# Patient Record
Sex: FEMALE | Race: WHITE | NOT HISPANIC OR LATINO | Employment: OTHER | URBAN - METROPOLITAN AREA
[De-identification: names, ages, dates, MRNs, and addresses within clinical notes are randomized per-mention and may not be internally consistent; named-entity substitution may affect disease eponyms.]

---

## 2017-03-07 ENCOUNTER — OFFICE VISIT (OUTPATIENT)
Dept: INTERNAL MEDICINE | Facility: CLINIC | Age: 67
End: 2017-03-07
Payer: MEDICARE

## 2017-03-07 VITALS
HEIGHT: 72 IN | BODY MASS INDEX: 38.47 KG/M2 | DIASTOLIC BLOOD PRESSURE: 78 MMHG | WEIGHT: 284 LBS | HEART RATE: 67 BPM | SYSTOLIC BLOOD PRESSURE: 118 MMHG

## 2017-03-07 DIAGNOSIS — Z12.11 SCREENING FOR COLON CANCER: ICD-10-CM

## 2017-03-07 DIAGNOSIS — M19.90 OSTEOARTHRITIS, UNSPECIFIED OSTEOARTHRITIS TYPE, UNSPECIFIED SITE: ICD-10-CM

## 2017-03-07 DIAGNOSIS — Z79.1 ENCOUNTER FOR LONG-TERM (CURRENT) USE OF NSAIDS: ICD-10-CM

## 2017-03-07 DIAGNOSIS — M19.90 INFLAMMATORY ARTHRITIS: ICD-10-CM

## 2017-03-07 DIAGNOSIS — L92.1 NECROBIOSIS LIPOIDICA: ICD-10-CM

## 2017-03-07 DIAGNOSIS — I10 ESSENTIAL HYPERTENSION: ICD-10-CM

## 2017-03-07 DIAGNOSIS — Z12.31 ENCOUNTER FOR SCREENING MAMMOGRAM FOR MALIGNANT NEOPLASM OF BREAST: ICD-10-CM

## 2017-03-07 DIAGNOSIS — M13.0 POLYARTHRITIS: Primary | ICD-10-CM

## 2017-03-07 DIAGNOSIS — Z12.39 ENCOUNTER FOR SCREENING FOR MALIGNANT NEOPLASM OF BREAST: ICD-10-CM

## 2017-03-07 DIAGNOSIS — Z11.59 ENCOUNTER FOR HEPATITIS C SCREENING TEST FOR LOW RISK PATIENT: ICD-10-CM

## 2017-03-07 PROCEDURE — 99204 OFFICE O/P NEW MOD 45 MIN: CPT | Mod: S$GLB,,, | Performed by: INTERNAL MEDICINE

## 2017-03-07 PROCEDURE — 99999 PR PBB SHADOW E&M-EST. PATIENT-LVL III: CPT | Mod: PBBFAC,,, | Performed by: INTERNAL MEDICINE

## 2017-03-07 PROCEDURE — 99499 UNLISTED E&M SERVICE: CPT | Mod: S$GLB,,, | Performed by: INTERNAL MEDICINE

## 2017-03-07 PROCEDURE — 3074F SYST BP LT 130 MM HG: CPT | Mod: S$GLB,,, | Performed by: INTERNAL MEDICINE

## 2017-03-07 PROCEDURE — 3078F DIAST BP <80 MM HG: CPT | Mod: S$GLB,,, | Performed by: INTERNAL MEDICINE

## 2017-03-07 PROCEDURE — 1160F RVW MEDS BY RX/DR IN RCRD: CPT | Mod: S$GLB,,, | Performed by: INTERNAL MEDICINE

## 2017-03-07 RX ORDER — LISINOPRIL 20 MG/1
20 TABLET ORAL DAILY
Qty: 90 TABLET | Refills: 11 | Status: SHIPPED | OUTPATIENT
Start: 2017-03-07 | End: 2017-03-28 | Stop reason: SDUPTHER

## 2017-03-07 RX ORDER — FUROSEMIDE 20 MG/1
20 TABLET ORAL DAILY
Qty: 90 TABLET | Refills: 11 | Status: SHIPPED | OUTPATIENT
Start: 2017-03-07 | End: 2017-03-28 | Stop reason: SDUPTHER

## 2017-03-07 RX ORDER — LISINOPRIL 20 MG/1
20 TABLET ORAL DAILY
COMMUNITY
End: 2017-03-07 | Stop reason: SDUPTHER

## 2017-03-07 RX ORDER — FUROSEMIDE 20 MG/1
20 TABLET ORAL 2 TIMES DAILY
COMMUNITY
End: 2017-03-07 | Stop reason: SDUPTHER

## 2017-03-07 RX ORDER — AMLODIPINE BESYLATE 5 MG/1
5 TABLET ORAL DAILY
Qty: 90 TABLET | Refills: 11 | Status: SHIPPED | OUTPATIENT
Start: 2017-03-07 | End: 2017-03-28 | Stop reason: SDUPTHER

## 2017-03-07 RX ORDER — MELOXICAM 7.5 MG/1
7.5 TABLET ORAL DAILY
Qty: 90 TABLET | Refills: 1 | Status: SHIPPED | OUTPATIENT
Start: 2017-03-07 | End: 2017-03-28 | Stop reason: SDUPTHER

## 2017-03-07 NOTE — MR AVS SNAPSHOT
[facility]  [address]  [facility] [address]  Phone: [phone]  Fax: [phone]                  Ana Patel   3/7/2017 9:20 AM   Office Visit    Description:  Female : 1950   Provider:  Gordo Naik MD   Department:  [facility] - Internal Medicine           Reason for Visit     Establish Care           Diagnoses this Visit        Comments    Osteoarthritis, unspecified osteoarthritis type, unspecified site    -  Primary     Essential hypertension         Necrobiosis lipoidica         Polyarthritis         Encounter for hepatitis C screening test for low risk patient         Encounter for screening for malignant neoplasm of breast         Screening for colon cancer         Encounter for screening mammogram for malignant neoplasm of breast         Inflammatory arthritis         Encounter for long-term (current) use of NSAIDs                To Do List           Goals (5 Years of Data)     Quit smoking / using tobacco       Follow-Up and Disposition     Return in about 6 months (around 2017).       These Medications        Disp Refills Start End    amlodipine (NORVASC) 5 MG tablet 90 tablet 11 3/7/2017     Take 1 tablet (5 mg total) by mouth once daily. - Oral    Pharmacy: [facility] [facility] [address] Ph #: [phone]       furosemide (LASIX) 20 MG tablet 90 tablet 11 3/7/2017     Take 1 tablet (20 mg total) by mouth once daily. - Oral    Ph[address]acy: [facility]  [place] [address] Ph #: [phone]       lisinopril (PRINIVIL,ZESTRIL) 20 MG tablet 90 tablet 11 3/7/2017     Take 1 tablet (20 mg total) by mouth once daily. - Oral    Pharmacy: [facility] [facility] [place] [address] Ph #: [phone]       meloxicam (MOBIC) 7.5 MG tablet 90 tablet 1 3/7/2017     Take 1 tablet (7.5 mg total) by mouth once daily. - Oral    Pharmacy: [facility] [address] [place] - [address] [place] [address] #: [phone]         [facility]     [facility] - 24/7 Assistance  Registered nurses in the [facility] provide clinical advisement, health education, appointment booking, and other advisory services.  Call for this free service at [phone].             Medications           Message regarding Medications     Verify the changes and/or additions to your medication regime listed below are the same as discussed with your clinician today.  If any of these changes or additions are incorrect, please notify your healthcare provider.        START taking these NEW medications        Refills    furosemide (LASIX) 20 MG tablet 11    Sig: Take 1 tablet (20 mg total) by mouth once daily.    Class: Normal    Route: Oral    lisinopril (PRINIVIL,ZESTRIL) 20 MG tablet 11    Sig: Take 1 tablet (20 mg total) by mouth once daily.    Class: Normal    Route: Oral      CHANGE how you are taking these medications     Start Taking Instead of    amlodipine (NORVASC) 5 MG tablet amlodipine (NORVASC) 5 MG tablet    Dosage:  Take 1 tablet (5 mg total) by mouth once daily. Dosage:  Take 5 mg by mouth once daily.    Reason for Change:  Reorder     meloxicam (MOBIC) 7.5 MG tablet meloxicam (MOBIC) 7.5 MG tablet    Dosage:  Take 1 tablet (7.5 mg total) by mouth once daily. Dosage:  Take 7.5 mg by mouth once daily.    Reason for Change:  Reorder       STOP taking these medications     methylPREDNISolone (MEDROL DOSEPACK) 4 mg tablet use as directed    lisinopril-hydrochlorothiazide (PRINZIDE,ZESTORETIC) 20-25 mg Tab Take 1 tablet by mouth once daily.    predniSONE (DELTASONE) 20 MG tablet Take one daily    gentamicin (GARAMYCIN) 0.1 % cream Apply topically 3 (three) times daily.    fexofenadine (ALLEGRA) 180 MG tablet Take 180 mg by mouth once daily.    doxycycline (VIBRAMYCIN) 100 MG Cap Take 1 capsule (100 mg total) by mouth every 12 (twelve)  hours.    diphenhydrAMINE (SOMINEX) 25 mg tablet Take 50 mg by mouth nightly as needed.    betamethasone dipropionate (DIPROLENE) 0.05 % cream Apply topically 2 (two) times daily.           Verify that the below list of medications is an accurate representation of the medications you are currently taking.  If none reported, the list may be blank. If incorrect, please contact your healthcare provider. Carry this list with you in case of emergency.           Current Medications     amlodipine (NORVASC) 5 MG tablet Take 1 tablet (5 mg total) by mouth once daily.    furosemide (LASIX) 20 MG tablet Take 1 tablet (20 mg total) by mouth once daily.    lisinopril (PRINIVIL,ZESTRIL) 20 MG tablet Take 1 tablet (20 mg total) by mouth once daily.    meloxicam (MOBIC) 7.5 MG tablet Take 1 tablet (7.5 mg total) by mouth once daily.           Clinical Reference Information           Your Vitals Were     BP Pulse Height Weight BMI    118/78 67 6' (1.829 m) 128.8 kg (284 lb) 38.52 kg/m2      Blood Pressure          Most Recent Value    BP  118/78      Allergies as of 3/7/2017     Sulfa (Sulfonamide Antibiotics)    Ciprofloxacin    Pcn [Penicillins]      Immunizations Administered on Date of Encounter - 3/7/2017     None      Orders Placed During Today's Visit      Normal Orders This Visit    Ambulatory Referral to Rheumatology     Future Labs/Procedures Expected by Expires    C-reactive protein  3/7/2017 5/6/2018    CBC auto differential  3/7/2017 6/5/2017    Comprehensive metabolic panel  3/7/2017 6/5/2017    Cyclic citrul peptide antibody, IgG  3/7/2017 5/6/2018    Hepatitis C antibody  3/7/2017 5/6/2018    Mammo Digital Screening Bilat with CAD  3/7/2017 (Approximate) 3/7/2018    Rheumatoid factor  3/7/2017 6/5/2017    Sedimentation rate, manual  3/7/2017 6/5/2017    TSH  3/7/2017 5/6/2018    Lipid panel  9/3/2017 (Approximate) 11/2/2017      Smoking Cessation     If you would like to quit smoking:   You may be eligible for free  services if you are a [place] resident and started smoking cigarettes before September 1, 1988.  Call the Smoking Cessation Trust (SCT) toll free at (035) 963-6243 or (521) 967-6435.   Call 1-800-QUIT-NOW if you do not meet the above criteria.            Language Assistance Services     ATTENTION: Language assistance services are available, free of charge. Please call [phone].      ATENCIÓN: Si habla español, tiene a lance disposición servicios gratuitos de asistencia lingüística. Llame al [phone].     CHÚ Ý: N?u b?n nói Ti?ng Vi?t, có các d?ch v? h? tr? ngôn ng? mi?n phí dành cho b?n. G?i s? [phone].         [facility] complies with applicable Federal civil rights laws and does not discriminate on the basis of race, color, national origin, age, disability, or sex.

## 2017-03-07 NOTE — PROGRESS NOTES
Subjective:       Patient ID: Ana Patel is a 67 y.o. female.    Chief Complaint: Establish Care    HPI Comments: Here to est care.    Chronic arthirtis pains, mobic 7.5mg losing efficacy. occ aleve as well.  She claims hx of arthritis at 5yrs, may have had rheumatic fever.  Stiffness first in am, 1-2 hours. Multiple jts are involved, especially hands, feet.    Review of Systems   Constitutional: Negative for activity change, appetite change and unexpected weight change.   Eyes: Negative for visual disturbance.   Respiratory: Negative for cough, chest tightness and shortness of breath.    Cardiovascular: Negative for chest pain.   Gastrointestinal: Negative for abdominal distention and abdominal pain.   Genitourinary: Negative for difficulty urinating and urgency.        No breast lumps/masses/pain/nipple d/c in either breast     Musculoskeletal: Positive for arthralgias and joint swelling.   Skin: Negative for rash.   Neurological: Negative for tremors, syncope and weakness.       Objective:      Physical Exam   Constitutional: She is oriented to person, place, and time. She appears well-developed and well-nourished. No distress.   HENT:   Head: Normocephalic and atraumatic.   Eyes: Pupils are equal, round, and reactive to light. No scleral icterus.   Neck: Normal range of motion. No thyromegaly present.   Cardiovascular: Normal rate, regular rhythm and normal heart sounds.  Exam reveals no gallop and no friction rub.    No murmur heard.  Pulmonary/Chest: Effort normal and breath sounds normal. No respiratory distress. She has no wheezes. She has no rales.   Abdominal: Soft. Bowel sounds are normal. She exhibits no distension and no mass. There is no tenderness. There is no rebound and no guarding.   Musculoskeletal: Normal range of motion. She exhibits edema.   Flat feet bilat with lateral deviation of the toes.    No nail pitting.    Mild leg swelling bilat   Lymphadenopathy:     She has no cervical  adenopathy.   Neurological: She is alert and oriented to person, place, and time.   Skin: She is not diaphoretic.   Psychiatric: She has a normal mood and affect. Her speech is normal and behavior is normal. Cognition and memory are normal.       Assessment:       1. Osteoarthritis, unspecified osteoarthritis type, unspecified site    2. Essential hypertension    3. Necrobiosis lipoidica    4. Polyarthritis    5. Encounter for hepatitis C screening test for low risk patient    6. Encounter for screening for malignant neoplasm of breast    7. Screening for colon cancer    8. Encounter for screening mammogram for malignant neoplasm of breast    9. Inflammatory arthritis    10. Encounter for long-term (current) use of NSAIDs        Plan:       Ana was seen today for establish care.    Diagnoses and all orders for this visit:    Osteoarthritis, unspecified osteoarthritis type, unspecified site  -     meloxicam (MOBIC) 7.5 MG tablet; Take 1 tablet (7.5 mg total) by mouth once daily.  -     CBC auto differential; Future  -     TSH; Future    Essential hypertension  -     amlodipine (NORVASC) 5 MG tablet; Take 1 tablet (5 mg total) by mouth once daily.  -     furosemide (LASIX) 20 MG tablet; Take 1 tablet (20 mg total) by mouth once daily.  -     lisinopril (PRINIVIL,ZESTRIL) 20 MG tablet; Take 1 tablet (20 mg total) by mouth once daily.  -     CBC auto differential; Future  -     Lipid panel; Future  -     TSH; Future  controlled    Necrobiosis lipoidica  -     CBC auto differential; Future  -     TSH; Future    Polyarthritis  -     meloxicam (MOBIC) 7.5 MG tablet; Take 1 tablet (7.5 mg total) by mouth once daily.  -     CBC auto differential; Future  -     Comprehensive metabolic panel; Future  -     TSH; Future  -     Rheumatoid factor; Future  -     Cyclic citrul peptide antibody, IgG; Future  -     Sedimentation rate, manual; Future  -     C-reactive protein; Future  -     Ambulatory Referral to Rheumatology  She  stated she has a hx of psoriasis, not active now though. jtys pains could be PsArthritis.    Encounter for hepatitis C screening test for low risk patient  -     Hepatitis C antibody; Future  -     TSH; Future    Encounter for screening for malignant neoplasm of breast  -     TSH; Future  -     Cancel: Mammo Digital Screening Bilat with CAD; Future    Screening for colon cancer  -     TSH; Future    Encounter for screening mammogram for malignant neoplasm of breast  -     TSH; Future  -     Cancel: Mammo Digital Screening Bilat with CAD; Future    Inflammatory arthritis  -     CBC auto differential; Future  -     TSH; Future    Encounter for long-term (current) use of NSAIDs  -     CBC auto differential; Future  -     TSH; Future      [address]65: Smoking and tobacco cessation counseling visit; intensive, greater than 10 minutes  Not ready to quit    Health Maintenance       Date Due Completion Date    TETANUS VACCINE 1/31/1970 ---    DEXA SCAN 1/31/1992 ---    Colonoscopy 1/31/2002 ---    Pneumococcal (65+) (1 of 2 - PCV13) 1/31/2017 ---    Mammogram 3/8/2019 3/8/2017    Lipid Panel 3/8/2022 3/8/2017      Declines colo  Declines vaccines    Return in about 6 months (around 9/7/2017).

## 2017-03-08 ENCOUNTER — HOSPITAL ENCOUNTER (OUTPATIENT)
Dept: RADIOLOGY | Facility: HOSPITAL | Age: 67
Discharge: HOME | End: 2017-03-08
Attending: INTERNAL MEDICINE
Payer: MEDICARE

## 2017-03-08 DIAGNOSIS — Z12.39 ENCOUNTER FOR SCREENING FOR MALIGNANT NEOPLASM OF BREAST: ICD-10-CM

## 2017-03-08 DIAGNOSIS — Z12.31 ENCOUNTER FOR SCREENING MAMMOGRAM FOR MALIGNANT NEOPLASM OF BREAST: ICD-10-CM

## 2017-03-08 PROCEDURE — 77063 BREAST TOMOSYNTHESIS BI: CPT | Mod: 26,,, | Performed by: RADIOLOGY

## 2017-03-08 PROCEDURE — 77067 SCR MAMMO BI INCL CAD: CPT | Mod: 26,,, | Performed by: RADIOLOGY

## 2017-03-08 PROCEDURE — 77067 SCR MAMMO BI INCL CAD: CPT | Mod: TC

## 2017-03-13 NOTE — PROGRESS NOTES
Patient, Ana Patel (MRN #[id number]), presented with a recorded BMI of 38.52 kg/m^2 and a documented comorbidity(s):  - Hypertension  to which the severe obesity is a contributing factor. This is consistent with the definition of severe obesity (BMI 35.0-35.9) with comorbidity (ICD-10 E66.01, Z68.35). The patient's severe obesity was monitored, evaluated, addressed and/or treated. This addendum to the medical record is made on 03/13/2017.

## 2017-03-28 DIAGNOSIS — I10 ESSENTIAL HYPERTENSION: ICD-10-CM

## 2017-03-28 DIAGNOSIS — M13.0 POLYARTHRITIS: ICD-10-CM

## 2017-03-28 DIAGNOSIS — M19.90 OSTEOARTHRITIS, UNSPECIFIED OSTEOARTHRITIS TYPE, UNSPECIFIED SITE: ICD-10-CM

## 2017-03-28 NOTE — TELEPHONE ENCOUNTER
----- Message from Anabella Carmichael sent at 3/28/2017 12:42 PM CDT -----  Contact: /[place]/[phone]  Pt's  said that he is calling in regards to [employer] needs the doctor to send pt's rx's and needs the dosage and quantity of pt's rx's for meloxicam (MOBIC) 7.5 MG tablet, lisinopril (PRINIVIL,ZESTRIL) 20 MG tablet, furosemide (LASIX) 20 MG tablet and amlodipine (NORVASC) 5 MG tablet pt is asking for a 90 day supply of each medication she is new to [employer] [phone] pt's  would like a call when the rx's have been sent. Please call and advise          Thank you

## 2017-03-29 RX ORDER — LISINOPRIL 20 MG/1
20 TABLET ORAL DAILY
Qty: 90 TABLET | Refills: 11 | Status: SHIPPED | OUTPATIENT
Start: 2017-03-29 | End: 2018-04-25 | Stop reason: SDUPTHER

## 2017-03-29 RX ORDER — FUROSEMIDE 20 MG/1
20 TABLET ORAL DAILY
Qty: 90 TABLET | Refills: 11 | Status: SHIPPED | OUTPATIENT
Start: 2017-03-29 | End: 2018-04-25 | Stop reason: SDUPTHER

## 2017-03-29 RX ORDER — MELOXICAM 7.5 MG/1
7.5 TABLET ORAL DAILY
Qty: 90 TABLET | Refills: 1 | Status: SHIPPED | OUTPATIENT
Start: 2017-03-29 | End: 2017-09-20 | Stop reason: SDUPTHER

## 2017-03-29 RX ORDER — AMLODIPINE BESYLATE 5 MG/1
5 TABLET ORAL DAILY
Qty: 90 TABLET | Refills: 11 | Status: SHIPPED | OUTPATIENT
Start: 2017-03-29 | End: 2018-04-25 | Stop reason: SDUPTHER

## 2017-08-24 ENCOUNTER — OFFICE VISIT (OUTPATIENT)
Dept: INTERNAL MEDICINE | Facility: CLINIC | Age: 67
End: 2017-08-24
Payer: MEDICARE

## 2017-08-24 VITALS
DIASTOLIC BLOOD PRESSURE: 72 MMHG | BODY MASS INDEX: 39.68 KG/M2 | HEART RATE: 68 BPM | SYSTOLIC BLOOD PRESSURE: 118 MMHG | WEIGHT: 293 LBS | HEIGHT: 72 IN

## 2017-08-24 DIAGNOSIS — M13.0 POLYARTHRITIS: ICD-10-CM

## 2017-08-24 DIAGNOSIS — L92.1 NECROBIOSIS LIPOIDICA: ICD-10-CM

## 2017-08-24 DIAGNOSIS — Z00.00 ENCOUNTER FOR PREVENTIVE HEALTH EXAMINATION: Primary | ICD-10-CM

## 2017-08-24 DIAGNOSIS — Z23 IMMUNIZATION DUE: ICD-10-CM

## 2017-08-24 DIAGNOSIS — Z13.5 GLAUCOMA SCREENING: ICD-10-CM

## 2017-08-24 DIAGNOSIS — F17.200 TOBACCO DEPENDENCE: ICD-10-CM

## 2017-08-24 DIAGNOSIS — E66.01 MORBID OBESITY DUE TO EXCESS CALORIES: ICD-10-CM

## 2017-08-24 DIAGNOSIS — I10 ESSENTIAL HYPERTENSION: ICD-10-CM

## 2017-08-24 PROCEDURE — 99499 UNLISTED E&M SERVICE: CPT | Mod: S$GLB,,, | Performed by: NURSE PRACTITIONER

## 2017-08-24 PROCEDURE — G0439 PPPS, SUBSEQ VISIT: HCPCS | Mod: S$GLB,,, | Performed by: NURSE PRACTITIONER

## 2017-08-24 PROCEDURE — G0009 ADMIN PNEUMOCOCCAL VACCINE: HCPCS | Mod: S$GLB,,, | Performed by: NURSE PRACTITIONER

## 2017-08-24 PROCEDURE — 90670 PCV13 VACCINE IM: CPT | Mod: S$GLB,,, | Performed by: NURSE PRACTITIONER

## 2017-08-24 PROCEDURE — 99999 PR PBB SHADOW E&M-EST. PATIENT-LVL V: CPT | Mod: PBBFAC,,, | Performed by: NURSE PRACTITIONER

## 2017-08-24 RX ORDER — MINERAL OIL
180 ENEMA (ML) RECTAL DAILY PRN
COMMUNITY
End: 2022-06-01 | Stop reason: SDUPTHER

## 2017-08-24 NOTE — PROGRESS NOTES
Ana Patel presented for a  Medicare AWV and comprehensive Health Risk Assessment today. The following components were reviewed and updated:    · Medical history  · Family History  · Social history  · Allergies and Current Medications  · Health Risk Assessment  · Health Maintenance  · Care Team     ** See Completed Assessments for Annual Wellness Visit within the encounter summary.**       The following assessments were completed:  · Living Situation  · CAGE  · Depression Screening  · Timed Get Up and Go  · Whisper Test  · Cognitive Function Screening  ·   ·   ·   · Nutrition Screening  · ADL Screening  · PAQ Screening    Vitals:    08/24/17 0855   BP: 118/72   BP Location: Left arm   Patient Position: Sitting   BP Method: Large (Manual)   Pulse: 68   Weight: 134.3 kg (296 lb 1.2 oz)   Height: 6' (1.829 m)     Body mass index is 40.16 kg/m².  Physical Exam   Constitutional: She is oriented to person, place, and time.   Obese   HENT:   Head: Normocephalic.   Cardiovascular: Normal rate and regular rhythm.    Pulmonary/Chest: Effort normal and breath sounds normal.   Abdominal: Soft. Bowel sounds are normal.   Obese   Musculoskeletal: Normal range of motion. She exhibits no edema.   Neurological: She is alert and oriented to person, place, and time.   Skin: Skin is warm and dry.   Darkened areas anterior lower legs   Psychiatric: She has a normal mood and affect.   Nursing note and vitals reviewed.        Diagnoses and health risks identified today and associated recommendations/orders:    1. Encounter for preventive health examination  Here for Health Risk Assessment. Follow up in one year.  Declined DEXA and Colon Cancer Screening - wants to discuss with PCP at next appointment.  Declined TDAP    2. Immunization due  Pneumococcal polysaccharide administeded    3. Glaucoma screening  - Ambulatory Referral to Optometry    4. Tobacco dependence  Chronic, continues to smoke 1/2 PPD. Counseled to stop smoking.  -  Ambulatory referral to Smoking Cessation Program    5. Essential hypertension  Chronic, stable on current medications. Followed by PCP    6. Polyarthritis  Chronic, reports chronic pain. Followed by PCP.    7. Morbid obesity due to excess calories  Chronic, weight up 12 pounds since 3/2017. Discussed options including Health , Medical Fitness, Medical Bariatrics. Interested in Medical Bariatrics, but wished to discuss with PCP. PCP notified. Reinforced importance of diet/exercise. Followed by PCP.    8. Necrobiosis lipoidica  Chronic, stable. Skin intact. Followed by PCP.      Provided Ana with a 5-10 year written screening schedule and personal prevention plan. Recommendations were developed using the USPSTF age appropriate recommendations. Education, counseling, and referrals were provided as needed. After Visit Summary printed and given to patient which includes a list of additional screenings\tests needed.    Return in 5 weeks (on 9/28/2017).with PCP    Caridad Diaz NP

## 2017-08-24 NOTE — Clinical Note
Here for HRA. Her weight is up 12 pounds since 3/2017. Discussed options for weight loss. She is interest in Medical Bariatrics ( was with her and he is interested also). She would like to discuss this with you at her appointment on 9/28. She also declined DEXA and Colon Cancer screening (Fit) and wants to discuss with you. Thanks

## 2017-08-24 NOTE — PATIENT INSTRUCTIONS
Counseling and Referral of Other Preventative  (Italic type indicates deductible and co-insurance are waived)    Patient Name: Ana Patel  Today's Date: 8/24/2017      SERVICE LIMITATIONS RECOMMENDATION    Vaccines    · Pneumococcal (once after 65)    · Influenza (annually)    · Hepatitis B (if medium/high risk)    · Prevnar 13      Hepatitis B medium/high risk factors:       - End-stage renal disease       - Hemophiliacs who received Factor VII or         IX concentrates       - Clients of institutions for the mentally             retarded       - Persons who live in the same house as          a HepB carrier       - Homosexual men       - Illicit injectable drug abusers     Pneumococcal: Due after 8/24/2107     Influenza: Due fall 2017     Hepatitis B: N/A     Prevnar 13: Done, no repeat necessary    Mammogram (biennial age 50-74)  Annually (age 40 or over)  Done this year, repeat every year    Pap (up to age 70 and after 70 if unknown history or abnormal study last 10 years)    Recommended to patient, declined     The USPSTF recommends against screening for cervical cancer in women older than age 65 years who have had adequate prior screening and are not otherwise at high risk for cervical cancer.      Colorectal cancer screening (to age 75)    · Fecal occult blood test (annual)  · Flexible sigmoidoscopy (5y)  · Screening colonoscopy (10y)  · Barium enema   Recommended to patient, declined    Diabetes self-management training (no USPSTF recommendations)  Requires referral by treating physician for patient with diabetes or renal disease. 10 hours of initial DSMT sessions of no less than 30 minutes each in a continuous 12-month period. 2 hours of follow-up DSMT in subsequent years.  N/A    Bone mass measurements (age 65 & older, biennial)  Requires diagnosis related to osteoporosis or estrogen deficiency. Biennial benefit unless patient has history of long-term glucocorticoid  Recommended to patient, declined     Glaucoma screening (no USPSTF recommendation)  Diabetes mellitus, family history   , age 50 or over    American, age 65 or over  Scheduled, see appointments    Medical nutrition therapy for diabetes or renal disease (no recommended schedule)  Requires referral by treating physician for patient with diabetes or renal disease or kidney transplant within the past 3 years.  Can be provided in same year as diabetes self-management training (DSMT), and CMS recommends medical nutrition therapy take place after DSMT. Up to 3 hours for initial year and 2 hours in subsequent years.  N/A    Cardiovascular screening blood tests (every 5 years)  · Fasting lipid panel  Order as a panel if possible  Done this year, repeat every year    Diabetes screening tests (at least every 3 years, Medicare covers annually or at 6-month intervals for prediabetic patients)  · Fasting blood sugar (FBS) or glucose tolerance test (GTT)  Patient must be diagnosed with one of the following:       - Hypertension       - Dyslipidemia       - Obesity (BMI 30kg/m2)       - Previous elevated impaired FBS or GTT       ... or any two of the following:       - Overweight (BMI 25 but <30)       - Family history of diabetes       - Age 65 or older       - History of gestational diabetes or birth of baby weighing more than 9 pounds  Done this year, repeat every year    HIV screening (annually for increased risk patients)  · HIV-1 and HIV-2 by EIA, or JESE, rapid antibody test or oral mucosa transudate  Patients must be at increased risk for HIV infection per USPSTF guidelines or pregnant. Tests covered annually for patient at increased risk or as requested by the patient. Pregnant patients may receive up to 3 tests during pregnancy.  Risks discussed, screening is not recommended    Smoking cessation counseling (up to 8 sessions per year)  Patients must be asymptomatic of tobacco-related conditions to receive as a preventative  service.  Referred to Tobacco Cessation Program.    Subsequent annual wellness visit  At least 12 months since last AWV  Return in one year     The following information is provided to all patients.  This information is to help you find resources for any of the problems found today that may be affecting your health:                Living healthy guide: www.[facility].[place].[facility]      Understanding Diabetes: www.diabetes.org      Eating healthy: www.cdc.gov/healthyweight      CDC home safety checklist: www.cdc.gov/steadi/patient.html      Agency on Aging: www.goea.[place].[facility]      Alcoholics anonymous (AA): www.aa.org      Physical Activity: www.armand.nih.gov/ed5kdgh      Tobacco use: www.quitwithusla.org

## 2017-09-20 DIAGNOSIS — M13.0 POLYARTHRITIS: ICD-10-CM

## 2017-09-20 DIAGNOSIS — M19.90 OSTEOARTHRITIS, UNSPECIFIED OSTEOARTHRITIS TYPE, UNSPECIFIED SITE: ICD-10-CM

## 2017-09-22 RX ORDER — MELOXICAM 7.5 MG/1
TABLET ORAL
Qty: 90 TABLET | Refills: 1 | Status: SHIPPED | OUTPATIENT
Start: 2017-09-22 | End: 2018-02-09 | Stop reason: SDUPTHER

## 2017-09-28 ENCOUNTER — OFFICE VISIT (OUTPATIENT)
Dept: INTERNAL MEDICINE | Facility: CLINIC | Age: 67
End: 2017-09-28
Payer: MEDICARE

## 2017-09-28 ENCOUNTER — INITIAL CONSULT (OUTPATIENT)
Dept: OPTOMETRY | Facility: CLINIC | Age: 67
End: 2017-09-28
Payer: MEDICARE

## 2017-09-28 ENCOUNTER — DOCUMENTATION ONLY (OUTPATIENT)
Dept: INTERNAL MEDICINE | Facility: CLINIC | Age: 67
End: 2017-09-28

## 2017-09-28 VITALS
SYSTOLIC BLOOD PRESSURE: 118 MMHG | HEART RATE: 63 BPM | HEIGHT: 72 IN | WEIGHT: 293 LBS | OXYGEN SATURATION: 95 % | DIASTOLIC BLOOD PRESSURE: 70 MMHG | BODY MASS INDEX: 39.68 KG/M2 | TEMPERATURE: 99 F

## 2017-09-28 DIAGNOSIS — E66.01 MORBID OBESITY DUE TO EXCESS CALORIES: ICD-10-CM

## 2017-09-28 DIAGNOSIS — Z78.0 MENOPAUSE: ICD-10-CM

## 2017-09-28 DIAGNOSIS — H25.13 NUCLEAR SCLEROTIC CATARACT OF BOTH EYES: Primary | ICD-10-CM

## 2017-09-28 DIAGNOSIS — M13.0 POLYARTHRITIS: Primary | ICD-10-CM

## 2017-09-28 DIAGNOSIS — H52.4 HYPEROPIA WITH PRESBYOPIA OF BOTH EYES: ICD-10-CM

## 2017-09-28 DIAGNOSIS — Z12.11 COLON CANCER SCREENING: ICD-10-CM

## 2017-09-28 DIAGNOSIS — F17.200 SMOKER: ICD-10-CM

## 2017-09-28 DIAGNOSIS — I10 ESSENTIAL HYPERTENSION: ICD-10-CM

## 2017-09-28 DIAGNOSIS — D31.32 CHOROIDAL NEVUS, LEFT EYE: ICD-10-CM

## 2017-09-28 DIAGNOSIS — M79.661 PAIN OF RIGHT LOWER LEG: ICD-10-CM

## 2017-09-28 DIAGNOSIS — H52.03 HYPEROPIA WITH PRESBYOPIA OF BOTH EYES: ICD-10-CM

## 2017-09-28 PROCEDURE — 92004 COMPRE OPH EXAM NEW PT 1/>: CPT | Mod: S$GLB,,, | Performed by: OPTOMETRIST

## 2017-09-28 PROCEDURE — 99499 UNLISTED E&M SERVICE: CPT | Mod: S$GLB,,, | Performed by: INTERNAL MEDICINE

## 2017-09-28 PROCEDURE — 92250 FUNDUS PHOTOGRAPHY W/I&R: CPT | Mod: S$GLB,,, | Performed by: OPTOMETRIST

## 2017-09-28 PROCEDURE — 99214 OFFICE O/P EST MOD 30 MIN: CPT | Mod: S$GLB,,, | Performed by: INTERNAL MEDICINE

## 2017-09-28 PROCEDURE — 99999 PR PBB SHADOW E&M-EST. PATIENT-LVL V: CPT | Mod: PBBFAC,,, | Performed by: INTERNAL MEDICINE

## 2017-09-28 PROCEDURE — 99999 PR PBB SHADOW E&M-EST. PATIENT-LVL II: CPT | Mod: PBBFAC,,, | Performed by: OPTOMETRIST

## 2017-09-28 NOTE — MEDICAL/APP STUDENT
Subjective:       Patient ID: Ana Patel is a 67 y.o. female.  PMH: Psoriasis, hyperlipidemia, HTN, OA    Chief Complaint: Follow-up    HPI   Mrs. Patel presents for follow 6 month routine follow up.  Her OA has been stable since the last time she was in clinic.  She has been taking Meloxicam 7.5mg daily for pain which she says has helped and added on the occasional naproxen as needed for pain control.  Generally she is stiff in the morning and gets better throughout the day.  The pain is located in her ankles, knees, hips and occasionally her shoulders. She is interested in Weight loss help and wants to go to see Dr. Nunes for advice about this. She does not exercise at home and has to walk with a cane for stability.      She saw optometry this morning and they said that she had bilateral cataracts but with no impairment of her vision at this time.  Plans to follow up in one year with them.    Concerned about how up to date she is with vaccines.  Only vaccine she needs currently is a TdAP.     Wants to quit smoking and has cut back to half a pack a day currently.  Interested in smoking cessation classes.    Past Medical History:   Diagnosis Date    Eczema     Hyperlipidemia     Hypertension     Necrobiosis lipoidica     Obesity     Osteoarthritis     Varicose vein of leg      Past Surgical History:   Procedure Laterality Date    TONSILLECTOMY, ADENOIDECTOMY       Family History   Problem Relation Age of Onset    Adopted: Yes    No Known Problems Daughter     No Known Problems Son        Review of Systems   Constitutional: Positive for activity change. Negative for appetite change, chills, diaphoresis, fatigue, fever and unexpected weight change.   HENT: Negative for congestion and dental problem.    Eyes: Negative for discharge and itching.   Respiratory: Negative for apnea, chest tightness and shortness of breath.    Cardiovascular: Negative for chest pain and leg swelling.    Gastrointestinal: Negative for abdominal distention and abdominal pain.   Endocrine: Negative for cold intolerance and heat intolerance.   Genitourinary: Negative for difficulty urinating, dyspareunia and dysuria.   Musculoskeletal: Positive for arthralgias and gait problem. Negative for back pain.   Skin: Positive for wound. Negative for color change and pallor.   Allergic/Immunologic: Negative for environmental allergies.   Neurological: Negative for dizziness and facial asymmetry.   Hematological: Negative for adenopathy. Does not bruise/bleed easily.   Psychiatric/Behavioral: Negative for agitation and behavioral problems.       Objective:       Vitals:    09/28/17 1043   BP: 118/70   Pulse: 63   Temp: 98.7 °F (37.1 °C)     Physical Exam    Gen: Obese woman who needs to walk with a cane. Looks stated age.  HEENT: Pupils dilated at 5mm; No lymphadenopathy  CVs: pulses 2+; Normal S1 and S2 with no murmurs rubs or gallops  Resp: Lungs clear with normal breath sounds  Abdo: Soft non tender non distended with bowel sounds present.  Extremities: visible wound on bilateral shins.  MSK: Crepitus in R shoulder with Normal ROM; Crepitus in bilateral knees with normal ROM.  Gait problem that requires a cane.    Assessment:     Mrs. Patel is a 66yo F here for routine followup who is stable with her OA since the last time she was seen by us.    1. Smoker    2. Pain of right lower leg    3. Essential hypertension    4. Polyarthritis    5. Morbid obesity due to excess calories    6. Colon cancer screening    7. Menopause        Plan:   Arthritis/OA: Stable since last time she was seen with maloxicam being given.  Plan for X-rays today. Encouraged to exercise at [facility] for WL and mobility.   encoureing her.  Scheduled a follow up with a rheumatologist for her psoriasis and arthritis.    Smoking cessation: plans to quit but on her own accord.  Plans to attend [facility] smoking cessation classes.       Colonoscopy: Does not want but will do a FOBT.    DEXA scan: Needs new scan.    Flu vaccine: done at outside facility this month.    RTC: 6 months for follow up.

## 2017-09-28 NOTE — LETTER
September 28, 2017      Caridad Diaz, NP  [address]  [facility] [address]           [facility]  [address]  [facility] [address]  Phone: [phone]          Patient: Ana Patel   MR Number: [id number]   YOB: 1950   Date of Visit: 9/28/2017       Dear Caridad Diaz:    Thank you for referring Ana Patel to me for evaluation. Attached you will find relevant portions of my assessment and plan of care.    If you have questions, please do not hesitate to call me. I look forward to following Ana Patel along with you.    Sincerely,    Tasia Martinez, OD    Enclosure  CC:  No Recipients    If you would like to receive this communication electronically, please contact [email or web address] or [phone] to request more information on [facility] Link access.    For providers and/or their staff who would like to refer a patient to [facility], please contact us through our one-stop-shop provider referral line, [facility], at [phone].    If you feel you have received this communication in error or would no longer like to receive these types of communications, please e-mail [email or web address]

## 2017-09-28 NOTE — PROGRESS NOTES
Subjective:       Patient ID: Ana Patel is a 67 y.o. female.    Chief Complaint: Follow-up    mobic helps her arthritis pain, still occ aleve.  Pains in knees, hips, shoulders.    Int in seeing wt loss doctor.    Taking pressure meds.    Last BPPV attack a few mos ago.    Still smoking, now 1/2 ppd.      Review of Systems   Constitutional: Negative for activity change, appetite change and unexpected weight change.   Eyes: Negative for visual disturbance.   Respiratory: Negative for cough, chest tightness and shortness of breath.    Cardiovascular: Negative for chest pain.   Gastrointestinal: Negative for abdominal distention and abdominal pain.   Genitourinary: Negative for difficulty urinating and urgency.        No breast lumps/masses/pain/nipple d/c in either breast     Musculoskeletal: Positive for arthralgias and joint swelling.        Endorses morning stiffness   Skin: Positive for rash (psoriasis).   Neurological: Negative for tremors, syncope and weakness.       Objective:      Physical Exam   Constitutional: She is oriented to person, place, and time. She appears well-developed and well-nourished. No distress.   HENT:   Head: Normocephalic and atraumatic.   Eyes: Pupils are equal, round, and reactive to light. No scleral icterus.   Neck: Normal range of motion. No thyromegaly present.   Cardiovascular: Normal rate, regular rhythm and normal heart sounds.  Exam reveals no gallop and no friction rub.    No murmur heard.  Pulmonary/Chest: Effort normal and breath sounds normal. No respiratory distress. She has no wheezes. She has no rales.   Abdominal: Soft. Bowel sounds are normal. She exhibits no distension and no mass. There is no tenderness. There is no rebound and no guarding.   Musculoskeletal: Normal range of motion. She exhibits edema.   Flat feet bilat with lateral deviation of the toes.    No nail pitting.    Mild leg swelling bilat   Lymphadenopathy:     She has no cervical adenopathy.    Neurological: She is alert and oriented to person, place, and time.   Skin: She is not diaphoretic.   Plaques on posterior elbows, scattered other plaques   Psychiatric: She has a normal mood and affect. Her speech is normal and behavior is normal. Cognition and memory are normal.       Assessment:       1. Smoker    2. Pain of right lower leg    3. Essential hypertension    4. Polyarthritis    5. Morbid obesity due to excess calories    6. Colon cancer screening    7. Menopause        Plan:       Ana was seen today for follow-up.    Diagnoses and all orders for this visit:    Smoker  -     Ambulatory referral to Smoking Cessation Program    Pain of right lower leg    Essential hypertension  At goal    Polyarthritis  -     Ambulatory Referral to Rheumatology  -     X-ray Arthritis Survey; Future  Maybe psoriatic arthritis.    Morbid obesity due to excess calories  -     Ambulatory Referral to Medical Fitness ([employer]ro[employer])  -     [facility] ASSIGN QUESTIONNAIRE SERIES ([facility])  -     [facility] Patient Entered [facility] ([facility])  -     Ambulatory Referral to Bariatric Medicine    Colon cancer screening  -     Fecal Immunochemical Test (iFOBT); Future  Declines colo    Menopause  -     DXA Bone Density Spine And Hip; Future        Health Maintenance       Date Due Completion Date    DEXA SCAN 01/31/1992 ---    Colonoscopy 01/31/2002 ---    Pneumococcal (65+) (2 of 2 - PPSV23) 08/24/2018 8/24/2017    Mammogram 03/09/2019 3/9/2017    Lipid Panel 03/08/2022 3/8/2017    TETANUS VACCINE 08/24/2027 8/24/2017 (Declined)    Override on 8/24/2017: Declined          Return in about 6 months (around 3/28/2018).

## 2017-09-28 NOTE — PROGRESS NOTES
HPI     Ms. Ana Patel was referred by Caridad Diaz N.P. for glaucoma   screening     Patient states she is here today for a routine eye exam. She reports clear   distance vision with +1.50 OTC readers, and clear near vision with +2.75   OTC readers. Patient feels that because of her vertigo she will be unable   to adjust to wearing a bifocal lens. She declines refraction today.    (-)drops  (-)flashes  (-)floaters  (-)diplopia    Diabetic no  No results found for: HGBA1C    OCULAR HISTORY  Last Eye Exam 20 years ago   (-)eye surgery   (-)diagnosed or treated for any eye conditions or diseases none     FAMILY HISTORY  Glaucoma: unknown, pt adopted       Last edited by Tasia Martinez, OD on 9/28/2017  9:50 AM. (History)            Assessment /Plan     For exam results, see Encounter Report.    Nuclear sclerotic cataract of both eyes   Not visually significant OU. Monitor.      Choroidal nevus, left eye   Appears benign. Patient educated on findings and importance of yearly monitoring.  -     Color Fundus Photography - OU - Both Eyes    Hyperopia with presbyopia of both eyes   Continue OTC readers at distance and near.       RTC 1 year

## 2017-10-03 ENCOUNTER — HOSPITAL ENCOUNTER (OUTPATIENT)
Dept: RADIOLOGY | Facility: HOSPITAL | Age: 67
Discharge: HOME | End: 2017-10-03
Attending: INTERNAL MEDICINE
Payer: MEDICARE

## 2017-10-03 DIAGNOSIS — M13.0 POLYARTHRITIS: ICD-10-CM

## 2017-10-03 PROCEDURE — 77077 JOINT SURVEY SINGLE VIEW: CPT | Mod: TC

## 2017-10-03 PROCEDURE — 77077 JOINT SURVEY SINGLE VIEW: CPT | Mod: 26,,, | Performed by: RADIOLOGY

## 2017-10-09 ENCOUNTER — APPOINTMENT (OUTPATIENT)
Dept: RADIOLOGY | Facility: CLINIC | Age: 67
End: 2017-10-09
Attending: INTERNAL MEDICINE
Payer: MEDICARE

## 2017-10-09 DIAGNOSIS — Z78.0 MENOPAUSE: ICD-10-CM

## 2017-10-09 PROCEDURE — 77080 DXA BONE DENSITY AXIAL: CPT | Mod: TC

## 2017-10-09 PROCEDURE — 77080 DXA BONE DENSITY AXIAL: CPT | Mod: 26,,, | Performed by: INTERNAL MEDICINE

## 2017-11-28 ENCOUNTER — PATIENT OUTREACH (OUTPATIENT)
Dept: ADMINISTRATIVE | Facility: HOSPITAL | Age: 67
End: 2017-11-28

## 2017-11-28 NOTE — PROGRESS NOTES
Contacted pt about Fit Kit she received from PCP in September. She says she misplaced the kit but recently found it. She says she will collect sample and mail it in this week.

## 2018-02-09 DIAGNOSIS — M13.0 POLYARTHRITIS: ICD-10-CM

## 2018-02-09 DIAGNOSIS — M19.90 OSTEOARTHRITIS, UNSPECIFIED OSTEOARTHRITIS TYPE, UNSPECIFIED SITE: ICD-10-CM

## 2018-02-12 RX ORDER — MELOXICAM 7.5 MG/1
TABLET ORAL
Qty: 90 TABLET | Refills: 1 | Status: SHIPPED | OUTPATIENT
Start: 2018-02-12 | End: 2018-05-18 | Stop reason: SDUPTHER

## 2018-04-25 ENCOUNTER — PES CALL (OUTPATIENT)
Dept: ADMINISTRATIVE | Facility: CLINIC | Age: 68
End: 2018-04-25

## 2018-04-25 DIAGNOSIS — I10 ESSENTIAL HYPERTENSION: ICD-10-CM

## 2018-04-26 RX ORDER — LISINOPRIL 20 MG/1
TABLET ORAL
Qty: 90 TABLET | Refills: 11 | Status: SHIPPED | OUTPATIENT
Start: 2018-04-26 | End: 2019-05-28 | Stop reason: SDUPTHER

## 2018-04-26 RX ORDER — AMLODIPINE BESYLATE 5 MG/1
TABLET ORAL
Qty: 90 TABLET | Refills: 11 | Status: SHIPPED | OUTPATIENT
Start: 2018-04-26 | End: 2019-07-29 | Stop reason: SDUPTHER

## 2018-04-26 RX ORDER — FUROSEMIDE 20 MG/1
TABLET ORAL
Qty: 90 TABLET | Refills: 11 | Status: SHIPPED | OUTPATIENT
Start: 2018-04-26 | End: 2019-07-29 | Stop reason: SDUPTHER

## 2018-05-18 ENCOUNTER — TELEPHONE (OUTPATIENT)
Dept: INTERNAL MEDICINE | Facility: CLINIC | Age: 68
End: 2018-05-18

## 2018-05-18 DIAGNOSIS — M13.0 POLYARTHRITIS: ICD-10-CM

## 2018-05-18 DIAGNOSIS — M19.90 OSTEOARTHRITIS, UNSPECIFIED OSTEOARTHRITIS TYPE, UNSPECIFIED SITE: ICD-10-CM

## 2018-05-18 RX ORDER — MELOXICAM 7.5 MG/1
7.5 TABLET ORAL DAILY
Qty: 90 TABLET | Refills: 0 | Status: SHIPPED | OUTPATIENT
Start: 2018-05-18 | End: 2018-12-03 | Stop reason: SDUPTHER

## 2018-05-25 ENCOUNTER — OFFICE VISIT (OUTPATIENT)
Dept: INTERNAL MEDICINE | Facility: CLINIC | Age: 68
End: 2018-05-25
Payer: MEDICARE

## 2018-05-25 ENCOUNTER — LAB VISIT (OUTPATIENT)
Dept: LAB | Facility: HOSPITAL | Age: 68
End: 2018-05-25
Attending: INTERNAL MEDICINE
Payer: MEDICARE

## 2018-05-25 VITALS
BODY MASS INDEX: 39.68 KG/M2 | SYSTOLIC BLOOD PRESSURE: 102 MMHG | WEIGHT: 293 LBS | HEIGHT: 72 IN | DIASTOLIC BLOOD PRESSURE: 60 MMHG | HEART RATE: 64 BPM | OXYGEN SATURATION: 94 %

## 2018-05-25 DIAGNOSIS — L92.1 NECROBIOSIS LIPOIDICA: ICD-10-CM

## 2018-05-25 DIAGNOSIS — E66.01 MORBID OBESITY: ICD-10-CM

## 2018-05-25 DIAGNOSIS — M13.0 POLYARTHRITIS: ICD-10-CM

## 2018-05-25 DIAGNOSIS — E78.5 HYPERLIPIDEMIA, UNSPECIFIED HYPERLIPIDEMIA TYPE: ICD-10-CM

## 2018-05-25 DIAGNOSIS — H00.015 HORDEOLUM EXTERNUM OF LEFT LOWER EYELID: Primary | ICD-10-CM

## 2018-05-25 DIAGNOSIS — F17.200 SMOKER: ICD-10-CM

## 2018-05-25 DIAGNOSIS — I10 ESSENTIAL HYPERTENSION: ICD-10-CM

## 2018-05-25 LAB
ALBUMIN SERPL BCP-MCNC: 4 G/DL
ALP SERPL-CCNC: 77 U/L
ALT SERPL W/O P-5'-P-CCNC: 16 U/L
ANION GAP SERPL CALC-SCNC: 7 MMOL/L
AST SERPL-CCNC: 14 U/L
BASOPHILS # BLD AUTO: 0.04 K/UL
BASOPHILS NFR BLD: 0.4 %
BILIRUB SERPL-MCNC: 0.4 MG/DL
BUN SERPL-MCNC: 14 MG/DL
CALCIUM SERPL-MCNC: 9.6 MG/DL
CHLORIDE SERPL-SCNC: 105 MMOL/L
CHOLEST SERPL-MCNC: 197 MG/DL
CHOLEST/HDLC SERPL: 5.2 {RATIO}
CO2 SERPL-SCNC: 29 MMOL/L
CREAT SERPL-MCNC: 0.8 MG/DL
DIFFERENTIAL METHOD: NORMAL
EOSINOPHIL # BLD AUTO: 0.3 K/UL
EOSINOPHIL NFR BLD: 2.5 %
ERYTHROCYTE [DISTWIDTH] IN BLOOD BY AUTOMATED COUNT: 14.3 %
EST. GFR  (AFRICAN AMERICAN): >60 ML/MIN/1.73 M^2
EST. GFR  (NON AFRICAN AMERICAN): >60 ML/MIN/1.73 M^2
GLUCOSE SERPL-MCNC: 90 MG/DL
HCT VFR BLD AUTO: 42.6 %
HDLC SERPL-MCNC: 38 MG/DL
HDLC SERPL: 19.3 %
HGB BLD-MCNC: 14.1 G/DL
LDLC SERPL CALC-MCNC: 114.8 MG/DL
LYMPHOCYTES # BLD AUTO: 3.2 K/UL
LYMPHOCYTES NFR BLD: 29.4 %
MCH RBC QN AUTO: 30.9 PG
MCHC RBC AUTO-ENTMCNC: 33.1 G/DL
MCV RBC AUTO: 93 FL
MONOCYTES # BLD AUTO: 0.9 K/UL
MONOCYTES NFR BLD: 8.1 %
NEUTROPHILS # BLD AUTO: 6.5 K/UL
NEUTROPHILS NFR BLD: 59.3 %
NONHDLC SERPL-MCNC: 159 MG/DL
PLATELET # BLD AUTO: 252 K/UL
PMV BLD AUTO: 12.2 FL
POTASSIUM SERPL-SCNC: 4.6 MMOL/L
PROT SERPL-MCNC: 7.8 G/DL
RBC # BLD AUTO: 4.57 M/UL
SODIUM SERPL-SCNC: 141 MMOL/L
TRIGL SERPL-MCNC: 221 MG/DL
TSH SERPL DL<=0.005 MIU/L-ACNC: 1.44 UIU/ML
WBC # BLD AUTO: 10.93 K/UL

## 2018-05-25 PROCEDURE — 36415 COLL VENOUS BLD VENIPUNCTURE: CPT

## 2018-05-25 PROCEDURE — 99499 UNLISTED E&M SERVICE: CPT | Mod: HCNC,S$GLB,, | Performed by: INTERNAL MEDICINE

## 2018-05-25 PROCEDURE — 3074F SYST BP LT 130 MM HG: CPT | Mod: CPTII,S$GLB,, | Performed by: INTERNAL MEDICINE

## 2018-05-25 PROCEDURE — 85025 COMPLETE CBC W/AUTO DIFF WBC: CPT

## 2018-05-25 PROCEDURE — 80061 LIPID PANEL: CPT

## 2018-05-25 PROCEDURE — 3078F DIAST BP <80 MM HG: CPT | Mod: CPTII,S$GLB,, | Performed by: INTERNAL MEDICINE

## 2018-05-25 PROCEDURE — 80053 COMPREHEN METABOLIC PANEL: CPT

## 2018-05-25 PROCEDURE — 99214 OFFICE O/P EST MOD 30 MIN: CPT | Mod: S$GLB,,, | Performed by: INTERNAL MEDICINE

## 2018-05-25 PROCEDURE — 99999 PR PBB SHADOW E&M-EST. PATIENT-LVL III: CPT | Mod: PBBFAC,,, | Performed by: INTERNAL MEDICINE

## 2018-05-25 PROCEDURE — 84443 ASSAY THYROID STIM HORMONE: CPT

## 2018-05-25 RX ORDER — PRAVASTATIN SODIUM 20 MG/1
20 TABLET ORAL DAILY
Qty: 90 TABLET | Refills: 3 | Status: SHIPPED | OUTPATIENT
Start: 2018-05-25 | End: 2018-08-06

## 2018-05-25 RX ORDER — ERYTHROMYCIN 5 MG/G
OINTMENT OPHTHALMIC EVERY 6 HOURS
Qty: 3.5 G | Refills: 1 | Status: SHIPPED | OUTPATIENT
Start: 2018-05-25 | End: 2023-11-03

## 2018-05-25 NOTE — PROGRESS NOTES
Subjective:       Patient ID: Ana Patel is a 68 y.o. female.    Chief Complaint: Follow-up (has a stye in left eye)    Lower left eyelid bump.    jts have been stable, sometimes in shoulders, knees. Mos since last aleve use, occ tylenol arthritis. Still am stiffness, 30-60 minutes. Uses mobic low dose daily with good effect.  No specifically swollen jts.          Review of Systems   Constitutional: Negative for activity change, appetite change and unexpected weight change.   Eyes: Negative for visual disturbance.        Swelling lower L lid w/o any pus   Respiratory: Negative for cough, chest tightness and shortness of breath.    Cardiovascular: Negative for chest pain.   Gastrointestinal: Negative for abdominal distention and abdominal pain.   Genitourinary: Negative for difficulty urinating and urgency.        No breast lumps/masses/pain/nipple d/c in either breast     Musculoskeletal: Positive for arthralgias and joint swelling.        Endorses morning stiffness   Skin: Positive for rash (necrobiosis bilat legs and eczema).   Neurological: Negative for tremors, syncope and weakness.       Objective:      Physical Exam   Constitutional: She is oriented to person, place, and time. She appears well-developed and well-nourished. No distress.   HENT:   Head: Normocephalic and atraumatic.   Eyes: Pupils are equal, round, and reactive to light. No scleral icterus.   L lower eye lide with stye, no surrounding redness or warmth, not actually ttp.  No pus seen   Neck: Normal range of motion. No thyromegaly present.   Cardiovascular: Normal rate, regular rhythm and normal heart sounds.  Exam reveals no gallop and no friction rub.    No murmur heard.  Pulmonary/Chest: Effort normal and breath sounds normal. No respiratory distress. She has no wheezes. She has no rales.   Abdominal: Soft. Bowel sounds are normal. She exhibits no distension and no mass. There is no tenderness. There is no rebound and no guarding.    Musculoskeletal: Normal range of motion. She exhibits edema.   Flat feet bilat with lateral deviation of the toes.    No nail pitting.    Mild leg swelling bilat    Knee crepitus bilat w/preserved rom    Neg FABERE testing bilat   Lymphadenopathy:     She has no cervical adenopathy.   Neurological: She is alert and oriented to person, place, and time.   Skin: She is not diaphoretic.   No obvious psoriatic plaques seen today   Psychiatric: She has a normal mood and affect. Her speech is normal and behavior is normal. Cognition and memory are normal.       Assessment:       1. Hordeolum externum of left lower eyelid    2. Necrobiosis lipoidica    3. Essential hypertension    4. Polyarthritis    5. Morbid obesity    6. Hyperlipidemia, unspecified hyperlipidemia type    7. Smoker        Plan:       Ana was seen today for follow-up.    Diagnoses and all orders for this visit:    Hordeolum externum of left lower eyelid  -     erythromycin (ROMYCIN) ophthalmic ointment; Place into the left eye every 6 (six) hours.  [id number] warm compresses, if not btr then start eye gtts.  Explained that if not better in 3-4 days, pt should rtc/call PCP        Necrobiosis lipoidica  No flare    Essential hypertension  -     CBC auto differential; Future  -     Comprehensive metabolic panel; Future  -     Lipid panel; Future  controlled    Polyarthritis  -     TSH; Future  She declines rheum referral. She does not have clear psoriasis and imaging leaned toward OA. Continue mobic and tylenol prn    Morbid obesity  Has been stable, needs to eat less.    Hyperlipidemia, unspecified hyperlipidemia type  -     pravastatin (PRAVACHOL) 20 MG tablet; Take 1 tablet (20 mg total) by mouth once daily.  -     TSH; Future  The 10-year ASCVD risk score ([place] FANNY Jr., et al., 2013) is: 10.6%    Values used to calculate the score:      Age: 68 years      Sex: Female      Is Non- : No      Diabetic: No      Tobacco smoker: Yes       Systolic Blood Pressure: 102 mmHg      Is BP treated: Yes      HDL Cholesterol: 42 mg/dL      Total Cholesterol: 208 mg/dL      Smoker  -     Ambulatory referral to Smoking Cessation Program  Lengthy discussion re importance of cessation      Health Maintenance       Date Due Completion Date    Colonoscopy 01/31/2002 ---    Influenza Vaccine 08/01/2018 9/22/2017 (Done)    Override on 9/22/2017: Done    Override on 3/7/2017: Done    Pneumococcal (65+) (2 of 2 - PPSV23) 08/24/2018 8/24/2017    Mammogram 03/09/2019 3/9/2017    DEXA SCAN 10/09/2020 10/9/2017    Lipid Panel 03/08/2022 3/8/2017    TETANUS VACCINE 08/24/2027 8/24/2017 (Declined)    Override on 8/24/2017: Declined      Dropped off stool test today    Follow-up in about 6 months (around 11/25/2018).

## 2018-05-28 ENCOUNTER — LAB VISIT (OUTPATIENT)
Dept: LAB | Facility: HOSPITAL | Age: 68
End: 2018-05-28
Attending: INTERNAL MEDICINE
Payer: MEDICARE

## 2018-05-28 DIAGNOSIS — Z12.11 COLON CANCER SCREENING: ICD-10-CM

## 2018-05-28 LAB — HEMOCCULT STL QL IA: NEGATIVE

## 2018-05-28 PROCEDURE — 82274 ASSAY TEST FOR BLOOD FECAL: CPT

## 2018-08-06 ENCOUNTER — PATIENT MESSAGE (OUTPATIENT)
Dept: INTERNAL MEDICINE | Facility: CLINIC | Age: 68
End: 2018-08-06

## 2018-08-06 ENCOUNTER — TELEPHONE (OUTPATIENT)
Dept: INTERNAL MEDICINE | Facility: CLINIC | Age: 68
End: 2018-08-06

## 2018-12-03 ENCOUNTER — OFFICE VISIT (OUTPATIENT)
Dept: INTERNAL MEDICINE | Facility: CLINIC | Age: 68
End: 2018-12-03
Payer: MEDICARE

## 2018-12-03 VITALS
SYSTOLIC BLOOD PRESSURE: 122 MMHG | WEIGHT: 293 LBS | HEIGHT: 72 IN | DIASTOLIC BLOOD PRESSURE: 60 MMHG | HEART RATE: 68 BPM | OXYGEN SATURATION: 95 % | BODY MASS INDEX: 39.68 KG/M2

## 2018-12-03 DIAGNOSIS — M13.0 POLYARTHRITIS: ICD-10-CM

## 2018-12-03 DIAGNOSIS — M19.90 OSTEOARTHRITIS, UNSPECIFIED OSTEOARTHRITIS TYPE, UNSPECIFIED SITE: ICD-10-CM

## 2018-12-03 DIAGNOSIS — Z01.00 EYE EXAM, ROUTINE: Primary | ICD-10-CM

## 2018-12-03 PROCEDURE — 99999 PR PBB SHADOW E&M-EST. PATIENT-LVL IV: CPT | Mod: PBBFAC,HCNC,, | Performed by: INTERNAL MEDICINE

## 2018-12-03 PROCEDURE — 1101F PT FALLS ASSESS-DOCD LE1/YR: CPT | Mod: CPTII,HCNC,S$GLB, | Performed by: INTERNAL MEDICINE

## 2018-12-03 PROCEDURE — 3074F SYST BP LT 130 MM HG: CPT | Mod: CPTII,HCNC,S$GLB, | Performed by: INTERNAL MEDICINE

## 2018-12-03 PROCEDURE — 99214 OFFICE O/P EST MOD 30 MIN: CPT | Mod: HCNC,S$GLB,, | Performed by: INTERNAL MEDICINE

## 2018-12-03 PROCEDURE — 3078F DIAST BP <80 MM HG: CPT | Mod: CPTII,HCNC,S$GLB, | Performed by: INTERNAL MEDICINE

## 2018-12-03 RX ORDER — GLUCOSAM/CHONDRO/HERB 149/HYAL 750-100 MG
1 TABLET ORAL 2 TIMES DAILY
Status: ON HOLD
Start: 2018-12-03 | End: 2022-03-08

## 2018-12-03 RX ORDER — MELOXICAM 7.5 MG/1
7.5 TABLET ORAL DAILY
Qty: 90 TABLET | Refills: 1 | Status: SHIPPED | OUTPATIENT
Start: 2018-12-03 | End: 2019-04-22 | Stop reason: SDUPTHER

## 2018-12-03 NOTE — PROGRESS NOTES
Subjective:       Patient ID: Ana Patel is a 68 y.o. female.    Chief Complaint: Follow-up (6 month, may need refills)    Patient is here for followup for chronic conditions.    Continuous jeanette horses on statin, prefers to try omega 3 instead of other statin.    OA pains are stable. Cane helps when she is out.      Review of Systems   Constitutional: Negative for activity change, appetite change and unexpected weight change.   Eyes: Negative for visual disturbance.        Swelling lower L lid w/o any pus   Respiratory: Negative for cough, chest tightness and shortness of breath.    Cardiovascular: Negative for chest pain.   Gastrointestinal: Negative for abdominal distention and abdominal pain.   Genitourinary: Negative for difficulty urinating and urgency.        No breast lumps/masses/pain/nipple d/c in either breast     Musculoskeletal: Positive for arthralgias. Negative for joint swelling.   Skin: Positive for rash (necrobiosis bilat legs and eczema).   Neurological: Negative for tremors, syncope and weakness.       Objective:      Physical Exam   Constitutional: She is oriented to person, place, and time. She appears well-developed and well-nourished. No distress.   HENT:   Head: Normocephalic and atraumatic.   Eyes: EOM are normal. Pupils are equal, round, and reactive to light. No scleral icterus.   Neck: Normal range of motion. No thyromegaly present.   Cardiovascular: Normal rate, regular rhythm and normal heart sounds. Exam reveals no gallop and no friction rub.   No murmur heard.  Pulmonary/Chest: Effort normal and breath sounds normal. No respiratory distress. She has no wheezes. She has no rales.   Abdominal: Soft. Bowel sounds are normal. She exhibits no distension and no mass. There is no tenderness. There is no rebound and no guarding.   Musculoskeletal: Normal range of motion. She exhibits no edema.   Lymphadenopathy:     She has no cervical adenopathy.   Neurological: She is alert and  oriented to person, place, and time.   Skin: She is not diaphoretic.   No obvious psoriatic plaques seen today, necrobiosis on anterior shins bilat   Psychiatric: She has a normal mood and affect. Her speech is normal and behavior is normal. Cognition and memory are normal.       Assessment:       1. Eye exam, routine    2. Osteoarthritis, unspecified osteoarthritis type, unspecified site    3. Polyarthritis        Plan:       Ana was seen today for follow-up.    Diagnoses and all orders for this visit:    Eye exam, routine  -     Ambulatory Referral to Optometry    Osteoarthritis, unspecified osteoarthritis type, unspecified site  -     meloxicam (MOBIC) 7.5 MG tablet; Take 1 tablet (7.5 mg total) by mouth once daily.  Polyarthritis  -     meloxicam (MOBIC) 7.5 MG tablet; Take 1 tablet (7.5 mg total) by mouth once daily.  helps    Other orders  -     omega 3-dha-epa-fish oil (FISH OIL) 1,000 mg (120 mg-180 mg) Cap; Take 1 capsule by mouth 2 (two) times daily.  She prefers no statin    Patient Instructions   Smoking cessation trust -- [phone], https://www.smokingcessationtrust.org/      05263: Smoking and tobacco cessation counseling visit; intensive, greater than 10 minutes  Not ready to set QD      Health Maintenance       Date Due Completion Date    Influenza Vaccine 08/01/2018 9/22/2017 (Done)    Override on 9/22/2017: Done    Override on 3/7/2017: Done    Mammogram 03/09/2019 3/9/2017    Fecal Occult Blood Test (FOBT)/FitKit 05/28/2019 5/28/2018    Pneumococcal Vaccine (65+ Low/Medium Risk) (2 of 2 - PPSV23) 08/21/2020 8/24/2017    DEXA SCAN 10/09/2020 10/9/2017    Lipid Panel 05/25/2023 5/25/2018    TETANUS VACCINE 08/24/2027 8/24/2017 (Declined)    Override on 8/24/2017: Declined          Follow-up in about 6 months (around 6/3/2019).    Future Appointments   Date Time Provider Department Center   1/14/2019 11:00 AM [facility]

## 2018-12-04 ENCOUNTER — PATIENT MESSAGE (OUTPATIENT)
Dept: INTERNAL MEDICINE | Facility: CLINIC | Age: 68
End: 2018-12-04

## 2019-03-04 ENCOUNTER — OFFICE VISIT (OUTPATIENT)
Dept: INTERNAL MEDICINE | Facility: CLINIC | Age: 69
End: 2019-03-04
Payer: MEDICARE

## 2019-03-04 VITALS
OXYGEN SATURATION: 95 % | HEART RATE: 70 BPM | SYSTOLIC BLOOD PRESSURE: 126 MMHG | WEIGHT: 287.94 LBS | HEIGHT: 72 IN | DIASTOLIC BLOOD PRESSURE: 68 MMHG | BODY MASS INDEX: 39 KG/M2

## 2019-03-04 DIAGNOSIS — F43.20 ADJUSTMENT DISORDER, UNSPECIFIED TYPE: Primary | ICD-10-CM

## 2019-03-04 PROCEDURE — 99214 OFFICE O/P EST MOD 30 MIN: CPT | Mod: HCNC,S$GLB,, | Performed by: INTERNAL MEDICINE

## 2019-03-04 PROCEDURE — 1101F PT FALLS ASSESS-DOCD LE1/YR: CPT | Mod: HCNC,CPTII,S$GLB, | Performed by: INTERNAL MEDICINE

## 2019-03-04 PROCEDURE — 3078F PR MOST RECENT DIASTOLIC BLOOD PRESSURE < 80 MM HG: ICD-10-PCS | Mod: HCNC,CPTII,S$GLB, | Performed by: INTERNAL MEDICINE

## 2019-03-04 PROCEDURE — 99214 PR OFFICE/OUTPT VISIT, EST, LEVL IV, 30-39 MIN: ICD-10-PCS | Mod: HCNC,S$GLB,, | Performed by: INTERNAL MEDICINE

## 2019-03-04 PROCEDURE — 3078F DIAST BP <80 MM HG: CPT | Mod: HCNC,CPTII,S$GLB, | Performed by: INTERNAL MEDICINE

## 2019-03-04 PROCEDURE — 3074F SYST BP LT 130 MM HG: CPT | Mod: HCNC,CPTII,S$GLB, | Performed by: INTERNAL MEDICINE

## 2019-03-04 PROCEDURE — 99999 PR PBB SHADOW E&M-EST. PATIENT-LVL III: ICD-10-PCS | Mod: PBBFAC,HCNC,, | Performed by: INTERNAL MEDICINE

## 2019-03-04 PROCEDURE — 3074F PR MOST RECENT SYSTOLIC BLOOD PRESSURE < 130 MM HG: ICD-10-PCS | Mod: HCNC,CPTII,S$GLB, | Performed by: INTERNAL MEDICINE

## 2019-03-04 PROCEDURE — 99999 PR PBB SHADOW E&M-EST. PATIENT-LVL III: CPT | Mod: PBBFAC,HCNC,, | Performed by: INTERNAL MEDICINE

## 2019-03-04 PROCEDURE — 1101F PR PT FALLS ASSESS DOC 0-1 FALLS W/OUT INJ PAST YR: ICD-10-PCS | Mod: HCNC,CPTII,S$GLB, | Performed by: INTERNAL MEDICINE

## 2019-03-04 RX ORDER — ACETAMINOPHEN 500 MG/1
CAPSULE, LIQUID FILLED ORAL
COMMUNITY
Start: 2019-02-06

## 2019-03-04 NOTE — PROGRESS NOTES
"Subjective:       Patient ID: Ana Patel is a 69 y.o. female.    Chief Complaint: Insomnia (hard time falling asleep )    Patient is here for followup for chronic conditions.    Poor sleep and increased anxiety since she found out that her  has been shopping excessively with a shopping "addiction". He has been treated inpt for this issue and is now back at home. Also increased stress from their daughter, who is in drug recovery, who they just found out is 7 mos pregnant.    She has had 1-2 other adjustment disorders around family stress.    She has not been drinking increased alcohol and no drug use hx.      Review of Systems   Psychiatric/Behavioral: Positive for dysphoric mood and sleep disturbance. Negative for agitation, confusion, decreased concentration, self-injury and suicidal ideas. The patient is nervous/anxious.        Objective:      Physical Exam   Constitutional: She appears well-developed and well-nourished. No distress.   Skin: She is not diaphoretic.   Psychiatric: She has a normal mood and affect. Her behavior is normal. Judgment and thought content normal.       Assessment:       No diagnosis found.    Plan:       Here for adjustment disorder with sleep difficulty and anxiety.  She would like to take tylenol PM, which has helped recently. I explained that it is OK to use. I offered Trazodone if she needs an rx for sleep. She has had previous bad reaction to ambine.  Patient Instructions   Let me know if you would like to take celexa/citalopram depression  she prefers not to start an ssri at this time.    Future Appointments   Date Time Provider Department Center   6/10/2019 11:20 AM Gordo Naik MD [facility] [address] [facility]     Over 25 minutes spent with patient and majority in counseling and patient education.        "

## 2019-03-08 ENCOUNTER — PES CALL (OUTPATIENT)
Dept: ADMINISTRATIVE | Facility: CLINIC | Age: 69
End: 2019-03-08

## 2019-03-13 ENCOUNTER — PES CALL (OUTPATIENT)
Dept: ADMINISTRATIVE | Facility: CLINIC | Age: 69
End: 2019-03-13

## 2019-04-05 DIAGNOSIS — Z12.39 BREAST CANCER SCREENING: ICD-10-CM

## 2019-04-22 DIAGNOSIS — M19.90 OSTEOARTHRITIS, UNSPECIFIED OSTEOARTHRITIS TYPE, UNSPECIFIED SITE: ICD-10-CM

## 2019-04-22 DIAGNOSIS — M13.0 POLYARTHRITIS: ICD-10-CM

## 2019-04-22 RX ORDER — MELOXICAM 7.5 MG/1
TABLET ORAL
Qty: 90 TABLET | Refills: 1 | Status: SHIPPED | OUTPATIENT
Start: 2019-04-22 | End: 2019-09-10 | Stop reason: SDUPTHER

## 2019-05-28 DIAGNOSIS — I10 ESSENTIAL HYPERTENSION: ICD-10-CM

## 2019-05-29 RX ORDER — LISINOPRIL 20 MG/1
TABLET ORAL
Qty: 90 TABLET | Refills: 11 | Status: SHIPPED | OUTPATIENT
Start: 2019-05-29 | End: 2020-06-26

## 2019-06-18 ENCOUNTER — PES CALL (OUTPATIENT)
Dept: ADMINISTRATIVE | Facility: CLINIC | Age: 69
End: 2019-06-18

## 2019-07-16 ENCOUNTER — TELEPHONE (OUTPATIENT)
Dept: INTERNAL MEDICINE | Facility: CLINIC | Age: 69
End: 2019-07-16

## 2019-07-29 ENCOUNTER — LAB VISIT (OUTPATIENT)
Dept: LAB | Facility: HOSPITAL | Age: 69
End: 2019-07-29
Attending: INTERNAL MEDICINE
Payer: MEDICARE

## 2019-07-29 ENCOUNTER — OFFICE VISIT (OUTPATIENT)
Dept: INTERNAL MEDICINE | Facility: CLINIC | Age: 69
End: 2019-07-29
Payer: MEDICARE

## 2019-07-29 VITALS
OXYGEN SATURATION: 95 % | SYSTOLIC BLOOD PRESSURE: 128 MMHG | DIASTOLIC BLOOD PRESSURE: 68 MMHG | HEIGHT: 72 IN | BODY MASS INDEX: 39.39 KG/M2 | WEIGHT: 290.81 LBS | HEART RATE: 61 BPM

## 2019-07-29 DIAGNOSIS — I10 ESSENTIAL HYPERTENSION: ICD-10-CM

## 2019-07-29 DIAGNOSIS — F17.200 SMOKER: ICD-10-CM

## 2019-07-29 DIAGNOSIS — Z12.11 COLON CANCER SCREENING: ICD-10-CM

## 2019-07-29 DIAGNOSIS — E66.01 MORBID OBESITY: ICD-10-CM

## 2019-07-29 DIAGNOSIS — F43.20 ADJUSTMENT DISORDER, UNSPECIFIED TYPE: Primary | ICD-10-CM

## 2019-07-29 DIAGNOSIS — E78.5 HYPERLIPIDEMIA, UNSPECIFIED HYPERLIPIDEMIA TYPE: ICD-10-CM

## 2019-07-29 LAB
ALBUMIN SERPL BCP-MCNC: 4.1 G/DL (ref 3.5–5.2)
ALP SERPL-CCNC: 77 U/L (ref 55–135)
ALT SERPL W/O P-5'-P-CCNC: 13 U/L (ref 10–44)
ANION GAP SERPL CALC-SCNC: 13 MMOL/L (ref 8–16)
AST SERPL-CCNC: 13 U/L (ref 10–40)
BASOPHILS # BLD AUTO: 0.06 K/UL (ref 0–0.2)
BASOPHILS NFR BLD: 0.6 % (ref 0–1.9)
BILIRUB SERPL-MCNC: 0.3 MG/DL (ref 0.1–1)
BUN SERPL-MCNC: 13 MG/DL (ref 8–23)
CALCIUM SERPL-MCNC: 9.5 MG/DL (ref 8.7–10.5)
CHLORIDE SERPL-SCNC: 103 MMOL/L (ref 95–110)
CHOLEST SERPL-MCNC: 209 MG/DL (ref 120–199)
CHOLEST/HDLC SERPL: 5.2 {RATIO} (ref 2–5)
CO2 SERPL-SCNC: 25 MMOL/L (ref 23–29)
CREAT SERPL-MCNC: 0.8 MG/DL (ref 0.5–1.4)
DIFFERENTIAL METHOD: NORMAL
EOSINOPHIL # BLD AUTO: 0.3 K/UL (ref 0–0.5)
EOSINOPHIL NFR BLD: 3.1 % (ref 0–8)
ERYTHROCYTE [DISTWIDTH] IN BLOOD BY AUTOMATED COUNT: 14.5 % (ref 11.5–14.5)
EST. GFR  (AFRICAN AMERICAN): >60 ML/MIN/1.73 M^2
EST. GFR  (NON AFRICAN AMERICAN): >60 ML/MIN/1.73 M^2
GLUCOSE SERPL-MCNC: 80 MG/DL (ref 70–110)
HCT VFR BLD AUTO: 42.5 % (ref 37–48.5)
HDLC SERPL-MCNC: 40 MG/DL (ref 40–75)
HDLC SERPL: 19.1 % (ref 20–50)
HGB BLD-MCNC: 13.7 G/DL (ref 12–16)
LDLC SERPL CALC-MCNC: 113.8 MG/DL (ref 63–159)
LYMPHOCYTES # BLD AUTO: 3 K/UL (ref 1–4.8)
LYMPHOCYTES NFR BLD: 29.6 % (ref 18–48)
MCH RBC QN AUTO: 31 PG (ref 27–31)
MCHC RBC AUTO-ENTMCNC: 32.2 G/DL (ref 32–36)
MCV RBC AUTO: 96 FL (ref 82–98)
MONOCYTES # BLD AUTO: 0.8 K/UL (ref 0.3–1)
MONOCYTES NFR BLD: 7.4 % (ref 4–15)
NEUTROPHILS # BLD AUTO: 6 K/UL (ref 1.8–7.7)
NEUTROPHILS NFR BLD: 59.3 % (ref 38–73)
NONHDLC SERPL-MCNC: 169 MG/DL
PLATELET # BLD AUTO: 209 K/UL (ref 150–350)
PMV BLD AUTO: 12.2 FL (ref 9.2–12.9)
POTASSIUM SERPL-SCNC: 4.1 MMOL/L (ref 3.5–5.1)
PROT SERPL-MCNC: 7.9 G/DL (ref 6–8.4)
RBC # BLD AUTO: 4.42 M/UL (ref 4–5.4)
SODIUM SERPL-SCNC: 141 MMOL/L (ref 136–145)
TRIGL SERPL-MCNC: 276 MG/DL (ref 30–150)
WBC # BLD AUTO: 10.15 K/UL (ref 3.9–12.7)

## 2019-07-29 PROCEDURE — 1101F PT FALLS ASSESS-DOCD LE1/YR: CPT | Mod: HCNC,CPTII,S$GLB, | Performed by: INTERNAL MEDICINE

## 2019-07-29 PROCEDURE — 99999 PR PBB SHADOW E&M-EST. PATIENT-LVL III: ICD-10-PCS | Mod: PBBFAC,HCNC,, | Performed by: INTERNAL MEDICINE

## 2019-07-29 PROCEDURE — 3078F DIAST BP <80 MM HG: CPT | Mod: HCNC,CPTII,S$GLB, | Performed by: INTERNAL MEDICINE

## 2019-07-29 PROCEDURE — 80053 COMPREHEN METABOLIC PANEL: CPT | Mod: HCNC

## 2019-07-29 PROCEDURE — 3074F SYST BP LT 130 MM HG: CPT | Mod: HCNC,CPTII,S$GLB, | Performed by: INTERNAL MEDICINE

## 2019-07-29 PROCEDURE — 99214 OFFICE O/P EST MOD 30 MIN: CPT | Mod: HCNC,S$GLB,, | Performed by: INTERNAL MEDICINE

## 2019-07-29 PROCEDURE — 99214 PR OFFICE/OUTPT VISIT, EST, LEVL IV, 30-39 MIN: ICD-10-PCS | Mod: HCNC,S$GLB,, | Performed by: INTERNAL MEDICINE

## 2019-07-29 PROCEDURE — 36415 COLL VENOUS BLD VENIPUNCTURE: CPT | Mod: HCNC

## 2019-07-29 PROCEDURE — 80061 LIPID PANEL: CPT | Mod: HCNC

## 2019-07-29 PROCEDURE — 85025 COMPLETE CBC W/AUTO DIFF WBC: CPT | Mod: HCNC

## 2019-07-29 PROCEDURE — 3078F PR MOST RECENT DIASTOLIC BLOOD PRESSURE < 80 MM HG: ICD-10-PCS | Mod: HCNC,CPTII,S$GLB, | Performed by: INTERNAL MEDICINE

## 2019-07-29 PROCEDURE — 1101F PR PT FALLS ASSESS DOC 0-1 FALLS W/OUT INJ PAST YR: ICD-10-PCS | Mod: HCNC,CPTII,S$GLB, | Performed by: INTERNAL MEDICINE

## 2019-07-29 PROCEDURE — 3074F PR MOST RECENT SYSTOLIC BLOOD PRESSURE < 130 MM HG: ICD-10-PCS | Mod: HCNC,CPTII,S$GLB, | Performed by: INTERNAL MEDICINE

## 2019-07-29 PROCEDURE — 99999 PR PBB SHADOW E&M-EST. PATIENT-LVL III: CPT | Mod: PBBFAC,HCNC,, | Performed by: INTERNAL MEDICINE

## 2019-07-29 RX ORDER — FUROSEMIDE 20 MG/1
TABLET ORAL
Qty: 90 TABLET | Refills: 11 | Status: SHIPPED | OUTPATIENT
Start: 2019-07-29 | End: 2020-07-14 | Stop reason: SDUPTHER

## 2019-07-29 RX ORDER — AMLODIPINE BESYLATE 5 MG/1
TABLET ORAL
Qty: 90 TABLET | Refills: 11 | Status: SHIPPED | OUTPATIENT
Start: 2019-07-29 | End: 2020-07-14 | Stop reason: SDUPTHER

## 2019-07-29 NOTE — PROGRESS NOTES
Subjective:       Patient ID: Ana Patel is a 69 y.o. female.    Chief Complaint: Follow-up (3 month )    Patient is here for followup for chronic conditions.    mood has been btr and less anxious.    Chronic knee pains, abt the same, mobic helps.    Review of Systems   Constitutional: Negative for activity change, appetite change and unexpected weight change.   Eyes: Negative for visual disturbance.   Respiratory: Negative for cough, chest tightness and shortness of breath.    Cardiovascular: Negative for chest pain.   Gastrointestinal: Negative for abdominal distention and abdominal pain.   Genitourinary: Negative for difficulty urinating and urgency.   Musculoskeletal: Positive for arthralgias. Negative for joint swelling.   Skin: Positive for rash (necrobiosis bilat legs and eczema).   Neurological: Negative for tremors, syncope and weakness.       Objective:      Physical Exam   Constitutional: She is oriented to person, place, and time. She appears well-developed and well-nourished. No distress.   HENT:   Head: Normocephalic and atraumatic.   Eyes: Pupils are equal, round, and reactive to light. EOM are normal. No scleral icterus.   Neck: Normal range of motion. No thyromegaly present.   Cardiovascular: Normal rate, regular rhythm and normal heart sounds. Exam reveals no gallop and no friction rub.   No murmur heard.  Pulmonary/Chest: Effort normal and breath sounds normal. No respiratory distress. She has no wheezes. She has no rales.   Abdominal: Soft. Bowel sounds are normal. She exhibits no distension and no mass. There is no tenderness. There is no rebound and no guarding.   Musculoskeletal: Normal range of motion. She exhibits no edema.   Valgus LLE leg.  Knee crepitus bilat.   Lymphadenopathy:     She has no cervical adenopathy.   Neurological: She is alert and oriented to person, place, and time.   Skin: She is not diaphoretic.   No obvious psoriatic plaques seen today, necrobiosis on anterior  madeleine medina   Psychiatric: She has a normal mood and affect. Her speech is normal and behavior is normal. Cognition and memory are normal.       Assessment:       1. Adjustment disorder, unspecified type    2. Essential hypertension    3. Smoker    4. Colon cancer screening    5. Hyperlipidemia, unspecified hyperlipidemia type    6. Morbid obesity        Plan:       Ana was seen today for follow-up.    Diagnoses and all orders for this visit:    Adjustment disorder, unspecified type  Doing btr no need ssri    Essential hypertension  -     amLODIPine (NORVASC) 5 MG tablet; TAKE 1 TABLET ONE TIME DAILY  -     furosemide (LASIX) 20 MG tablet; TAKE 1 TABLET ONE TIME DAILY  -     CBC auto differential; Future  -     Comprehensive metabolic panel; Future  -     Lipid panel; Future  At goal    Smoker  [id number]: Smoking and tobacco cessation counseling visit; intensive, greater than 10 minutes  Not ready to quit    Colon cancer screening  -     Fecal Immunochemical Test (iFOBT); Future  Prefers to colo    Hyperlipidemia, unspecified hyperlipidemia type  Declines statin    Obesity -- discussed, next time offer bariatric med    Health Maintenance       Date Due Completion Date    Shingles Vaccine (2 of 3) 10/16/2015 8/21/2015    Mammogram 03/09/2019 3/9/2017    Fecal Occult Blood Test (FOBT)/FitKit 05/28/2019 5/28/2018    Influenza Vaccine 08/01/2019 10/15/2018    Override on 9/22/2017: Done    Override on 3/7/2017: Done    Pneumococcal Vaccine (65+ Low/Medium Risk) (2 of 2 - PPSV23) 08/21/2020 8/24/2017    DEXA SCAN 10/09/2020 10/9/2017    Lipid Panel 07/29/2024 7/29/2019    TETANUS VACCINE 08/24/2027 8/24/2017 (Declined)    Override on 8/24/2017: Declined      Had zostavax  rtc for mammo    Follow up in about 6 months (around 1/29/2020).    Future Appointments   Date Time Provider Department Center   8/6/2019  9:45 AM [id number]

## 2019-09-03 ENCOUNTER — HOSPITAL ENCOUNTER (OUTPATIENT)
Dept: RADIOLOGY | Facility: HOSPITAL | Age: 69
Discharge: HOME | End: 2019-09-03
Attending: INTERNAL MEDICINE
Payer: MEDICARE

## 2019-09-03 DIAGNOSIS — Z12.39 BREAST CANCER SCREENING: ICD-10-CM

## 2019-09-03 PROCEDURE — 77063 MAMMO DIGITAL SCREENING BILAT WITH TOMOSYNTHESIS_CAD: ICD-10-PCS | Mod: 26,HCNC,, | Performed by: RADIOLOGY

## 2019-09-03 PROCEDURE — 77063 BREAST TOMOSYNTHESIS BI: CPT | Mod: 26,HCNC,, | Performed by: RADIOLOGY

## 2019-09-03 PROCEDURE — 77067 MAMMO DIGITAL SCREENING BILAT WITH TOMOSYNTHESIS_CAD: ICD-10-PCS | Mod: 26,HCNC,, | Performed by: RADIOLOGY

## 2019-09-03 PROCEDURE — 77067 SCR MAMMO BI INCL CAD: CPT | Mod: TC,HCNC

## 2019-09-03 PROCEDURE — 77067 SCR MAMMO BI INCL CAD: CPT | Mod: 26,HCNC,, | Performed by: RADIOLOGY

## 2019-09-10 DIAGNOSIS — M13.0 POLYARTHRITIS: ICD-10-CM

## 2019-09-10 DIAGNOSIS — M19.90 OSTEOARTHRITIS, UNSPECIFIED OSTEOARTHRITIS TYPE, UNSPECIFIED SITE: ICD-10-CM

## 2019-09-10 RX ORDER — MELOXICAM 7.5 MG/1
TABLET ORAL
Qty: 90 TABLET | Refills: 1 | Status: SHIPPED | OUTPATIENT
Start: 2019-09-10 | End: 2020-01-23 | Stop reason: SDUPTHER

## 2019-11-19 ENCOUNTER — TELEPHONE (OUTPATIENT)
Dept: INTERNAL MEDICINE | Facility: CLINIC | Age: 69
End: 2019-11-19

## 2019-11-19 NOTE — TELEPHONE ENCOUNTER
Hi, please call the patient -- the patient is due for the colon cancer stool test screening. Please ask if the patient still has the kit at home. If the kit is still at home please ask the patient to do the test again like last year and mail it in.  Let me know if patient has any questions and if the patient needs a new test kit.  Thank you, Gordo Naik

## 2020-01-23 ENCOUNTER — OFFICE VISIT (OUTPATIENT)
Dept: INTERNAL MEDICINE | Facility: CLINIC | Age: 70
End: 2020-01-23
Payer: MEDICARE

## 2020-01-23 VITALS
WEIGHT: 293 LBS | SYSTOLIC BLOOD PRESSURE: 138 MMHG | BODY MASS INDEX: 39.68 KG/M2 | DIASTOLIC BLOOD PRESSURE: 74 MMHG | OXYGEN SATURATION: 99 % | HEIGHT: 72 IN | HEART RATE: 72 BPM

## 2020-01-23 DIAGNOSIS — I10 ESSENTIAL HYPERTENSION: ICD-10-CM

## 2020-01-23 DIAGNOSIS — E78.5 HYPERLIPIDEMIA, UNSPECIFIED HYPERLIPIDEMIA TYPE: ICD-10-CM

## 2020-01-23 DIAGNOSIS — M19.90 OSTEOARTHRITIS, UNSPECIFIED OSTEOARTHRITIS TYPE, UNSPECIFIED SITE: ICD-10-CM

## 2020-01-23 DIAGNOSIS — F17.200 SMOKER: ICD-10-CM

## 2020-01-23 DIAGNOSIS — Z12.11 COLON CANCER SCREENING: Primary | ICD-10-CM

## 2020-01-23 DIAGNOSIS — M13.0 POLYARTHRITIS: ICD-10-CM

## 2020-01-23 DIAGNOSIS — E66.01 MORBID OBESITY: ICD-10-CM

## 2020-01-23 PROCEDURE — 1126F AMNT PAIN NOTED NONE PRSNT: CPT | Mod: HCNC,S$GLB,, | Performed by: INTERNAL MEDICINE

## 2020-01-23 PROCEDURE — 99214 PR OFFICE/OUTPT VISIT, EST, LEVL IV, 30-39 MIN: ICD-10-PCS | Mod: HCNC,S$GLB,, | Performed by: INTERNAL MEDICINE

## 2020-01-23 PROCEDURE — 99499 RISK ADDL DX/OHS AUDIT: ICD-10-PCS | Mod: S$GLB,,, | Performed by: INTERNAL MEDICINE

## 2020-01-23 PROCEDURE — 99999 PR PBB SHADOW E&M-EST. PATIENT-LVL III: CPT | Mod: PBBFAC,HCNC,, | Performed by: INTERNAL MEDICINE

## 2020-01-23 PROCEDURE — 1101F PT FALLS ASSESS-DOCD LE1/YR: CPT | Mod: HCNC,CPTII,S$GLB, | Performed by: INTERNAL MEDICINE

## 2020-01-23 PROCEDURE — 1159F MED LIST DOCD IN RCRD: CPT | Mod: HCNC,S$GLB,, | Performed by: INTERNAL MEDICINE

## 2020-01-23 PROCEDURE — 3075F PR MOST RECENT SYSTOLIC BLOOD PRESS GE 130-139MM HG: ICD-10-PCS | Mod: HCNC,CPTII,S$GLB, | Performed by: INTERNAL MEDICINE

## 2020-01-23 PROCEDURE — 99999 PR PBB SHADOW E&M-EST. PATIENT-LVL III: ICD-10-PCS | Mod: PBBFAC,HCNC,, | Performed by: INTERNAL MEDICINE

## 2020-01-23 PROCEDURE — 1126F PR PAIN SEVERITY QUANTIFIED, NO PAIN PRESENT: ICD-10-PCS | Mod: HCNC,S$GLB,, | Performed by: INTERNAL MEDICINE

## 2020-01-23 PROCEDURE — 3078F DIAST BP <80 MM HG: CPT | Mod: HCNC,CPTII,S$GLB, | Performed by: INTERNAL MEDICINE

## 2020-01-23 PROCEDURE — 1159F PR MEDICATION LIST DOCUMENTED IN MEDICAL RECORD: ICD-10-PCS | Mod: HCNC,S$GLB,, | Performed by: INTERNAL MEDICINE

## 2020-01-23 PROCEDURE — 3078F PR MOST RECENT DIASTOLIC BLOOD PRESSURE < 80 MM HG: ICD-10-PCS | Mod: HCNC,CPTII,S$GLB, | Performed by: INTERNAL MEDICINE

## 2020-01-23 PROCEDURE — 99499 UNLISTED E&M SERVICE: CPT | Mod: S$GLB,,, | Performed by: INTERNAL MEDICINE

## 2020-01-23 PROCEDURE — 3075F SYST BP GE 130 - 139MM HG: CPT | Mod: HCNC,CPTII,S$GLB, | Performed by: INTERNAL MEDICINE

## 2020-01-23 PROCEDURE — 99214 OFFICE O/P EST MOD 30 MIN: CPT | Mod: HCNC,S$GLB,, | Performed by: INTERNAL MEDICINE

## 2020-01-23 PROCEDURE — 1101F PR PT FALLS ASSESS DOC 0-1 FALLS W/OUT INJ PAST YR: ICD-10-PCS | Mod: HCNC,CPTII,S$GLB, | Performed by: INTERNAL MEDICINE

## 2020-01-23 RX ORDER — MELOXICAM 7.5 MG/1
7.5 TABLET ORAL DAILY
Qty: 90 TABLET | Refills: 1 | Status: SHIPPED | OUTPATIENT
Start: 2020-01-23 | End: 2020-05-28

## 2020-01-23 RX ORDER — ROSUVASTATIN CALCIUM 5 MG/1
5 TABLET, COATED ORAL DAILY
Qty: 90 TABLET | Refills: 11 | Status: SHIPPED | OUTPATIENT
Start: 2020-01-23 | End: 2021-03-18

## 2020-01-23 NOTE — PROGRESS NOTES
Subjective:       Patient ID: Ana Patel is a 69 y.o. female.    Chief Complaint: Annual Exam    Patient is here for followup for chronic conditions.    She needs another FIT test.    Interested in lipid med. Prava did cause cramps, which she did not recall. Fam has not done well with Lipitor.  The 10-year ASCVD risk score (Jose M ESCOBEDO Jr., et al., 2013) is: 20.2%    Values used to calculate the score:      Age: 69 years      Sex: Female      Is Non- : No      Diabetic: No      Tobacco smoker: Yes      Systolic Blood Pressure: 138 mmHg      Is BP treated: Yes      HDL Cholesterol: 40 mg/dL      Total Cholesterol: 209 mg/dL        Review of Systems   Constitutional: Negative for activity change, appetite change and unexpected weight change.   Eyes: Negative for visual disturbance.   Respiratory: Negative for cough, chest tightness and shortness of breath.    Cardiovascular: Negative for chest pain.   Gastrointestinal: Negative for abdominal distention and abdominal pain.   Genitourinary: Negative for difficulty urinating and urgency.   Musculoskeletal: Positive for arthralgias. Negative for joint swelling.   Skin: Positive for rash (necrobiosis bilat legs and eczema).   Neurological: Negative for tremors, syncope and weakness.       Objective:      Physical Exam   Constitutional: She is oriented to person, place, and time. She appears well-developed and well-nourished. No distress.   Diff rising to exam table 2/2 wt and jts   HENT:   Head: Normocephalic and atraumatic.   Eyes: Pupils are equal, round, and reactive to light. EOM are normal. No scleral icterus.   Neck: Normal range of motion. No thyromegaly present.   Cardiovascular: Normal rate, regular rhythm and normal heart sounds. Exam reveals no gallop and no friction rub.   No murmur heard.  Pulmonary/Chest: Effort normal and breath sounds normal. No respiratory distress. She has no wheezes. She has no rales.   Abdominal: Soft. Bowel  sounds are normal. She exhibits no distension and no mass. There is no tenderness. There is no rebound and no guarding.   Musculoskeletal: Normal range of motion. She exhibits no edema.   Valgus LLE leg.  Knee crepitus bilat.   Lymphadenopathy:     She has no cervical adenopathy.   Neurological: She is alert and oriented to person, place, and time.   Skin: She is not diaphoretic.   Psychiatric: She has a normal mood and affect. Her speech is normal and behavior is normal. Cognition and memory are normal.       Assessment:       1. Colon cancer screening    2. Morbid obesity    3. Osteoarthritis, unspecified osteoarthritis type, unspecified site    4. Polyarthritis    5. Smoker    6. Essential hypertension    7. Hyperlipidemia, unspecified hyperlipidemia type        Plan:       Ana was seen today for annual exam.    Diagnoses and all orders for this visit:    Colon cancer screening  -     Fecal Immunochemical Test (iFOBT); Future    Morbid obesity  Discussed, she will try to work more on diet    Osteoarthritis, unspecified osteoarthritis type, unspecified site  -     meloxicam (MOBIC) 7.5 MG tablet; Take 1 tablet (7.5 mg total) by mouth once daily.  Polyarthritis  -     meloxicam (MOBIC) 7.5 MG tablet; Take 1 tablet (7.5 mg total) by mouth once daily.  She will watch for stomach irritation  Currently benefits>risks    Smoker  Not ready for cessation program    Essential hypertension  Controlled    Hyperlipidemia, unspecified hyperlipidemia type  -     rosuvastatin (CRESTOR) 5 MG tablet; Take 1 tablet (5 mg total) by mouth once daily.  She will monitor for muscle cramps    Health Maintenance       Date Due Completion Date    Shingles Vaccine (2 of 3) 10/16/2015 8/21/2015    Fecal Occult Blood Test (FOBT)/FitKit 05/28/2019 5/28/2018    Influenza Vaccine (1) 09/01/2019 10/15/2018    Pneumococcal Vaccine (65+ Low/Medium Risk) (2 of 2 - PPSV23) 08/21/2020 8/24/2017    DEXA SCAN 10/09/2020 10/9/2017    Mammogram  09/03/2021 9/3/2019    Lipid Panel 07/29/2024 7/29/2019    TETANUS VACCINE 08/24/2027 8/24/2017 (Declined)    Override on 8/24/2017: Declined          Follow up in about 6 months (around 7/23/2020).    Future Appointments   Date Time Provider Department Center   7/14/2020  9:20 AM Gordo Naik MD [facility]

## 2020-05-28 DIAGNOSIS — M19.90 OSTEOARTHRITIS, UNSPECIFIED OSTEOARTHRITIS TYPE, UNSPECIFIED SITE: ICD-10-CM

## 2020-05-28 DIAGNOSIS — M13.0 POLYARTHRITIS: ICD-10-CM

## 2020-05-28 RX ORDER — MELOXICAM 7.5 MG/1
TABLET ORAL
Qty: 90 TABLET | Refills: 1 | Status: SHIPPED | OUTPATIENT
Start: 2020-05-28 | End: 2020-11-13

## 2020-06-30 ENCOUNTER — PATIENT OUTREACH (OUTPATIENT)
Dept: ADMINISTRATIVE | Facility: HOSPITAL | Age: 70
End: 2020-06-30

## 2020-06-30 NOTE — PROGRESS NOTES
Health Maintenance Due   Topic Date Due    Shingles Vaccine (2 of 3) 10/16/2015    Colorectal Cancer Screening  05/28/2019     Portal message has been sent to patient regarding overdue colonoscopy/FITKIT.  Chart review completed.

## 2020-07-07 ENCOUNTER — PES CALL (OUTPATIENT)
Dept: ADMINISTRATIVE | Facility: CLINIC | Age: 70
End: 2020-07-07

## 2020-07-14 ENCOUNTER — OFFICE VISIT (OUTPATIENT)
Dept: INTERNAL MEDICINE | Facility: CLINIC | Age: 70
End: 2020-07-14
Payer: MEDICARE

## 2020-07-14 ENCOUNTER — LAB VISIT (OUTPATIENT)
Dept: LAB | Facility: HOSPITAL | Age: 70
End: 2020-07-14
Attending: INTERNAL MEDICINE
Payer: MEDICARE

## 2020-07-14 VITALS
DIASTOLIC BLOOD PRESSURE: 82 MMHG | WEIGHT: 293 LBS | HEART RATE: 76 BPM | SYSTOLIC BLOOD PRESSURE: 134 MMHG | HEIGHT: 72 IN | BODY MASS INDEX: 39.68 KG/M2 | OXYGEN SATURATION: 97 %

## 2020-07-14 DIAGNOSIS — E78.5 HYPERLIPIDEMIA, UNSPECIFIED HYPERLIPIDEMIA TYPE: ICD-10-CM

## 2020-07-14 DIAGNOSIS — R73.09 ELEVATED GLUCOSE: ICD-10-CM

## 2020-07-14 DIAGNOSIS — I10 ESSENTIAL HYPERTENSION: ICD-10-CM

## 2020-07-14 DIAGNOSIS — Z12.11 COLON CANCER SCREENING: Primary | ICD-10-CM

## 2020-07-14 DIAGNOSIS — E66.01 MORBID OBESITY: ICD-10-CM

## 2020-07-14 DIAGNOSIS — M19.90 OSTEOARTHRITIS, UNSPECIFIED OSTEOARTHRITIS TYPE, UNSPECIFIED SITE: ICD-10-CM

## 2020-07-14 LAB
ALBUMIN SERPL BCP-MCNC: 3.9 G/DL (ref 3.5–5.2)
ALP SERPL-CCNC: 63 U/L (ref 55–135)
ALT SERPL W/O P-5'-P-CCNC: 14 U/L (ref 10–44)
ANION GAP SERPL CALC-SCNC: 9 MMOL/L (ref 8–16)
AST SERPL-CCNC: 14 U/L (ref 10–40)
BASOPHILS # BLD AUTO: 0.08 K/UL (ref 0–0.2)
BASOPHILS NFR BLD: 0.9 % (ref 0–1.9)
BILIRUB SERPL-MCNC: 0.3 MG/DL (ref 0.1–1)
BUN SERPL-MCNC: 16 MG/DL (ref 8–23)
CALCIUM SERPL-MCNC: 9.5 MG/DL (ref 8.7–10.5)
CHLORIDE SERPL-SCNC: 107 MMOL/L (ref 95–110)
CHOLEST SERPL-MCNC: 133 MG/DL (ref 120–199)
CHOLEST/HDLC SERPL: 3.2 {RATIO} (ref 2–5)
CO2 SERPL-SCNC: 27 MMOL/L (ref 23–29)
CREAT SERPL-MCNC: 0.9 MG/DL (ref 0.5–1.4)
DIFFERENTIAL METHOD: ABNORMAL
EOSINOPHIL # BLD AUTO: 0.2 K/UL (ref 0–0.5)
EOSINOPHIL NFR BLD: 2 % (ref 0–8)
ERYTHROCYTE [DISTWIDTH] IN BLOOD BY AUTOMATED COUNT: 14.6 % (ref 11.5–14.5)
EST. GFR  (AFRICAN AMERICAN): >60 ML/MIN/1.73 M^2
EST. GFR  (NON AFRICAN AMERICAN): >60 ML/MIN/1.73 M^2
ESTIMATED AVG GLUCOSE: 108 MG/DL (ref 68–131)
GLUCOSE SERPL-MCNC: 92 MG/DL (ref 70–110)
HBA1C MFR BLD HPLC: 5.4 % (ref 4–5.6)
HCT VFR BLD AUTO: 42.5 % (ref 37–48.5)
HDLC SERPL-MCNC: 41 MG/DL (ref 40–75)
HDLC SERPL: 30.8 % (ref 20–50)
HGB BLD-MCNC: 13.5 G/DL (ref 12–16)
IMM GRANULOCYTES # BLD AUTO: 0.07 K/UL (ref 0–0.04)
IMM GRANULOCYTES NFR BLD AUTO: 0.8 % (ref 0–0.5)
LDLC SERPL CALC-MCNC: 56.4 MG/DL (ref 63–159)
LYMPHOCYTES # BLD AUTO: 2.4 K/UL (ref 1–4.8)
LYMPHOCYTES NFR BLD: 26.9 % (ref 18–48)
MCH RBC QN AUTO: 31.3 PG (ref 27–31)
MCHC RBC AUTO-ENTMCNC: 31.8 G/DL (ref 32–36)
MCV RBC AUTO: 98 FL (ref 82–98)
MONOCYTES # BLD AUTO: 0.7 K/UL (ref 0.3–1)
MONOCYTES NFR BLD: 8 % (ref 4–15)
NEUTROPHILS # BLD AUTO: 5.5 K/UL (ref 1.8–7.7)
NEUTROPHILS NFR BLD: 61.4 % (ref 38–73)
NONHDLC SERPL-MCNC: 92 MG/DL
NRBC BLD-RTO: 0 /100 WBC
PLATELET # BLD AUTO: 177 K/UL (ref 150–350)
PMV BLD AUTO: 12.9 FL (ref 9.2–12.9)
POTASSIUM SERPL-SCNC: 4.6 MMOL/L (ref 3.5–5.1)
PROT SERPL-MCNC: 7.7 G/DL (ref 6–8.4)
RBC # BLD AUTO: 4.32 M/UL (ref 4–5.4)
SODIUM SERPL-SCNC: 143 MMOL/L (ref 136–145)
TRIGL SERPL-MCNC: 178 MG/DL (ref 30–150)
WBC # BLD AUTO: 8.91 K/UL (ref 3.9–12.7)

## 2020-07-14 PROCEDURE — 3079F DIAST BP 80-89 MM HG: CPT | Mod: HCNC,CPTII,S$GLB, | Performed by: INTERNAL MEDICINE

## 2020-07-14 PROCEDURE — 80053 COMPREHEN METABOLIC PANEL: CPT | Mod: HCNC

## 2020-07-14 PROCEDURE — 3008F PR BODY MASS INDEX (BMI) DOCUMENTED: ICD-10-PCS | Mod: HCNC,CPTII,S$GLB, | Performed by: INTERNAL MEDICINE

## 2020-07-14 PROCEDURE — 1101F PT FALLS ASSESS-DOCD LE1/YR: CPT | Mod: HCNC,CPTII,S$GLB, | Performed by: INTERNAL MEDICINE

## 2020-07-14 PROCEDURE — 85025 COMPLETE CBC W/AUTO DIFF WBC: CPT | Mod: HCNC

## 2020-07-14 PROCEDURE — 99999 PR PBB SHADOW E&M-EST. PATIENT-LVL IV: CPT | Mod: PBBFAC,HCNC,, | Performed by: INTERNAL MEDICINE

## 2020-07-14 PROCEDURE — 1101F PR PT FALLS ASSESS DOC 0-1 FALLS W/OUT INJ PAST YR: ICD-10-PCS | Mod: HCNC,CPTII,S$GLB, | Performed by: INTERNAL MEDICINE

## 2020-07-14 PROCEDURE — 99499 UNLISTED E&M SERVICE: CPT | Mod: HCNC,S$GLB,, | Performed by: INTERNAL MEDICINE

## 2020-07-14 PROCEDURE — 3079F PR MOST RECENT DIASTOLIC BLOOD PRESSURE 80-89 MM HG: ICD-10-PCS | Mod: HCNC,CPTII,S$GLB, | Performed by: INTERNAL MEDICINE

## 2020-07-14 PROCEDURE — 3075F PR MOST RECENT SYSTOLIC BLOOD PRESS GE 130-139MM HG: ICD-10-PCS | Mod: HCNC,CPTII,S$GLB, | Performed by: INTERNAL MEDICINE

## 2020-07-14 PROCEDURE — 1126F AMNT PAIN NOTED NONE PRSNT: CPT | Mod: HCNC,S$GLB,, | Performed by: INTERNAL MEDICINE

## 2020-07-14 PROCEDURE — 99999 PR PBB SHADOW E&M-EST. PATIENT-LVL IV: ICD-10-PCS | Mod: PBBFAC,HCNC,, | Performed by: INTERNAL MEDICINE

## 2020-07-14 PROCEDURE — 1126F PR PAIN SEVERITY QUANTIFIED, NO PAIN PRESENT: ICD-10-PCS | Mod: HCNC,S$GLB,, | Performed by: INTERNAL MEDICINE

## 2020-07-14 PROCEDURE — 3008F BODY MASS INDEX DOCD: CPT | Mod: HCNC,CPTII,S$GLB, | Performed by: INTERNAL MEDICINE

## 2020-07-14 PROCEDURE — 3075F SYST BP GE 130 - 139MM HG: CPT | Mod: HCNC,CPTII,S$GLB, | Performed by: INTERNAL MEDICINE

## 2020-07-14 PROCEDURE — 36415 COLL VENOUS BLD VENIPUNCTURE: CPT | Mod: HCNC

## 2020-07-14 PROCEDURE — 80061 LIPID PANEL: CPT | Mod: HCNC

## 2020-07-14 PROCEDURE — 99214 OFFICE O/P EST MOD 30 MIN: CPT | Mod: HCNC,S$GLB,, | Performed by: INTERNAL MEDICINE

## 2020-07-14 PROCEDURE — 99499 RISK ADDL DX/OHS AUDIT: ICD-10-PCS | Mod: HCNC,S$GLB,, | Performed by: INTERNAL MEDICINE

## 2020-07-14 PROCEDURE — 83036 HEMOGLOBIN GLYCOSYLATED A1C: CPT | Mod: HCNC

## 2020-07-14 PROCEDURE — 1159F MED LIST DOCD IN RCRD: CPT | Mod: HCNC,S$GLB,, | Performed by: INTERNAL MEDICINE

## 2020-07-14 PROCEDURE — 1159F PR MEDICATION LIST DOCUMENTED IN MEDICAL RECORD: ICD-10-PCS | Mod: HCNC,S$GLB,, | Performed by: INTERNAL MEDICINE

## 2020-07-14 PROCEDURE — 99214 PR OFFICE/OUTPT VISIT, EST, LEVL IV, 30-39 MIN: ICD-10-PCS | Mod: HCNC,S$GLB,, | Performed by: INTERNAL MEDICINE

## 2020-07-14 RX ORDER — AMLODIPINE BESYLATE 5 MG/1
TABLET ORAL
Qty: 90 TABLET | Refills: 11 | Status: SHIPPED | OUTPATIENT
Start: 2020-07-14 | End: 2021-03-27 | Stop reason: SDUPTHER

## 2020-07-14 RX ORDER — FUROSEMIDE 20 MG/1
TABLET ORAL
Qty: 90 TABLET | Refills: 11 | Status: SHIPPED | OUTPATIENT
Start: 2020-07-14 | End: 2021-03-27 | Stop reason: SDUPTHER

## 2020-07-14 NOTE — PROGRESS NOTES
Subjective:       Patient ID: Ana Patel is a 70 y.o. female.    Chief Complaint: Follow-up    Patient is here for followup for chronic conditions.    She would like to drink protein shakes.    No new other issues with her health.    Chronic knee and hip pains, especially L knee.    Review of Systems   Constitutional: Negative for activity change, appetite change and unexpected weight change.   Eyes: Negative for visual disturbance.   Respiratory: Negative for cough, chest tightness and shortness of breath.    Cardiovascular: Negative for chest pain.   Gastrointestinal: Negative for abdominal distention and abdominal pain.   Genitourinary: Negative for difficulty urinating and urgency.   Musculoskeletal: Positive for arthralgias. Negative for joint swelling.   Skin: Positive for rash (necrobiosis bilat legs and eczema).   Neurological: Negative for tremors, syncope and weakness.           Objective:      Physical Exam  Constitutional:       General: She is not in acute distress.     Appearance: She is well-developed. She is not diaphoretic.      Comments: Diff rising to exam table 2/2 wt and jts   HENT:      Head: Normocephalic and atraumatic.   Eyes:      General: No scleral icterus.     Pupils: Pupils are equal, round, and reactive to light.   Neck:      Musculoskeletal: Normal range of motion.      Thyroid: No thyromegaly.   Cardiovascular:      Rate and Rhythm: Normal rate and regular rhythm.      Heart sounds: Normal heart sounds. No murmur. No friction rub. No gallop.    Pulmonary:      Effort: Pulmonary effort is normal. No respiratory distress.      Breath sounds: Normal breath sounds. No wheezing or rales.   Abdominal:      General: Bowel sounds are normal. There is no distension.      Palpations: Abdomen is soft. There is no mass.      Tenderness: There is no abdominal tenderness. There is no guarding or rebound.   Musculoskeletal: Normal range of motion.      Comments: Valgus LLE leg.  Knee  crepitus bilat.   Lymphadenopathy:      Cervical: No cervical adenopathy.   Neurological:      Mental Status: She is alert and oriented to person, place, and time.   Psychiatric:         Speech: Speech normal.         Behavior: Behavior normal.         Assessment:       1. Colon cancer screening    2. Morbid obesity    3. Osteoarthritis, unspecified osteoarthritis type, unspecified site    4. Essential hypertension    5. Hyperlipidemia, unspecified hyperlipidemia type    6. Elevated glucose        Plan:       Ana was seen today for follow-up.    Diagnoses and all orders for this visit:    Colon cancer screening  -     Fecal Immunochemical Test (iFOBT); Future    Morbid obesity  She would like to see if protein shakes can help. She declines WLS ref    Osteoarthritis, unspecified osteoarthritis type, unspecified site  Has been tolerating lower dose mobic    Essential hypertension  -     amLODIPine (NORVASC) 5 MG tablet; TAKE 1 TABLET ONE TIME DAILY  -     furosemide (LASIX) 20 MG tablet; TAKE 1 TABLET ONE TIME DAILY  controlled    Hyperlipidemia, unspecified hyperlipidemia type  -     CBC auto differential; Future  -     Comprehensive metabolic panel; Future  -     Lipid Panel; Future  Has been tolerating statin    Elevated glucose  -     Hemoglobin A1C; Future    Still smoking not ready to quit    Health Maintenance       Date Due Completion Date    Shingles Vaccine (2 of 3) 10/16/2015 8/21/2015    Colorectal Cancer Screening 05/28/2019 5/28/2018    Influenza Vaccine (1) 09/01/2020 10/4/2019    Pneumococcal Vaccine (65+ Low/Medium Risk) (2 of 2 - PPSV23) 10/04/2020 10/4/2019    DEXA SCAN 10/09/2020 10/9/2017    Mammogram 09/03/2021 9/3/2019    Lipid Panel 07/29/2024 7/29/2019    TETANUS VACCINE 08/24/2027 8/24/2017 (Declined)    Override on 8/24/2017: Declined          Follow up in about 6 months (around 1/14/2021).    Future Appointments   Date Time Provider Department Center   7/14/2020 10:20 AM [facility]   7/23/2020 10:00 AM Caridad Diaz NP [facility]   1/12/2021  9:00 AM Gordo Naik MD [facility] IM [facility]

## 2020-09-17 ENCOUNTER — PATIENT MESSAGE (OUTPATIENT)
Dept: ADMINISTRATIVE | Facility: HOSPITAL | Age: 70
End: 2020-09-17

## 2020-09-29 ENCOUNTER — PATIENT MESSAGE (OUTPATIENT)
Dept: OTHER | Facility: OTHER | Age: 70
End: 2020-09-29

## 2020-10-05 ENCOUNTER — PATIENT MESSAGE (OUTPATIENT)
Dept: ADMINISTRATIVE | Facility: HOSPITAL | Age: 70
End: 2020-10-05

## 2020-11-12 DIAGNOSIS — M13.0 POLYARTHRITIS: ICD-10-CM

## 2020-11-12 DIAGNOSIS — M19.90 OSTEOARTHRITIS, UNSPECIFIED OSTEOARTHRITIS TYPE, UNSPECIFIED SITE: ICD-10-CM

## 2020-11-13 RX ORDER — MELOXICAM 7.5 MG/1
TABLET ORAL
Qty: 90 TABLET | Refills: 1 | Status: SHIPPED | OUTPATIENT
Start: 2020-11-13 | End: 2021-03-27 | Stop reason: SDUPTHER

## 2020-11-23 ENCOUNTER — PATIENT OUTREACH (OUTPATIENT)
Dept: ADMINISTRATIVE | Facility: OTHER | Age: 70
End: 2020-11-23

## 2020-11-23 NOTE — PROGRESS NOTES
LINKS immunization registry not responding  Health Maintenance updated  Chart reviewed for overdue Proactive [facility] Encounters (JOSS) health maintenance testing (CRS, Breast Ca, Diabetic Eye Exam)   Orders entered:N/A

## 2020-11-25 ENCOUNTER — OFFICE VISIT (OUTPATIENT)
Dept: PODIATRY | Facility: CLINIC | Age: 70
End: 2020-11-25
Payer: MEDICARE

## 2020-11-25 VITALS
SYSTOLIC BLOOD PRESSURE: 152 MMHG | HEIGHT: 72 IN | DIASTOLIC BLOOD PRESSURE: 75 MMHG | BODY MASS INDEX: 39.68 KG/M2 | WEIGHT: 293 LBS | HEART RATE: 68 BPM

## 2020-11-25 DIAGNOSIS — B35.1 ONYCHOMYCOSIS DUE TO DERMATOPHYTE: Primary | ICD-10-CM

## 2020-11-25 PROCEDURE — 99999 PR PBB SHADOW E&M-EST. PATIENT-LVL III: ICD-10-PCS | Mod: PBBFAC,HCNC,, | Performed by: PODIATRIST

## 2020-11-25 PROCEDURE — 3078F PR MOST RECENT DIASTOLIC BLOOD PRESSURE < 80 MM HG: ICD-10-PCS | Mod: HCNC,CPTII,S$GLB, | Performed by: PODIATRIST

## 2020-11-25 PROCEDURE — 1159F MED LIST DOCD IN RCRD: CPT | Mod: HCNC,S$GLB,, | Performed by: PODIATRIST

## 2020-11-25 PROCEDURE — 99203 OFFICE O/P NEW LOW 30 MIN: CPT | Mod: HCNC,S$GLB,, | Performed by: PODIATRIST

## 2020-11-25 PROCEDURE — 3077F SYST BP >= 140 MM HG: CPT | Mod: HCNC,CPTII,S$GLB, | Performed by: PODIATRIST

## 2020-11-25 PROCEDURE — 3077F PR MOST RECENT SYSTOLIC BLOOD PRESSURE >= 140 MM HG: ICD-10-PCS | Mod: HCNC,CPTII,S$GLB, | Performed by: PODIATRIST

## 2020-11-25 PROCEDURE — 3078F DIAST BP <80 MM HG: CPT | Mod: HCNC,CPTII,S$GLB, | Performed by: PODIATRIST

## 2020-11-25 PROCEDURE — 99203 PR OFFICE/OUTPT VISIT, NEW, LEVL III, 30-44 MIN: ICD-10-PCS | Mod: HCNC,S$GLB,, | Performed by: PODIATRIST

## 2020-11-25 PROCEDURE — 1126F AMNT PAIN NOTED NONE PRSNT: CPT | Mod: HCNC,S$GLB,, | Performed by: PODIATRIST

## 2020-11-25 PROCEDURE — 1126F PR PAIN SEVERITY QUANTIFIED, NO PAIN PRESENT: ICD-10-PCS | Mod: HCNC,S$GLB,, | Performed by: PODIATRIST

## 2020-11-25 PROCEDURE — 3008F BODY MASS INDEX DOCD: CPT | Mod: HCNC,CPTII,S$GLB, | Performed by: PODIATRIST

## 2020-11-25 PROCEDURE — 1159F PR MEDICATION LIST DOCUMENTED IN MEDICAL RECORD: ICD-10-PCS | Mod: HCNC,S$GLB,, | Performed by: PODIATRIST

## 2020-11-25 PROCEDURE — 3008F PR BODY MASS INDEX (BMI) DOCUMENTED: ICD-10-PCS | Mod: HCNC,CPTII,S$GLB, | Performed by: PODIATRIST

## 2020-11-25 PROCEDURE — 99999 PR PBB SHADOW E&M-EST. PATIENT-LVL III: CPT | Mod: PBBFAC,HCNC,, | Performed by: PODIATRIST

## 2020-12-11 ENCOUNTER — PATIENT MESSAGE (OUTPATIENT)
Dept: OTHER | Facility: OTHER | Age: 70
End: 2020-12-11

## 2020-12-29 ENCOUNTER — PATIENT MESSAGE (OUTPATIENT)
Dept: ADMINISTRATIVE | Facility: HOSPITAL | Age: 70
End: 2020-12-29

## 2020-12-29 ENCOUNTER — PATIENT OUTREACH (OUTPATIENT)
Dept: ADMINISTRATIVE | Facility: HOSPITAL | Age: 70
End: 2020-12-29

## 2020-12-29 DIAGNOSIS — Z13.820 ENCOUNTER FOR OSTEOPOROSIS SCREENING IN ASYMPTOMATIC POSTMENOPAUSAL PATIENT: Primary | ICD-10-CM

## 2020-12-29 DIAGNOSIS — Z78.0 ENCOUNTER FOR OSTEOPOROSIS SCREENING IN ASYMPTOMATIC POSTMENOPAUSAL PATIENT: Primary | ICD-10-CM

## 2020-12-29 NOTE — PROGRESS NOTES
Health Maintenance Due   Topic Date Due    Colorectal Cancer Screening  05/28/2019    DEXA SCAN  10/09/2020    Shingles Vaccine (3 of 3) 11/20/2020     Order has been placed for DEXA scan.  Portal message has been sent to patient regarding overdue health maintenance.  Chart review completed.

## 2021-02-01 ENCOUNTER — OFFICE VISIT (OUTPATIENT)
Dept: INTERNAL MEDICINE | Facility: CLINIC | Age: 71
End: 2021-02-01
Payer: MEDICARE

## 2021-02-01 VITALS
WEIGHT: 282.44 LBS | HEART RATE: 79 BPM | DIASTOLIC BLOOD PRESSURE: 70 MMHG | OXYGEN SATURATION: 97 % | BODY MASS INDEX: 38.25 KG/M2 | SYSTOLIC BLOOD PRESSURE: 135 MMHG | HEIGHT: 72 IN

## 2021-02-01 DIAGNOSIS — Z12.11 COLON CANCER SCREENING: Primary | ICD-10-CM

## 2021-02-01 DIAGNOSIS — E66.01 MORBID OBESITY: ICD-10-CM

## 2021-02-01 DIAGNOSIS — L30.9 ECZEMA, UNSPECIFIED TYPE: ICD-10-CM

## 2021-02-01 PROCEDURE — 3078F DIAST BP <80 MM HG: CPT | Mod: HCNC,CPTII,S$GLB, | Performed by: INTERNAL MEDICINE

## 2021-02-01 PROCEDURE — 1159F PR MEDICATION LIST DOCUMENTED IN MEDICAL RECORD: ICD-10-PCS | Mod: HCNC,S$GLB,, | Performed by: INTERNAL MEDICINE

## 2021-02-01 PROCEDURE — 3008F BODY MASS INDEX DOCD: CPT | Mod: HCNC,CPTII,S$GLB, | Performed by: INTERNAL MEDICINE

## 2021-02-01 PROCEDURE — 3075F SYST BP GE 130 - 139MM HG: CPT | Mod: HCNC,CPTII,S$GLB, | Performed by: INTERNAL MEDICINE

## 2021-02-01 PROCEDURE — 3288F PR FALLS RISK ASSESSMENT DOCUMENTED: ICD-10-PCS | Mod: HCNC,CPTII,S$GLB, | Performed by: INTERNAL MEDICINE

## 2021-02-01 PROCEDURE — 99214 OFFICE O/P EST MOD 30 MIN: CPT | Mod: HCNC,S$GLB,, | Performed by: INTERNAL MEDICINE

## 2021-02-01 PROCEDURE — 3075F PR MOST RECENT SYSTOLIC BLOOD PRESS GE 130-139MM HG: ICD-10-PCS | Mod: HCNC,CPTII,S$GLB, | Performed by: INTERNAL MEDICINE

## 2021-02-01 PROCEDURE — 1125F AMNT PAIN NOTED PAIN PRSNT: CPT | Mod: HCNC,S$GLB,, | Performed by: INTERNAL MEDICINE

## 2021-02-01 PROCEDURE — 1101F PT FALLS ASSESS-DOCD LE1/YR: CPT | Mod: HCNC,CPTII,S$GLB, | Performed by: INTERNAL MEDICINE

## 2021-02-01 PROCEDURE — 99999 PR PBB SHADOW E&M-EST. PATIENT-LVL IV: CPT | Mod: PBBFAC,HCNC,, | Performed by: INTERNAL MEDICINE

## 2021-02-01 PROCEDURE — 99999 PR PBB SHADOW E&M-EST. PATIENT-LVL IV: ICD-10-PCS | Mod: PBBFAC,HCNC,, | Performed by: INTERNAL MEDICINE

## 2021-02-01 PROCEDURE — 1101F PR PT FALLS ASSESS DOC 0-1 FALLS W/OUT INJ PAST YR: ICD-10-PCS | Mod: HCNC,CPTII,S$GLB, | Performed by: INTERNAL MEDICINE

## 2021-02-01 PROCEDURE — 3008F PR BODY MASS INDEX (BMI) DOCUMENTED: ICD-10-PCS | Mod: HCNC,CPTII,S$GLB, | Performed by: INTERNAL MEDICINE

## 2021-02-01 PROCEDURE — 1125F PR PAIN SEVERITY QUANTIFIED, PAIN PRESENT: ICD-10-PCS | Mod: HCNC,S$GLB,, | Performed by: INTERNAL MEDICINE

## 2021-02-01 PROCEDURE — 3288F FALL RISK ASSESSMENT DOCD: CPT | Mod: HCNC,CPTII,S$GLB, | Performed by: INTERNAL MEDICINE

## 2021-02-01 PROCEDURE — 99214 PR OFFICE/OUTPT VISIT, EST, LEVL IV, 30-39 MIN: ICD-10-PCS | Mod: HCNC,S$GLB,, | Performed by: INTERNAL MEDICINE

## 2021-02-01 PROCEDURE — 1159F MED LIST DOCD IN RCRD: CPT | Mod: HCNC,S$GLB,, | Performed by: INTERNAL MEDICINE

## 2021-02-01 PROCEDURE — 3078F PR MOST RECENT DIASTOLIC BLOOD PRESSURE < 80 MM HG: ICD-10-PCS | Mod: HCNC,CPTII,S$GLB, | Performed by: INTERNAL MEDICINE

## 2021-02-01 RX ORDER — TRIAMCINOLONE ACETONIDE 5 MG/G
CREAM TOPICAL 2 TIMES DAILY
Qty: 454 G | Refills: 0 | Status: SHIPPED | OUTPATIENT
Start: 2021-02-01 | End: 2023-11-22

## 2021-02-12 ENCOUNTER — PES CALL (OUTPATIENT)
Dept: ADMINISTRATIVE | Facility: CLINIC | Age: 71
End: 2021-02-12

## 2021-02-27 ENCOUNTER — IMMUNIZATION (OUTPATIENT)
Dept: PHARMACY | Facility: CLINIC | Age: 71
End: 2021-02-27
Payer: MEDICARE

## 2021-02-27 DIAGNOSIS — Z23 NEED FOR VACCINATION: Primary | ICD-10-CM

## 2021-03-04 ENCOUNTER — PATIENT OUTREACH (OUTPATIENT)
Dept: ADMINISTRATIVE | Facility: HOSPITAL | Age: 71
End: 2021-03-04

## 2021-03-04 ENCOUNTER — PATIENT MESSAGE (OUTPATIENT)
Dept: ADMINISTRATIVE | Facility: HOSPITAL | Age: 71
End: 2021-03-04

## 2021-03-04 DIAGNOSIS — Z12.31 ENCOUNTER FOR SCREENING MAMMOGRAM FOR BREAST CANCER: Primary | ICD-10-CM

## 2021-03-27 ENCOUNTER — IMMUNIZATION (OUTPATIENT)
Dept: PHARMACY | Facility: CLINIC | Age: 71
End: 2021-03-27
Payer: MEDICARE

## 2021-03-27 DIAGNOSIS — Z23 NEED FOR VACCINATION: Primary | ICD-10-CM

## 2021-04-05 ENCOUNTER — PATIENT MESSAGE (OUTPATIENT)
Dept: ADMINISTRATIVE | Facility: HOSPITAL | Age: 71
End: 2021-04-05

## 2021-04-12 ENCOUNTER — PES CALL (OUTPATIENT)
Dept: ADMINISTRATIVE | Facility: CLINIC | Age: 71
End: 2021-04-12

## 2021-05-10 ENCOUNTER — TELEPHONE (OUTPATIENT)
Dept: ADMINISTRATIVE | Facility: CLINIC | Age: 71
End: 2021-05-10

## 2021-08-09 ENCOUNTER — TELEPHONE (OUTPATIENT)
Dept: INTERNAL MEDICINE | Facility: CLINIC | Age: 71
End: 2021-08-09

## 2021-08-09 ENCOUNTER — LAB VISIT (OUTPATIENT)
Dept: INTERNAL MEDICINE | Facility: CLINIC | Age: 71
End: 2021-08-09
Payer: MEDICARE

## 2021-08-09 DIAGNOSIS — Z20.822 CLOSE EXPOSURE TO COVID-19 VIRUS: Primary | ICD-10-CM

## 2021-08-09 DIAGNOSIS — Z20.822 CLOSE EXPOSURE TO COVID-19 VIRUS: ICD-10-CM

## 2021-08-09 PROCEDURE — U0005 INFEC AGEN DETEC AMPLI PROBE: HCPCS | Performed by: INTERNAL MEDICINE

## 2021-08-09 PROCEDURE — U0003 INFECTIOUS AGENT DETECTION BY NUCLEIC ACID (DNA OR RNA); SEVERE ACUTE RESPIRATORY SYNDROME CORONAVIRUS 2 (SARS-COV-2) (CORONAVIRUS DISEASE [COVID-19]), AMPLIFIED PROBE TECHNIQUE, MAKING USE OF HIGH THROUGHPUT TECHNOLOGIES AS DESCRIBED BY CMS-2020-01-R: HCPCS | Performed by: INTERNAL MEDICINE

## 2021-08-10 LAB
SARS-COV-2 RNA RESP QL NAA+PROBE: NOT DETECTED
SARS-COV-2- CYCLE NUMBER: -1

## 2021-08-17 ENCOUNTER — PATIENT OUTREACH (OUTPATIENT)
Dept: ADMINISTRATIVE | Facility: HOSPITAL | Age: 71
End: 2021-08-17

## 2021-08-17 ENCOUNTER — PATIENT MESSAGE (OUTPATIENT)
Dept: ADMINISTRATIVE | Facility: HOSPITAL | Age: 71
End: 2021-08-17

## 2021-09-09 ENCOUNTER — TELEPHONE (OUTPATIENT)
Dept: INTERNAL MEDICINE | Facility: CLINIC | Age: 71
End: 2021-09-09

## 2021-09-17 ENCOUNTER — PES CALL (OUTPATIENT)
Dept: ADMINISTRATIVE | Facility: CLINIC | Age: 71
End: 2021-09-17

## 2021-10-04 ENCOUNTER — PATIENT MESSAGE (OUTPATIENT)
Dept: ADMINISTRATIVE | Facility: HOSPITAL | Age: 71
End: 2021-10-04

## 2021-11-10 ENCOUNTER — OFFICE VISIT (OUTPATIENT)
Dept: INTERNAL MEDICINE | Facility: CLINIC | Age: 71
End: 2021-11-10
Payer: MEDICARE

## 2021-11-10 ENCOUNTER — LAB VISIT (OUTPATIENT)
Dept: LAB | Facility: HOSPITAL | Age: 71
End: 2021-11-10
Attending: INTERNAL MEDICINE
Payer: MEDICARE

## 2021-11-10 VITALS
BODY MASS INDEX: 39.68 KG/M2 | SYSTOLIC BLOOD PRESSURE: 138 MMHG | OXYGEN SATURATION: 95 % | HEIGHT: 72 IN | DIASTOLIC BLOOD PRESSURE: 78 MMHG | HEART RATE: 80 BPM | WEIGHT: 293 LBS

## 2021-11-10 DIAGNOSIS — R73.09 ELEVATED GLUCOSE: ICD-10-CM

## 2021-11-10 DIAGNOSIS — E78.5 HYPERLIPIDEMIA, UNSPECIFIED HYPERLIPIDEMIA TYPE: ICD-10-CM

## 2021-11-10 DIAGNOSIS — I10 ESSENTIAL HYPERTENSION: Primary | ICD-10-CM

## 2021-11-10 DIAGNOSIS — M19.90 OSTEOARTHRITIS, UNSPECIFIED OSTEOARTHRITIS TYPE, UNSPECIFIED SITE: ICD-10-CM

## 2021-11-10 LAB
ALBUMIN SERPL BCP-MCNC: 3.6 G/DL (ref 3.5–5.2)
ALP SERPL-CCNC: 61 U/L (ref 55–135)
ALT SERPL W/O P-5'-P-CCNC: 17 U/L (ref 10–44)
ANION GAP SERPL CALC-SCNC: 11 MMOL/L (ref 8–16)
AST SERPL-CCNC: 19 U/L (ref 10–40)
BASOPHILS # BLD AUTO: 0.05 K/UL (ref 0–0.2)
BASOPHILS NFR BLD: 0.7 % (ref 0–1.9)
BILIRUB SERPL-MCNC: 0.4 MG/DL (ref 0.1–1)
BUN SERPL-MCNC: 14 MG/DL (ref 8–23)
CALCIUM SERPL-MCNC: 10.3 MG/DL (ref 8.7–10.5)
CHLORIDE SERPL-SCNC: 102 MMOL/L (ref 95–110)
CHOLEST SERPL-MCNC: 195 MG/DL (ref 120–199)
CHOLEST/HDLC SERPL: 5.3 {RATIO} (ref 2–5)
CO2 SERPL-SCNC: 27 MMOL/L (ref 23–29)
CREAT SERPL-MCNC: 0.9 MG/DL (ref 0.5–1.4)
DIFFERENTIAL METHOD: ABNORMAL
EOSINOPHIL # BLD AUTO: 0.1 K/UL (ref 0–0.5)
EOSINOPHIL NFR BLD: 1.2 % (ref 0–8)
ERYTHROCYTE [DISTWIDTH] IN BLOOD BY AUTOMATED COUNT: 15.5 % (ref 11.5–14.5)
EST. GFR  (AFRICAN AMERICAN): >60 ML/MIN/1.73 M^2
EST. GFR  (NON AFRICAN AMERICAN): >60 ML/MIN/1.73 M^2
ESTIMATED AVG GLUCOSE: 111 MG/DL (ref 68–131)
GLUCOSE SERPL-MCNC: 82 MG/DL (ref 70–110)
HBA1C MFR BLD: 5.5 % (ref 4–5.6)
HCT VFR BLD AUTO: 41.8 % (ref 37–48.5)
HDLC SERPL-MCNC: 37 MG/DL (ref 40–75)
HDLC SERPL: 19 % (ref 20–50)
HGB BLD-MCNC: 12.8 G/DL (ref 12–16)
IMM GRANULOCYTES # BLD AUTO: 0.07 K/UL (ref 0–0.04)
IMM GRANULOCYTES NFR BLD AUTO: 1 % (ref 0–0.5)
LDLC SERPL CALC-MCNC: 114.2 MG/DL (ref 63–159)
LYMPHOCYTES # BLD AUTO: 2.3 K/UL (ref 1–4.8)
LYMPHOCYTES NFR BLD: 31.1 % (ref 18–48)
MCH RBC QN AUTO: 31.8 PG (ref 27–31)
MCHC RBC AUTO-ENTMCNC: 30.6 G/DL (ref 32–36)
MCV RBC AUTO: 104 FL (ref 82–98)
MONOCYTES # BLD AUTO: 1.2 K/UL (ref 0.3–1)
MONOCYTES NFR BLD: 16 % (ref 4–15)
NEUTROPHILS # BLD AUTO: 3.7 K/UL (ref 1.8–7.7)
NEUTROPHILS NFR BLD: 50 % (ref 38–73)
NONHDLC SERPL-MCNC: 158 MG/DL
NRBC BLD-RTO: 0 /100 WBC
PLATELET # BLD AUTO: 155 K/UL (ref 150–450)
PMV BLD AUTO: 11.8 FL (ref 9.2–12.9)
POTASSIUM SERPL-SCNC: 4.8 MMOL/L (ref 3.5–5.1)
PROT SERPL-MCNC: 7.8 G/DL (ref 6–8.4)
RBC # BLD AUTO: 4.03 M/UL (ref 4–5.4)
SODIUM SERPL-SCNC: 140 MMOL/L (ref 136–145)
TRIGL SERPL-MCNC: 219 MG/DL (ref 30–150)
WBC # BLD AUTO: 7.31 K/UL (ref 3.9–12.7)

## 2021-11-10 PROCEDURE — 3288F FALL RISK ASSESSMENT DOCD: CPT | Mod: CPTII,S$GLB,, | Performed by: INTERNAL MEDICINE

## 2021-11-10 PROCEDURE — 1125F AMNT PAIN NOTED PAIN PRSNT: CPT | Mod: CPTII,S$GLB,, | Performed by: INTERNAL MEDICINE

## 2021-11-10 PROCEDURE — 99214 OFFICE O/P EST MOD 30 MIN: CPT | Mod: S$GLB,,, | Performed by: INTERNAL MEDICINE

## 2021-11-10 PROCEDURE — 80061 LIPID PANEL: CPT | Performed by: INTERNAL MEDICINE

## 2021-11-10 PROCEDURE — 36415 COLL VENOUS BLD VENIPUNCTURE: CPT | Performed by: INTERNAL MEDICINE

## 2021-11-10 PROCEDURE — 1160F RVW MEDS BY RX/DR IN RCRD: CPT | Mod: CPTII,S$GLB,, | Performed by: INTERNAL MEDICINE

## 2021-11-10 PROCEDURE — 3044F PR MOST RECENT HEMOGLOBIN A1C LEVEL <7.0%: ICD-10-PCS | Mod: CPTII,S$GLB,, | Performed by: INTERNAL MEDICINE

## 2021-11-10 PROCEDURE — 99214 PR OFFICE/OUTPT VISIT, EST, LEVL IV, 30-39 MIN: ICD-10-PCS | Mod: S$GLB,,, | Performed by: INTERNAL MEDICINE

## 2021-11-10 PROCEDURE — 4010F ACE/ARB THERAPY RXD/TAKEN: CPT | Mod: CPTII,S$GLB,, | Performed by: INTERNAL MEDICINE

## 2021-11-10 PROCEDURE — 3288F PR FALLS RISK ASSESSMENT DOCUMENTED: ICD-10-PCS | Mod: CPTII,S$GLB,, | Performed by: INTERNAL MEDICINE

## 2021-11-10 PROCEDURE — 80053 COMPREHEN METABOLIC PANEL: CPT | Performed by: INTERNAL MEDICINE

## 2021-11-10 PROCEDURE — 1101F PT FALLS ASSESS-DOCD LE1/YR: CPT | Mod: CPTII,S$GLB,, | Performed by: INTERNAL MEDICINE

## 2021-11-10 PROCEDURE — 4010F PR ACE/ARB THEARPY RXD/TAKEN: ICD-10-PCS | Mod: CPTII,S$GLB,, | Performed by: INTERNAL MEDICINE

## 2021-11-10 PROCEDURE — 3078F PR MOST RECENT DIASTOLIC BLOOD PRESSURE < 80 MM HG: ICD-10-PCS | Mod: CPTII,S$GLB,, | Performed by: INTERNAL MEDICINE

## 2021-11-10 PROCEDURE — 83036 HEMOGLOBIN GLYCOSYLATED A1C: CPT | Performed by: INTERNAL MEDICINE

## 2021-11-10 PROCEDURE — 3008F PR BODY MASS INDEX (BMI) DOCUMENTED: ICD-10-PCS | Mod: CPTII,S$GLB,, | Performed by: INTERNAL MEDICINE

## 2021-11-10 PROCEDURE — 3075F PR MOST RECENT SYSTOLIC BLOOD PRESS GE 130-139MM HG: ICD-10-PCS | Mod: CPTII,S$GLB,, | Performed by: INTERNAL MEDICINE

## 2021-11-10 PROCEDURE — 3044F HG A1C LEVEL LT 7.0%: CPT | Mod: CPTII,S$GLB,, | Performed by: INTERNAL MEDICINE

## 2021-11-10 PROCEDURE — 3078F DIAST BP <80 MM HG: CPT | Mod: CPTII,S$GLB,, | Performed by: INTERNAL MEDICINE

## 2021-11-10 PROCEDURE — 85025 COMPLETE CBC W/AUTO DIFF WBC: CPT | Performed by: INTERNAL MEDICINE

## 2021-11-10 PROCEDURE — 3075F SYST BP GE 130 - 139MM HG: CPT | Mod: CPTII,S$GLB,, | Performed by: INTERNAL MEDICINE

## 2021-11-10 PROCEDURE — 1160F PR REVIEW ALL MEDS BY PRESCRIBER/CLIN PHARMACIST DOCUMENTED: ICD-10-PCS | Mod: CPTII,S$GLB,, | Performed by: INTERNAL MEDICINE

## 2021-11-10 PROCEDURE — 1159F MED LIST DOCD IN RCRD: CPT | Mod: CPTII,S$GLB,, | Performed by: INTERNAL MEDICINE

## 2021-11-10 PROCEDURE — 99999 PR PBB SHADOW E&M-EST. PATIENT-LVL IV: CPT | Mod: PBBFAC,,, | Performed by: INTERNAL MEDICINE

## 2021-11-10 PROCEDURE — 1125F PR PAIN SEVERITY QUANTIFIED, PAIN PRESENT: ICD-10-PCS | Mod: CPTII,S$GLB,, | Performed by: INTERNAL MEDICINE

## 2021-11-10 PROCEDURE — 99999 PR PBB SHADOW E&M-EST. PATIENT-LVL IV: ICD-10-PCS | Mod: PBBFAC,,, | Performed by: INTERNAL MEDICINE

## 2021-11-10 PROCEDURE — 3008F BODY MASS INDEX DOCD: CPT | Mod: CPTII,S$GLB,, | Performed by: INTERNAL MEDICINE

## 2021-11-10 PROCEDURE — 1101F PR PT FALLS ASSESS DOC 0-1 FALLS W/OUT INJ PAST YR: ICD-10-PCS | Mod: CPTII,S$GLB,, | Performed by: INTERNAL MEDICINE

## 2021-11-10 PROCEDURE — 1159F PR MEDICATION LIST DOCUMENTED IN MEDICAL RECORD: ICD-10-PCS | Mod: CPTII,S$GLB,, | Performed by: INTERNAL MEDICINE

## 2021-11-10 RX ORDER — TRAMADOL HYDROCHLORIDE 50 MG/1
50 TABLET ORAL DAILY PRN
Qty: 30 TABLET | Refills: 3 | Status: SHIPPED | OUTPATIENT
Start: 2021-11-10 | End: 2021-12-08 | Stop reason: SDUPTHER

## 2021-11-10 RX ORDER — EZETIMIBE 10 MG/1
10 TABLET ORAL DAILY
Qty: 90 TABLET | Refills: 3 | Status: SHIPPED | OUTPATIENT
Start: 2021-11-10 | End: 2022-01-23 | Stop reason: SDUPTHER

## 2021-11-12 ENCOUNTER — PATIENT OUTREACH (OUTPATIENT)
Dept: ADMINISTRATIVE | Facility: HOSPITAL | Age: 71
End: 2021-11-12
Payer: MEDICARE

## 2021-11-15 ENCOUNTER — IMMUNIZATION (OUTPATIENT)
Dept: PHARMACY | Facility: CLINIC | Age: 71
End: 2021-11-15
Payer: MEDICARE

## 2021-11-15 DIAGNOSIS — Z23 NEED FOR VACCINATION: Primary | ICD-10-CM

## 2021-12-03 ENCOUNTER — PATIENT MESSAGE (OUTPATIENT)
Dept: INTERNAL MEDICINE | Facility: CLINIC | Age: 71
End: 2021-12-03
Payer: MEDICARE

## 2021-12-06 ENCOUNTER — PATIENT MESSAGE (OUTPATIENT)
Dept: INTERNAL MEDICINE | Facility: CLINIC | Age: 71
End: 2021-12-06
Payer: MEDICARE

## 2021-12-06 DIAGNOSIS — R21 RASH: Primary | ICD-10-CM

## 2021-12-06 DIAGNOSIS — M19.90 OSTEOARTHRITIS, UNSPECIFIED OSTEOARTHRITIS TYPE, UNSPECIFIED SITE: ICD-10-CM

## 2021-12-08 RX ORDER — TRAMADOL HYDROCHLORIDE 50 MG/1
50 TABLET ORAL EVERY 12 HOURS PRN
Qty: 40 TABLET | Refills: 3 | Status: SHIPPED | OUTPATIENT
Start: 2021-12-08 | End: 2021-12-23

## 2021-12-10 ENCOUNTER — OFFICE VISIT (OUTPATIENT)
Dept: DERMATOLOGY | Facility: CLINIC | Age: 71
End: 2021-12-10
Payer: MEDICARE

## 2021-12-10 DIAGNOSIS — R21 RASH: ICD-10-CM

## 2021-12-10 DIAGNOSIS — E11.620 NECROBIOSIS LIPOIDICA DIABETICORUM: Primary | ICD-10-CM

## 2021-12-10 DIAGNOSIS — I87.2 STASIS DERMATITIS OF BOTH LEGS: ICD-10-CM

## 2021-12-10 PROCEDURE — 4010F PR ACE/ARB THEARPY RXD/TAKEN: ICD-10-PCS | Mod: HCNC,CPTII,S$GLB, | Performed by: DERMATOLOGY

## 2021-12-10 PROCEDURE — 4010F ACE/ARB THERAPY RXD/TAKEN: CPT | Mod: HCNC,CPTII,S$GLB, | Performed by: DERMATOLOGY

## 2021-12-10 PROCEDURE — 99204 OFFICE O/P NEW MOD 45 MIN: CPT | Mod: HCNC,S$GLB,, | Performed by: DERMATOLOGY

## 2021-12-10 PROCEDURE — 99204 PR OFFICE/OUTPT VISIT, NEW, LEVL IV, 45-59 MIN: ICD-10-PCS | Mod: HCNC,S$GLB,, | Performed by: DERMATOLOGY

## 2021-12-10 PROCEDURE — 99999 PR PBB SHADOW E&M-EST. PATIENT-LVL II: CPT | Mod: PBBFAC,HCNC,, | Performed by: DERMATOLOGY

## 2021-12-10 PROCEDURE — 99999 PR PBB SHADOW E&M-EST. PATIENT-LVL II: ICD-10-PCS | Mod: PBBFAC,HCNC,, | Performed by: DERMATOLOGY

## 2021-12-10 RX ORDER — BETAMETHASONE DIPROPIONATE 0.5 MG/G
CREAM TOPICAL
Qty: 45 G | Refills: 1 | Status: SHIPPED | OUTPATIENT
Start: 2021-12-10 | End: 2023-11-03

## 2021-12-10 RX ORDER — BETAMETHASONE VALERATE 1.2 MG/G
OINTMENT TOPICAL 2 TIMES DAILY
Qty: 45 G | Refills: 2 | Status: CANCELLED | OUTPATIENT
Start: 2021-12-10

## 2021-12-12 ENCOUNTER — PATIENT MESSAGE (OUTPATIENT)
Dept: DERMATOLOGY | Facility: CLINIC | Age: 71
End: 2021-12-12
Payer: MEDICARE

## 2021-12-12 DIAGNOSIS — M79.3 LIPODERMATOSCLEROSIS OF BOTH LOWER EXTREMITIES: Primary | ICD-10-CM

## 2021-12-13 RX ORDER — PENTOXIFYLLINE 400 MG/1
TABLET, EXTENDED RELEASE ORAL
Qty: 90 TABLET | Refills: 2 | Status: SHIPPED | OUTPATIENT
Start: 2021-12-13 | End: 2023-11-03

## 2021-12-14 ENCOUNTER — PATIENT MESSAGE (OUTPATIENT)
Dept: DERMATOLOGY | Facility: CLINIC | Age: 71
End: 2021-12-14
Payer: MEDICARE

## 2021-12-18 ENCOUNTER — PATIENT MESSAGE (OUTPATIENT)
Dept: INTERNAL MEDICINE | Facility: CLINIC | Age: 71
End: 2021-12-18
Payer: MEDICARE

## 2021-12-18 ENCOUNTER — PATIENT MESSAGE (OUTPATIENT)
Dept: DERMATOLOGY | Facility: CLINIC | Age: 71
End: 2021-12-18
Payer: MEDICARE

## 2021-12-21 ENCOUNTER — PATIENT OUTREACH (OUTPATIENT)
Dept: ADMINISTRATIVE | Facility: OTHER | Age: 71
End: 2021-12-21
Payer: MEDICARE

## 2021-12-22 ENCOUNTER — HOSPITAL ENCOUNTER (EMERGENCY)
Facility: HOSPITAL | Age: 71
Discharge: HOME | End: 2021-12-22
Attending: EMERGENCY MEDICINE
Payer: MEDICARE

## 2021-12-22 ENCOUNTER — PATIENT MESSAGE (OUTPATIENT)
Dept: INTERNAL MEDICINE | Facility: CLINIC | Age: 71
End: 2021-12-22
Payer: MEDICARE

## 2021-12-22 ENCOUNTER — OFFICE VISIT (OUTPATIENT)
Dept: DERMATOLOGY | Facility: CLINIC | Age: 71
End: 2021-12-22
Payer: MEDICARE

## 2021-12-22 VITALS
SYSTOLIC BLOOD PRESSURE: 143 MMHG | DIASTOLIC BLOOD PRESSURE: 73 MMHG | RESPIRATION RATE: 18 BRPM | TEMPERATURE: 99 F | OXYGEN SATURATION: 100 % | HEIGHT: 72 IN | HEART RATE: 81 BPM | BODY MASS INDEX: 37.93 KG/M2 | WEIGHT: 280 LBS

## 2021-12-22 DIAGNOSIS — M79.3 LIPODERMATOSCLEROSIS OF BOTH LOWER EXTREMITIES: Primary | ICD-10-CM

## 2021-12-22 DIAGNOSIS — L98.491 SKIN ULCER, LIMITED TO BREAKDOWN OF SKIN: ICD-10-CM

## 2021-12-22 DIAGNOSIS — M79.89 LEG SWELLING: ICD-10-CM

## 2021-12-22 DIAGNOSIS — R52 PAIN: ICD-10-CM

## 2021-12-22 DIAGNOSIS — M79.604 RIGHT LEG PAIN: Primary | ICD-10-CM

## 2021-12-22 DIAGNOSIS — L92.1 NECROBIOSIS LIPOIDICA: Primary | ICD-10-CM

## 2021-12-22 DIAGNOSIS — E11.620 NLD (NECROBIOSIS LIPOIDICA DIABETICORUM): ICD-10-CM

## 2021-12-22 DIAGNOSIS — L97.911 SKIN ULCER OF RIGHT LOWER LEG, LIMITED TO BREAKDOWN OF SKIN: ICD-10-CM

## 2021-12-22 PROCEDURE — 99999 PR PBB SHADOW E&M-EST. PATIENT-LVL III: ICD-10-PCS | Mod: PBBFAC,HCNC,, | Performed by: DERMATOLOGY

## 2021-12-22 PROCEDURE — 99999 PR PBB SHADOW E&M-EST. PATIENT-LVL III: CPT | Mod: PBBFAC,HCNC,, | Performed by: DERMATOLOGY

## 2021-12-22 PROCEDURE — 1101F PT FALLS ASSESS-DOCD LE1/YR: CPT | Mod: HCNC,CPTII,S$GLB, | Performed by: DERMATOLOGY

## 2021-12-22 PROCEDURE — 25000003 PHARM REV CODE 250: Mod: HCNC | Performed by: EMERGENCY MEDICINE

## 2021-12-22 PROCEDURE — 99214 OFFICE O/P EST MOD 30 MIN: CPT | Mod: HCNC,S$GLB,, | Performed by: DERMATOLOGY

## 2021-12-22 PROCEDURE — 99499 UNLISTED E&M SERVICE: CPT | Mod: S$GLB,,, | Performed by: DERMATOLOGY

## 2021-12-22 PROCEDURE — 4010F PR ACE/ARB THEARPY RXD/TAKEN: ICD-10-PCS | Mod: HCNC,CPTII,S$GLB, | Performed by: DERMATOLOGY

## 2021-12-22 PROCEDURE — 3288F PR FALLS RISK ASSESSMENT DOCUMENTED: ICD-10-PCS | Mod: HCNC,CPTII,S$GLB, | Performed by: DERMATOLOGY

## 2021-12-22 PROCEDURE — 99284 PR EMERGENCY DEPT VISIT,LEVEL IV: ICD-10-PCS | Mod: HCNC,,, | Performed by: EMERGENCY MEDICINE

## 2021-12-22 PROCEDURE — 87070 CULTURE OTHR SPECIMN AEROBIC: CPT | Mod: HCNC | Performed by: DERMATOLOGY

## 2021-12-22 PROCEDURE — 1125F AMNT PAIN NOTED PAIN PRSNT: CPT | Mod: HCNC,CPTII,S$GLB, | Performed by: DERMATOLOGY

## 2021-12-22 PROCEDURE — 3044F HG A1C LEVEL LT 7.0%: CPT | Mod: HCNC,CPTII,S$GLB, | Performed by: DERMATOLOGY

## 2021-12-22 PROCEDURE — 99499 RISK ADDL DX/OHS AUDIT: ICD-10-PCS | Mod: S$GLB,,, | Performed by: DERMATOLOGY

## 2021-12-22 PROCEDURE — 1125F PR PAIN SEVERITY QUANTIFIED, PAIN PRESENT: ICD-10-PCS | Mod: HCNC,CPTII,S$GLB, | Performed by: DERMATOLOGY

## 2021-12-22 PROCEDURE — 1101F PR PT FALLS ASSESS DOC 0-1 FALLS W/OUT INJ PAST YR: ICD-10-PCS | Mod: HCNC,CPTII,S$GLB, | Performed by: DERMATOLOGY

## 2021-12-22 PROCEDURE — 99283 EMERGENCY DEPT VISIT LOW MDM: CPT | Mod: HCNC

## 2021-12-22 PROCEDURE — 3288F FALL RISK ASSESSMENT DOCD: CPT | Mod: HCNC,CPTII,S$GLB, | Performed by: DERMATOLOGY

## 2021-12-22 PROCEDURE — 99284 EMERGENCY DEPT VISIT MOD MDM: CPT | Mod: HCNC,,, | Performed by: EMERGENCY MEDICINE

## 2021-12-22 PROCEDURE — 99214 PR OFFICE/OUTPT VISIT, EST, LEVL IV, 30-39 MIN: ICD-10-PCS | Mod: HCNC,S$GLB,, | Performed by: DERMATOLOGY

## 2021-12-22 PROCEDURE — 87075 CULTR BACTERIA EXCEPT BLOOD: CPT | Mod: HCNC | Performed by: DERMATOLOGY

## 2021-12-22 PROCEDURE — 4010F ACE/ARB THERAPY RXD/TAKEN: CPT | Mod: HCNC,CPTII,S$GLB, | Performed by: DERMATOLOGY

## 2021-12-22 PROCEDURE — 3044F PR MOST RECENT HEMOGLOBIN A1C LEVEL <7.0%: ICD-10-PCS | Mod: HCNC,CPTII,S$GLB, | Performed by: DERMATOLOGY

## 2021-12-22 RX ORDER — COLLAGENASE SANTYL 250 [ARB'U]/G
OINTMENT TOPICAL
Qty: 50 G | Refills: 1 | Status: SHIPPED | OUTPATIENT
Start: 2021-12-22 | End: 2023-11-03

## 2021-12-22 RX ORDER — HYDROCODONE BITARTRATE AND ACETAMINOPHEN 5; 325 MG/1; MG/1
1 TABLET ORAL
Status: COMPLETED | OUTPATIENT
Start: 2021-12-22 | End: 2021-12-22

## 2021-12-22 RX ORDER — DOXYCYCLINE 100 MG/1
100 CAPSULE ORAL 2 TIMES DAILY
Qty: 30 CAPSULE | Refills: 3 | Status: ON HOLD | OUTPATIENT
Start: 2021-12-22 | End: 2022-03-08

## 2021-12-22 RX ORDER — HYDROCODONE BITARTRATE AND ACETAMINOPHEN 5; 325 MG/1; MG/1
1 TABLET ORAL EVERY 8 HOURS PRN
Qty: 9 TABLET | Refills: 0 | Status: SHIPPED | OUTPATIENT
Start: 2021-12-22 | End: 2021-12-23

## 2021-12-22 RX ORDER — TRIAMCINOLONE ACETONIDE 1 MG/G
OINTMENT TOPICAL 2 TIMES DAILY PRN
Qty: 454 G | Refills: 3 | Status: SHIPPED | OUTPATIENT
Start: 2021-12-22 | End: 2023-02-27 | Stop reason: SDUPTHER

## 2021-12-22 RX ADMIN — HYDROCODONE BITARTRATE AND ACETAMINOPHEN 1 TABLET: 5; 325 TABLET ORAL at 05:12

## 2021-12-22 NOTE — DISCHARGE INSTRUCTIONS
Please return here or go to the Emergency Department for any concerns or worsening of condition.  If you were prescribed antibiotics, please take them to completion.  If you were prescribed a narcotic medication, do not drive or operate heavy equipment or machinery while taking these medications.  Please follow up with your primary care doctor or specialist as needed.  If you  smoke, please stop smoking.    PLEASE KEEP YOUR APPOINTMENT FOR YOUR OUTPATIENT ULTRASOUND

## 2021-12-22 NOTE — ED NOTES
Two patient identifiers have been checked and are correct.      Pt sent to Ed from derm office to rule out clot to right leg. Pt reports pain to kourtney legs, right worse than left with swelling and ulcers to right leg. PT has hx eczema and necrobiosis lipoidica.     Appearance: Pt awake, alert & oriented to person, place & time. Pt in no acute distress at present time. Pt is clean and well groomed with clothes appropriately fastened.   Skin: Skin warm, dry & intact. Color consistent with ethnicity. Mucous membranes moist. No breakdown or brusing noted.   Musculoskeletal: Patient assisted from wheelchair to bed. States uses cane to ambulate.   Respiratory: Respirations spontaneous, even, and non-labored. Visible chest rise noted. Airway is open and patent. No accessory muscle use noted.   Neurologic: Sensation is intact. Speech is clear and appropriate. Eyes open spontaneously, behavior appropriate to situation, follows commands, facial expression symmetrical, bilateral hand grasp equal and even, purposeful motor response noted.  Cardiac: All peripheral pulses present. No Bilateral lower extremity edema. Cap refill is <3 seconds.  Abdomen: Abdomen soft, non-tender to palpation.   : Pt reports no dysuria or hematuria.

## 2021-12-22 NOTE — ED PROVIDER NOTES
Encounter Date: 12/22/2021    SCRIBE #1 NOTE: I, Breezy Pollard, am scribing for, and in the presence of,  Rustam Santana MD. I have scribed the following portions of the note - Other sections scribed: HPI, ROS, PE.       History     Chief Complaint   Patient presents with    Leg Pain     R lower leg pain, sent from derm to r/o clot, has ulcers on eczema     Time patient was seen by the provider: 5:27 PM      The patient is a 71 y.o. female with co-morbidities including: HTN, HLD, eczema, necrobiosis lipoidica, osteoarthritis, and varicose vein of leg as well as a surgical history of adenoidectomy who presents to the ED with a complaint of right lower leg pain that began two weeks ago. She states the her right leg initially had just one scab on it. She states she treated it with cream, and the scab fell off. She states spots began appearing on her right leg after the scab fell off, and the right leg pain has worsened. She states her right leg pain causes her to have difficulty sleeping. She states she saw her dermatologist earlier today, and they sent her here to get an ultrasound to rule out a possible clot. She states Dr. Vinson found ulcers on eczema. She acknowledges her left leg is slightly more swollen than her right, but states no pain is in her left leg. She requests the spots on her lower right leg be wrapped. Denies chest pain, shortness of breath, pain in the back of her right calf, or any previous blood clots.     The history is provided by the patient, medical records and the spouse. No  was used.     Review of patient's allergies indicates:   Allergen Reactions    Sulfa (sulfonamide antibiotics) Hives    Pravastatin Other (See Comments)     Muscle cramps    Ciprofloxacin Rash    Pcn [penicillins] Rash     Past Medical History:   Diagnosis Date    Eczema     Hyperlipidemia     Hypertension     Necrobiosis lipoidica     Obesity     Osteoarthritis     Varicose vein of  leg      Past Surgical History:   Procedure Laterality Date    TONSILLECTOMY, ADENOIDECTOMY       Family History   Adopted: Yes   Problem Relation Age of Onset    No Known Problems Daughter     No Known Problems Son      Social History     Tobacco Use    Smoking status: Current Every Day Smoker     Packs/day: 0.50     Years: 30.00     Pack years: 15.00     Types: Cigarettes    Smokeless tobacco: Never Used   Substance Use Topics    Alcohol use: No     Alcohol/week: 0.0 standard drinks     Types: 1 Standard drinks or equivalent per week     Comment: maybe 1-2 yearly    Drug use: No     Review of Systems  General: No fever.  No chills.  Eyes: No visual changes.  Head: No headache.    Integument: No rashes or lesions.  Chest: No shortness of breath.  Cardiovascular: No chest pain. No SOB.   + bilateral leg swelling but not new  Abdomen: No abdominal pain.  No nausea or vomiting.   Urinary: No abnormal urination.  Neurologic: No focal weakness.  No numbness.  Hematologic: No easy bruising.  Endocrine: No excessive thirst or urination.  Musculoskeletal: + right lower leg pain. No pain in right calf  Psychiatric: +difficulty sleeping due to right leg pain  Physical Exam     Initial Vitals [12/22/21 1709]   BP Pulse Resp Temp SpO2   (!) 143/73 81 18 98.5 °F (36.9 °C) 100 %      MAP       --         Physical Exam   Appearance: No acute distress.  Skin: No rashes seen.  Good turgor.  No abrasions.  No ecchymoses.  Eyes: No conjunctival injection.  ENT: Oropharynx clear.    Chest: Clear to auscultation bilaterally.  Good air movement.  No wheezes.  No rhonchi.  Cardiovascular: Regular rate and rhythm.  No murmurs. No gallops. No rubs.  Abdomen: Soft.  Not distended.  Nontender.  No guarding.  No rebound.  Musculoskeletal: Lower extremities bilaterally swollen and erythemus interior; left calf more swollen than the right calf; several ulcerations to right anterior to the region; no crepetis or discharge; Good range of  motion all joints.  No deformities.  Neck supple.  No meningismus.  Neurologic: Motor intact.  Sensation intact.  Cerebellar intact.  Cranial nerves intact.  Mental Status:  Alert and oriented x 3.  Appropriate, conversant.    ED Course   Procedures  Labs Reviewed - No data to display       Imaging Results    None          Medications   HYDROcodone-acetaminophen 5-325 mg per tablet 1 tablet (1 tablet Oral Given 12/22/21 3426)     Medical Decision Making:   History:   Old Medical Records: I decided to obtain old medical records.  Differential Diagnosis:   Patient arrived to the emergency department given ultrasound after being seen by Dermatology today.  Please there was some confusion from the patient regards the need to come to the ER for an emergent ultrasound verses the outpatient ultrasound that was placed by the dermatologist.  I explained this to the patient, and offered to do an ultrasound here in the emergency department did explain that that would take some time due to help visit we were and asked if they would rather come back as an outpatient and schedule it.  The patient  both agreed that relate to come back as an outpatient when scheduled.  I do think whether is a risk of there being a DVT, I do think it is lower to the to the chronicity of this and lack of previous thrombosis.  With that knowledge I do think it is safe to do the ultrasound as an outpatient, and do believe the ultrasound is indicated, but is not necessarily indicated as an emergent procedure.  Patient was discharged in stable condition, and was given stronger pain medicine here and stronger pain medicine to go home with while she begins her therapy for her skin ulcers of her right lower leg.          Scribe Attestation:   Scribe #1: I performed the above scribed service and the documentation accurately describes the services I performed. I attest to the accuracy of the note.                 Clinical Impression:   Final  diagnoses:  [M79.604] Right leg pain (Primary)  [M79.89] Leg swelling  [L97.911] Skin ulcer of right lower leg, limited to breakdown of skin          ED Disposition Condition    Discharge Stable        ED Prescriptions     Medication Sig Dispense Start Date End Date Auth. Provider    HYDROcodone-acetaminophen (NORCO) 5-325 mg per tablet Take 1 tablet by mouth every 8 (eight) hours as needed for Pain. 9 tablet 12/22/2021  Rustam Santana III, MD        Follow-up Information    None          Rustam Santana III, MD  12/23/21 0029

## 2021-12-23 ENCOUNTER — PATIENT MESSAGE (OUTPATIENT)
Dept: INTERNAL MEDICINE | Facility: CLINIC | Age: 71
End: 2021-12-23
Payer: MEDICARE

## 2021-12-23 ENCOUNTER — HOSPITAL ENCOUNTER (OUTPATIENT)
Dept: CARDIOLOGY | Facility: HOSPITAL | Age: 71
Discharge: HOME | End: 2021-12-23
Attending: DERMATOLOGY
Payer: MEDICARE

## 2021-12-23 ENCOUNTER — TELEPHONE (OUTPATIENT)
Dept: WOUND CARE | Facility: CLINIC | Age: 71
End: 2021-12-23
Payer: MEDICARE

## 2021-12-23 DIAGNOSIS — M79.3 LIPODERMATOSCLEROSIS OF BOTH LOWER EXTREMITIES: ICD-10-CM

## 2021-12-23 DIAGNOSIS — L92.1 NECROBIOSIS LIPOIDICA: ICD-10-CM

## 2021-12-23 LAB
LEFT ABI: 1.08
LEFT ARM BP: 162 MMHG
LEFT DORSALIS PEDIS: 175 MMHG
LEFT GREAT SAPHENOUS DISTAL THIGH DIA: 0.45 CM
LEFT GREAT SAPHENOUS JUNCTION DIA: 0.77 CM
LEFT GREAT SAPHENOUS KNEE DIA: 0.45 CM
LEFT GREAT SAPHENOUS MIDDLE THIGH DIA: 0.57 CM
LEFT POSTERIOR TIBIAL: 154 MMHG
LEFT SMALL SAPHENOUS KNEE DIA: 0.7 CM
LEFT SMALL SAPHENOUS SPJ DIA: 0.6 CM
RIGHT ABI: 0.56
RIGHT ARM BP: 159 MMHG
RIGHT DORSALIS PEDIS: 76 MMHG
RIGHT GREAT SAPHENOUS DISTAL THIGH DIA: 0.55 CM
RIGHT GREAT SAPHENOUS JUNCTION DIA: 0.8 CM
RIGHT GREAT SAPHENOUS KNEE DIA: 0.43 CM
RIGHT GREAT SAPHENOUS MIDDLE THIGH DIA: 0.56 CM
RIGHT GREAT SAPHENOUS PROXIMAL CALF DIA: 0.4 CM
RIGHT POSTERIOR TIBIAL: 91 MMHG
RIGHT SMALL SAPHENOUS KNEE DIA: 0.54 CM
RIGHT SMALL SAPHENOUS SPJ DIA: 0.44 CM

## 2021-12-23 PROCEDURE — 93922 UPR/L XTREMITY ART 2 LEVELS: CPT | Mod: 26,HCNC,, | Performed by: INTERNAL MEDICINE

## 2021-12-23 PROCEDURE — 93922 UPR/L XTREMITY ART 2 LEVELS: CPT | Mod: HCNC

## 2021-12-23 PROCEDURE — 93970 EXTREMITY STUDY: CPT | Mod: TC,HCNC

## 2021-12-23 PROCEDURE — 93922 ANKLE BRACHIAL INDICES (ABI): ICD-10-PCS | Mod: 26,HCNC,, | Performed by: INTERNAL MEDICINE

## 2021-12-23 PROCEDURE — 93970 EXTREMITY STUDY: CPT | Mod: 26,HCNC,, | Performed by: INTERNAL MEDICINE

## 2021-12-23 PROCEDURE — 93970 CV US LOWER VENOUS INSUFFICIENCY BILATERAL (CUPID ONLY): ICD-10-PCS | Mod: 26,HCNC,, | Performed by: INTERNAL MEDICINE

## 2021-12-23 RX ORDER — HYDROCODONE BITARTRATE AND ACETAMINOPHEN 5; 325 MG/1; MG/1
1 TABLET ORAL EVERY 12 HOURS PRN
Qty: 40 TABLET | Refills: 0 | Status: SHIPPED | OUTPATIENT
Start: 2021-12-23 | End: 2021-12-27 | Stop reason: SDUPTHER

## 2021-12-27 ENCOUNTER — PATIENT MESSAGE (OUTPATIENT)
Dept: INTERNAL MEDICINE | Facility: CLINIC | Age: 71
End: 2021-12-27
Payer: MEDICARE

## 2021-12-27 LAB — BACTERIA SPEC AEROBE CULT: NO GROWTH

## 2021-12-27 RX ORDER — HYDROCODONE BITARTRATE AND ACETAMINOPHEN 5; 325 MG/1; MG/1
1 TABLET ORAL EVERY 12 HOURS PRN
Qty: 40 TABLET | Refills: 0 | Status: SHIPPED | OUTPATIENT
Start: 2021-12-27 | End: 2022-01-13

## 2021-12-30 ENCOUNTER — TELEPHONE (OUTPATIENT)
Dept: VASCULAR SURGERY | Facility: CLINIC | Age: 71
End: 2021-12-30
Payer: MEDICARE

## 2021-12-30 LAB — BACTERIA SPEC ANAEROBE CULT: NORMAL

## 2022-01-04 ENCOUNTER — OFFICE VISIT (OUTPATIENT)
Dept: WOUND CARE | Facility: CLINIC | Age: 72
End: 2022-01-04
Payer: MEDICARE

## 2022-01-04 VITALS
HEIGHT: 72 IN | WEIGHT: 293 LBS | DIASTOLIC BLOOD PRESSURE: 67 MMHG | BODY MASS INDEX: 39.68 KG/M2 | SYSTOLIC BLOOD PRESSURE: 147 MMHG | HEART RATE: 68 BPM

## 2022-01-04 DIAGNOSIS — L98.491 SKIN ULCER, LIMITED TO BREAKDOWN OF SKIN: ICD-10-CM

## 2022-01-04 PROCEDURE — 1101F PT FALLS ASSESS-DOCD LE1/YR: CPT | Mod: HCNC,CPTII,S$GLB, | Performed by: SURGERY

## 2022-01-04 PROCEDURE — 99999 PR PBB SHADOW E&M-EST. PATIENT-LVL V: CPT | Mod: PBBFAC,HCNC,, | Performed by: SURGERY

## 2022-01-04 PROCEDURE — 3078F PR MOST RECENT DIASTOLIC BLOOD PRESSURE < 80 MM HG: ICD-10-PCS | Mod: HCNC,CPTII,S$GLB, | Performed by: SURGERY

## 2022-01-04 PROCEDURE — 3008F PR BODY MASS INDEX (BMI) DOCUMENTED: ICD-10-PCS | Mod: HCNC,CPTII,S$GLB, | Performed by: SURGERY

## 2022-01-04 PROCEDURE — 99999 PR PBB SHADOW E&M-EST. PATIENT-LVL V: ICD-10-PCS | Mod: PBBFAC,HCNC,, | Performed by: SURGERY

## 2022-01-04 PROCEDURE — 3077F SYST BP >= 140 MM HG: CPT | Mod: HCNC,CPTII,S$GLB, | Performed by: SURGERY

## 2022-01-04 PROCEDURE — 1125F AMNT PAIN NOTED PAIN PRSNT: CPT | Mod: HCNC,CPTII,S$GLB, | Performed by: SURGERY

## 2022-01-04 PROCEDURE — 3077F PR MOST RECENT SYSTOLIC BLOOD PRESSURE >= 140 MM HG: ICD-10-PCS | Mod: HCNC,CPTII,S$GLB, | Performed by: SURGERY

## 2022-01-04 PROCEDURE — 4010F ACE/ARB THERAPY RXD/TAKEN: CPT | Mod: HCNC,CPTII,S$GLB, | Performed by: SURGERY

## 2022-01-04 PROCEDURE — 3288F PR FALLS RISK ASSESSMENT DOCUMENTED: ICD-10-PCS | Mod: HCNC,CPTII,S$GLB, | Performed by: SURGERY

## 2022-01-04 PROCEDURE — 1125F PR PAIN SEVERITY QUANTIFIED, PAIN PRESENT: ICD-10-PCS | Mod: HCNC,CPTII,S$GLB, | Performed by: SURGERY

## 2022-01-04 PROCEDURE — 3288F FALL RISK ASSESSMENT DOCD: CPT | Mod: HCNC,CPTII,S$GLB, | Performed by: SURGERY

## 2022-01-04 PROCEDURE — 3078F DIAST BP <80 MM HG: CPT | Mod: HCNC,CPTII,S$GLB, | Performed by: SURGERY

## 2022-01-04 PROCEDURE — 4010F PR ACE/ARB THEARPY RXD/TAKEN: ICD-10-PCS | Mod: HCNC,CPTII,S$GLB, | Performed by: SURGERY

## 2022-01-04 PROCEDURE — 99211 PR OFFICE/OUTPT VISIT, EST, LEVL I: ICD-10-PCS | Mod: HCNC,S$GLB,, | Performed by: SURGERY

## 2022-01-04 PROCEDURE — 1101F PR PT FALLS ASSESS DOC 0-1 FALLS W/OUT INJ PAST YR: ICD-10-PCS | Mod: HCNC,CPTII,S$GLB, | Performed by: SURGERY

## 2022-01-04 PROCEDURE — 99211 OFF/OP EST MAY X REQ PHY/QHP: CPT | Mod: HCNC,S$GLB,, | Performed by: SURGERY

## 2022-01-04 PROCEDURE — 3008F BODY MASS INDEX DOCD: CPT | Mod: HCNC,CPTII,S$GLB, | Performed by: SURGERY

## 2022-01-04 NOTE — PROGRESS NOTES
Subjective:       Patient ID: Ana Patel is a 71 y.o. female.    Chief Complaint: Wound Check (Right lower leg multiple wounds - evaluation and treatment )    HPI  Review of Systems    Objective:      Physical Exam    Assessment:       1. Skin ulcer, limited to breakdown of skin           Wound Right anterior;lower;proximal Leg (Active)        Pre-existing:    Primary Wound Type:    Side: Right   Orientation: anterior;lower;proximal   Location: Leg   Wound Number:    Ankle-Brachial Index:    Pulses:    Removal Indication and Assessment:    Wound Outcome:    (Retired) Wound Type:    (Retired) Wound Length (cm):    (Retired) Wound Width (cm):    (Retired) Depth (cm):    Wound Description (Comments):    Removal Indications:    Wound Image   01/04/22 1644   Dressing Appearance Dry;Intact;Clean 01/04/22 1644   Drainage Amount Small 01/04/22 1644   Drainage Characteristics/Odor Yellow;Tan 01/04/22 1644   Yellow (%), Wound Tissue Color 100 % 01/04/22 1644   Periwound Area Intact;Dry;Swelling 01/04/22 1644   Wound Length (cm) 1 cm 01/04/22 1644   Wound Width (cm) 1.8 cm 01/04/22 1644   Wound Surface Area (cm^2) 1.8 cm^2 01/04/22 1644   Care Cleansed with:;Wound cleanser 01/04/22 1644   Dressing Applied;Compression wrap;Other (comment) 01/04/22 1644   Periwound Care Moisture barrier applied 01/04/22 1644   Compression Unna's Boot;Three layer compression;Other (see comments) 01/04/22 1644   Dressing Change Due 01/07/22 01/04/22 1644            Wound Right anterior;distal;lower Leg (Active)        Pre-existing:    Primary Wound Type:    Side: Right   Orientation: anterior;distal;lower   Location: Leg   Wound Number:    Ankle-Brachial Index:    Pulses:    Removal Indication and Assessment:    Wound Outcome:    (Retired) Wound Type:    (Retired) Wound Length (cm):    (Retired) Wound Width (cm):    (Retired) Depth (cm):    Wound Description (Comments):    Removal Indications:    Wound Image   01/04/22 1644   Dressing  Appearance Dry;Intact;Clean 01/04/22 1644   Drainage Amount Moderate 01/04/22 1644   Drainage Characteristics/Odor Yellow;Tan 01/04/22 1644   Yellow (%), Wound Tissue Color 100 % 01/04/22 1644   Periwound Area Intact;Dry;Swelling 01/04/22 1644   Wound Length (cm) 1.4 cm 01/04/22 1644   Wound Width (cm) 1.7 cm 01/04/22 1644   Wound Surface Area (cm^2) 2.38 cm^2 01/04/22 1644   Care Cleansed with:;Wound cleanser 01/04/22 1644   Dressing Applied;Compression wrap;Other (comment) 01/04/22 1644   Periwound Care Moisture barrier applied 01/04/22 1644   Compression Unna's Boot;Other (see comments);Three layer compression 01/04/22 1644   Dressing Change Due 01/07/22 01/04/22 1644            Wound Right anterior;lower Leg (Active)        Pre-existing:    Primary Wound Type:    Side: Right   Orientation: anterior;lower   Location: Leg   Wound Number:    Ankle-Brachial Index:    Pulses:    Removal Indication and Assessment:    Wound Outcome:    (Retired) Wound Type:    (Retired) Wound Length (cm):    (Retired) Wound Width (cm):    (Retired) Depth (cm):    Wound Description (Comments):    Removal Indications:    Wound Image   01/04/22 1644   Dressing Appearance Dry;Intact;Clean 01/04/22 1644   Drainage Amount Small 01/04/22 1644   Drainage Characteristics/Odor [place];Yellow 01/04/22 1644   Yellow (%), Wound Tissue Color 100 % 01/04/22 1644   Periwound Area Intact;Dry;Swelling 01/04/22 1644   Wound Length (cm) 1.1 cm 01/04/22 1644   Wound Width (cm) 1 cm 01/04/22 1644   Wound Surface Area (cm^2) 1.1 cm^2 01/04/22 1644   Care Cleansed with:;Wound cleanser 01/04/22 1644   Dressing Applied;Compression wrap;Other (comment) 01/04/22 1644   Periwound Care Moisture barrier applied 01/04/22 1644   Compression Unna's Boot;Three layer compression;Other (see comments) 01/04/22 1644   Dressing Change Due 01/07/22 01/04/22 1644            Wound Right lower;lateral Leg (Active)        Pre-existing:    Primary Wound Type:    Side: Right    Orientation: lower;lateral   Location: Leg   Wound Number:    Ankle-Brachial Index:    Pulses:    Removal Indication and Assessment:    Wound Outcome:    (Retired) Wound Type:    (Retired) Wound Length (cm):    (Retired) Wound Width (cm):    (Retired) Depth (cm):    Wound Description (Comments):    Removal Indications:    Wound Image   01/04/22 1644   Dressing Appearance Dry;Intact;Clean 01/04/22 1644   Drainage Amount Small 01/04/22 1644   Drainage Characteristics/Odor [place];Yellow 01/04/22 1644   Yellow (%), Wound Tissue Color 100 % 01/04/22 1644   Periwound Area Intact;Dry;Swelling 01/04/22 1644   Wound Length (cm) 1.3 cm 01/04/22 1644   Wound Width (cm) 1.8 cm 01/04/22 1644   Wound Surface Area (cm^2) 2.34 cm^2 01/04/22 1644   Care Cleansed with:;Wound cleanser 01/04/22 1644   Dressing Applied;Compression wrap;Other (comment) 01/04/22 1644   Compression Unna's Boot;Three layer compression;Other (see comments) 01/04/22 1644   Dressing Change Due 01/07/22 01/04/22 1644     Ana was seen in clinic room placed in sitting position with legs elevated.  Dressing removed and right leg wounds cleaned with wound cleanser.  Topical Lidocaine Hydrochloride 4% applied over wounds - wounds debrided; procedure tolerated well, silver nitrate applied to right anterior shin wound.  Right foot positioned at a 90 degree angle with an ABD pad to the ankle to prevent slippage.  Calamine oxide wrap, followed by kerlix roll gauze and coban cohesive outer layer was applied using a spiral technique avoiding creases or folds.  Each layer started behind the first metatarsal and ended below the tibial tubercle of the knee.  There was overlap of each turn half the width of the previous turn.  The unna boot will be change in three days.         Plan:       Calamine unna boot right lower leg as detailed above  Patient instructed to elevate legs when sitting for prolonged periods of time  Patient was warn not to get dressing wet and to use a  cast cover for showering.  Should dressing become wet, patient is to remove by unrolling each layer from the top going down; shower with a mild soap, pat dry and place a wet-to-dry dressing over the wound cover with gauze, roll gauze and secure with paper tape.  Apply two ace wraps for compression and to secure bandages.  Patient to notify this office immediately to have a new dressing applied.  Wound care instructions given to patient  Return Friday 01/07/2022 at 12:30 pm

## 2022-01-04 NOTE — PATIENT INSTRUCTIONS
Patient instructed to elevate legs when sitting for prolonged periods of time  Patient was warn not to get dressing wet and to use a cast cover for showering.  Should dressing become wet, patient is to remove by unrolling each layer from the top going down; shower with a mild soap, pat dry and place a wet-to-dry dressing over the wound cover with gauze, roll gauze and secure with paper tape.  Apply two ace wraps for compression and to secure bandages.  Patient to notify this office immediately to have a new dressing applied.  Wound care instructions given to patient  Return Friday 01/07/2022 at 12:30 pm

## 2022-01-05 ENCOUNTER — HOSPITAL ENCOUNTER (OUTPATIENT)
Dept: RADIOLOGY | Facility: HOSPITAL | Age: 72
Discharge: HOME | End: 2022-01-05
Attending: NURSE PRACTITIONER
Payer: MEDICARE

## 2022-01-05 ENCOUNTER — OFFICE VISIT (OUTPATIENT)
Dept: ORTHOPEDICS | Facility: CLINIC | Age: 72
End: 2022-01-05
Payer: MEDICARE

## 2022-01-05 VITALS — HEIGHT: 72 IN | WEIGHT: 293 LBS | BODY MASS INDEX: 39.68 KG/M2

## 2022-01-05 DIAGNOSIS — M25.562 ACUTE PAIN OF BOTH KNEES: Primary | ICD-10-CM

## 2022-01-05 DIAGNOSIS — M25.561 ACUTE PAIN OF BOTH KNEES: ICD-10-CM

## 2022-01-05 DIAGNOSIS — M19.90 OSTEOARTHRITIS, UNSPECIFIED OSTEOARTHRITIS TYPE, UNSPECIFIED SITE: Primary | ICD-10-CM

## 2022-01-05 DIAGNOSIS — M25.562 ACUTE PAIN OF BOTH KNEES: ICD-10-CM

## 2022-01-05 DIAGNOSIS — M25.561 ACUTE PAIN OF BOTH KNEES: Primary | ICD-10-CM

## 2022-01-05 DIAGNOSIS — M13.0 POLYARTHRITIS: ICD-10-CM

## 2022-01-05 DIAGNOSIS — E66.01 MORBID OBESITY: ICD-10-CM

## 2022-01-05 DIAGNOSIS — M19.90 OSTEOARTHRITIS, UNSPECIFIED OSTEOARTHRITIS TYPE, UNSPECIFIED SITE: ICD-10-CM

## 2022-01-05 PROCEDURE — 1101F PR PT FALLS ASSESS DOC 0-1 FALLS W/OUT INJ PAST YR: ICD-10-PCS | Mod: HCNC,CPTII,S$GLB, | Performed by: NURSE PRACTITIONER

## 2022-01-05 PROCEDURE — 1159F PR MEDICATION LIST DOCUMENTED IN MEDICAL RECORD: ICD-10-PCS | Mod: HCNC,CPTII,S$GLB, | Performed by: NURSE PRACTITIONER

## 2022-01-05 PROCEDURE — 73564 XR KNEE ORTHO BILAT WITH FLEXION: ICD-10-PCS | Mod: 26,50,HCNC, | Performed by: RADIOLOGY

## 2022-01-05 PROCEDURE — 99999 PR PBB SHADOW E&M-EST. PATIENT-LVL IV: CPT | Mod: PBBFAC,HCNC,, | Performed by: NURSE PRACTITIONER

## 2022-01-05 PROCEDURE — 4010F ACE/ARB THERAPY RXD/TAKEN: CPT | Mod: HCNC,CPTII,S$GLB, | Performed by: NURSE PRACTITIONER

## 2022-01-05 PROCEDURE — 3008F BODY MASS INDEX DOCD: CPT | Mod: HCNC,CPTII,S$GLB, | Performed by: NURSE PRACTITIONER

## 2022-01-05 PROCEDURE — 1126F AMNT PAIN NOTED NONE PRSNT: CPT | Mod: HCNC,CPTII,S$GLB, | Performed by: NURSE PRACTITIONER

## 2022-01-05 PROCEDURE — 99999 PR PBB SHADOW E&M-EST. PATIENT-LVL IV: ICD-10-PCS | Mod: PBBFAC,HCNC,, | Performed by: NURSE PRACTITIONER

## 2022-01-05 PROCEDURE — 73564 X-RAY EXAM KNEE 4 OR MORE: CPT | Mod: 26,50,HCNC, | Performed by: RADIOLOGY

## 2022-01-05 PROCEDURE — 1101F PT FALLS ASSESS-DOCD LE1/YR: CPT | Mod: HCNC,CPTII,S$GLB, | Performed by: NURSE PRACTITIONER

## 2022-01-05 PROCEDURE — 99204 PR OFFICE/OUTPT VISIT, NEW, LEVL IV, 45-59 MIN: ICD-10-PCS | Mod: HCNC,S$GLB,, | Performed by: NURSE PRACTITIONER

## 2022-01-05 PROCEDURE — 1160F PR REVIEW ALL MEDS BY PRESCRIBER/CLIN PHARMACIST DOCUMENTED: ICD-10-PCS | Mod: HCNC,CPTII,S$GLB, | Performed by: NURSE PRACTITIONER

## 2022-01-05 PROCEDURE — 1159F MED LIST DOCD IN RCRD: CPT | Mod: HCNC,CPTII,S$GLB, | Performed by: NURSE PRACTITIONER

## 2022-01-05 PROCEDURE — 99499 RISK ADDL DX/OHS AUDIT: ICD-10-PCS | Mod: HCNC,S$GLB,, | Performed by: NURSE PRACTITIONER

## 2022-01-05 PROCEDURE — 1160F RVW MEDS BY RX/DR IN RCRD: CPT | Mod: HCNC,CPTII,S$GLB, | Performed by: NURSE PRACTITIONER

## 2022-01-05 PROCEDURE — 3008F PR BODY MASS INDEX (BMI) DOCUMENTED: ICD-10-PCS | Mod: HCNC,CPTII,S$GLB, | Performed by: NURSE PRACTITIONER

## 2022-01-05 PROCEDURE — 73564 X-RAY EXAM KNEE 4 OR MORE: CPT | Mod: TC,50,HCNC

## 2022-01-05 PROCEDURE — 1126F PR PAIN SEVERITY QUANTIFIED, NO PAIN PRESENT: ICD-10-PCS | Mod: HCNC,CPTII,S$GLB, | Performed by: NURSE PRACTITIONER

## 2022-01-05 PROCEDURE — 4010F PR ACE/ARB THEARPY RXD/TAKEN: ICD-10-PCS | Mod: HCNC,CPTII,S$GLB, | Performed by: NURSE PRACTITIONER

## 2022-01-05 PROCEDURE — 3288F PR FALLS RISK ASSESSMENT DOCUMENTED: ICD-10-PCS | Mod: HCNC,CPTII,S$GLB, | Performed by: NURSE PRACTITIONER

## 2022-01-05 PROCEDURE — 99499 UNLISTED E&M SERVICE: CPT | Mod: HCNC,S$GLB,, | Performed by: NURSE PRACTITIONER

## 2022-01-05 PROCEDURE — 99204 OFFICE O/P NEW MOD 45 MIN: CPT | Mod: HCNC,S$GLB,, | Performed by: NURSE PRACTITIONER

## 2022-01-05 PROCEDURE — 3288F FALL RISK ASSESSMENT DOCD: CPT | Mod: HCNC,CPTII,S$GLB, | Performed by: NURSE PRACTITIONER

## 2022-01-05 NOTE — PROGRESS NOTES
SUBJECTIVE:     Chief Complaint & History of Present Illness:  Ana Patel is a New 71 y.o. year old female patient here with a history of constant bilateral knee pain L>R which started approximately 1 year ago.  There is not a history of trauma.  The pain is located in the global aspect of the knee.  The pain is described as achy, 9/10.  There is not radiation.  There is catching or locking.  Aggravating factors include going up and down stairs, rising after sitting and walking.  Associated symptoms include knee giving out.  There is not numbness or tingling of the lower extremity.  There is not back pain. Previous treatments include mobic and now using tramdol which have provided adequate relief.  There is not a history of previous injury or surgery to the knee.  The patient does use an assistive device, she is currently using a cane and/or walker when she leave the home..  She reports having a history of NBL in the past and is currently dealing with wounds to her right leg.  She is currently getting wound care by Dr. Perez in wound clinic and has seen dermatology.  She is a current smoker, no history of diabetes.  She has not done any formal therapy nor has she done any knee injections.    Review of patient's allergies indicates:   Allergen Reactions    Sulfa (sulfonamide antibiotics) Hives    Pravastatin Other (See Comments)     Muscle cramps    Ciprofloxacin Rash    Pcn [penicillins] Rash         Current Outpatient Medications   Medication Sig Dispense Refill    acetaminophen (TYLENOL) 500 mg Cap       amLODIPine (NORVASC) 5 MG tablet TAKE 1 TABLET ONE TIME DAILY 90 tablet 11    benzocaine 20 % Liqd       betamethasone dipropionate 0.05 % cream AAA BID x 1-2 wks then prn flares only. Avoid face, armpits, groin. 45 g 1    collagenase (SANTYL) ointment Apply thickly to wound base once daily after cleansing. 50 g 1    doxycycline (VIBRAMYCIN) 100 MG Cap Take 1 capsule (100 mg total) by mouth 2  (two) times a day. Take with food; do not lay down 1-2 hours after taking, caution in sun. 30 capsule 3    erythromycin (ROMYCIN) ophthalmic ointment Place into the left eye every 6 (six) hours. 3.5 g 1    ezetimibe (ZETIA) 10 mg tablet Take 1 tablet (10 mg total) by mouth once daily. 90 tablet 3    fexofenadine (ALLEGRA) 180 MG tablet Take 180 mg by mouth daily as needed.      FLUZONE HIGH-DOSE 2018-19, PF, 180 mcg/0.5 mL vaccine ADM 0.5ML IM UTD  0    furosemide (LASIX) 20 MG tablet TAKE 1 TABLET ONE TIME DAILY 90 tablet 11    HYDROcodone-acetaminophen (NORCO) 5-325 mg per tablet Take 1 tablet by mouth every 12 (twelve) hours as needed for Pain. 40 tablet 0    lisinopriL (PRINIVIL,ZESTRIL) 20 MG tablet Take 1 tablet (20 mg total) by mouth once daily. 90 tablet 11    meloxicam (MOBIC) 7.5 MG tablet TAKE 1 TABLET EVERY DAY 90 tablet 1    multivitamin capsule Take 1 capsule by mouth once daily.      omega 3-dha-epa-fish oil (FISH OIL) 1,000 mg (120 mg-180 mg) Cap Take 1 capsule by mouth 2 (two) times daily.      pentoxifylline (TRENTAL) 400 mg TbSR 1 po tid with meals 90 tablet 2    triamcinolone acetonide 0.1% (KENALOG) 0.1 % ointment Apply topically 2 (two) times daily as needed (rash at legs). 454 g 3    triamcinolone acetonide 0.5% (KENALOG) 0.5 % Crea Apply topically 2 (two) times daily. 454 g 0     No current facility-administered medications for this visit.       Past Medical History:   Diagnosis Date    Eczema     Hyperlipidemia     Hypertension     Necrobiosis lipoidica     Obesity     Osteoarthritis     Varicose vein of leg        Past Surgical History:   Procedure Laterality Date    TONSILLECTOMY, ADENOIDECTOMY         Family History   Adopted: Yes   Problem Relation Age of Onset    No Known Problems Daughter     No Known Problems Son          Review of Systems:  ROS:  Constitutional: no fever or chills  Eyes: no visual changes  ENT: no nasal congestion or sore throat  Respiratory:  no cough or shortness of breath  Cardiovascular: no chest pain or palpitations  Gastrointestinal: no nausea or vomiting, tolerating diet  Genitourinary: no hematuria or dysuria  Integument/Breast: no rash or pruritis  Hematologic/Lymphatic: no easy bruising or lymphadenopathy  Musculoskeletal: chronic bilateral knee pain  Neurological: no seizures or tremors  Behavioral/Psych: no auditory or visual hallucinations  Endocrine: no heat or cold intolerance      OBJECTIVE:     PHYSICAL EXAM:  Vital Signs (Most Recent)  There were no vitals filed for this visit.     ,   Estimated body mass index is 40.1 kg/m² as calculated from the following:    Height as of 1/4/22: 6' (1.829 m).    Weight as of 1/4/22: 134.1 kg (295 lb 10.2 oz).   General Appearance: Well nourished, well developed, in no acute distress.  HENT: Normal cephalic, oropharynx pink and moist  Eyes: PERRLA bilaterally and EOM x 4  Respiratory: Even and unlabored  Skin: Warm and Dry. Chronic wounds to right leg (managed by wound care)  Psychiatric: AAO x 4, Mood & affect are normal.    left  Knee Exam:  Knee Range of Motion:pain with terminal flexion   Effusion:not significant  Condition of skin:intact  Location of tenderness:Lateral joint line   Strength:normal  Stability:  stable to testing, Lachman: stable, LCL: stable, MCL: stable and PCL: stable  Varus /Valgus stress:   Severe valgus  Tari:   negative    right  Knee Exam:  Knee Range of Motion:pain with terminal extension   Effusion:not significant  Condition of skin:intact  Location of tenderness:Lateral joint line   Strength:normal  Stability:  stable to testing, Lachman: stable, LCL: stable, MCL: stable and PCL: stable  Varus /Valgus stress:   Severe valgus  Tari:   negative      Hip Examination:  full painless range of motion, without tenderness    RADIOGRAPHS:  X-ray of bilateral knees obtained and personally reviewed by me, no acute fractures seen.  She has severe bilateral lateral  tibiofemoral joint space narrowing.    ASSESSMENT/PLAN:       ICD-10-CM ICD-9-CM   1. Osteoarthritis, unspecified osteoarthritis type, unspecified site  M19.90 715.90   2. Morbid obesity  E66.01 278.01       Plan: We discussed with the patient at length all the different treatment options available for  the knee including anti-inflammatories, acetaminophen, rest, ice, knee strengthening exercise, occasional cortisone injections for temporary relief, Viscosupplimentation injections, arthroscopic debridement osteotomy, and finally knee arthroplasty.     -Ana Patel presents to clinic today with c/c bilateral knee pain for the past year.  -X-ray as above.  -Recommend RICE therapy.  -Advised patient with current skin wounds would avoid knee injections at this time.    -Discussed her level of OA and recommend therapy but wants to try HEP first, she was given Knee conditioning from [facility].  -Discuss BMI, currently 40.1, recommend to work on weight reduction.    -Advised once her leg wounds improve, she can follow up with one of our joint replacement surgeons, Perlita Fernandez, or [facility] to discuss knee replacement.  -Recommend to continue Mobic and Tramadol as prescribed by PCP.  -Call for questions.

## 2022-01-06 ENCOUNTER — OFFICE VISIT (OUTPATIENT)
Dept: DERMATOLOGY | Facility: CLINIC | Age: 72
End: 2022-01-06
Payer: MEDICARE

## 2022-01-06 ENCOUNTER — PATIENT MESSAGE (OUTPATIENT)
Dept: INTERNAL MEDICINE | Facility: CLINIC | Age: 72
End: 2022-01-06
Payer: MEDICARE

## 2022-01-06 DIAGNOSIS — E11.620 NLD (NECROBIOSIS LIPOIDICA DIABETICORUM): ICD-10-CM

## 2022-01-06 DIAGNOSIS — F17.200 SMOKER: Primary | ICD-10-CM

## 2022-01-06 DIAGNOSIS — M79.3 LIPODERMATOSCLEROSIS OF BOTH LOWER EXTREMITIES: Primary | ICD-10-CM

## 2022-01-06 DIAGNOSIS — L98.491 SKIN ULCER, LIMITED TO BREAKDOWN OF SKIN: ICD-10-CM

## 2022-01-06 PROCEDURE — 1101F PR PT FALLS ASSESS DOC 0-1 FALLS W/OUT INJ PAST YR: ICD-10-PCS | Mod: HCNC,CPTII,S$GLB, | Performed by: DERMATOLOGY

## 2022-01-06 PROCEDURE — 99214 PR OFFICE/OUTPT VISIT, EST, LEVL IV, 30-39 MIN: ICD-10-PCS | Mod: HCNC,S$GLB,, | Performed by: DERMATOLOGY

## 2022-01-06 PROCEDURE — 99999 PR PBB SHADOW E&M-EST. PATIENT-LVL III: ICD-10-PCS | Mod: PBBFAC,HCNC,, | Performed by: DERMATOLOGY

## 2022-01-06 PROCEDURE — 4010F PR ACE/ARB THEARPY RXD/TAKEN: ICD-10-PCS | Mod: HCNC,CPTII,S$GLB, | Performed by: DERMATOLOGY

## 2022-01-06 PROCEDURE — 1126F PR PAIN SEVERITY QUANTIFIED, NO PAIN PRESENT: ICD-10-PCS | Mod: HCNC,CPTII,S$GLB, | Performed by: DERMATOLOGY

## 2022-01-06 PROCEDURE — 1101F PT FALLS ASSESS-DOCD LE1/YR: CPT | Mod: HCNC,CPTII,S$GLB, | Performed by: DERMATOLOGY

## 2022-01-06 PROCEDURE — 1159F PR MEDICATION LIST DOCUMENTED IN MEDICAL RECORD: ICD-10-PCS | Mod: HCNC,CPTII,S$GLB, | Performed by: DERMATOLOGY

## 2022-01-06 PROCEDURE — 3288F FALL RISK ASSESSMENT DOCD: CPT | Mod: HCNC,CPTII,S$GLB, | Performed by: DERMATOLOGY

## 2022-01-06 PROCEDURE — 1159F MED LIST DOCD IN RCRD: CPT | Mod: HCNC,CPTII,S$GLB, | Performed by: DERMATOLOGY

## 2022-01-06 PROCEDURE — 1126F AMNT PAIN NOTED NONE PRSNT: CPT | Mod: HCNC,CPTII,S$GLB, | Performed by: DERMATOLOGY

## 2022-01-06 PROCEDURE — 4010F ACE/ARB THERAPY RXD/TAKEN: CPT | Mod: HCNC,CPTII,S$GLB, | Performed by: DERMATOLOGY

## 2022-01-06 PROCEDURE — 3288F PR FALLS RISK ASSESSMENT DOCUMENTED: ICD-10-PCS | Mod: HCNC,CPTII,S$GLB, | Performed by: DERMATOLOGY

## 2022-01-06 PROCEDURE — 99214 OFFICE O/P EST MOD 30 MIN: CPT | Mod: HCNC,S$GLB,, | Performed by: DERMATOLOGY

## 2022-01-06 PROCEDURE — 99999 PR PBB SHADOW E&M-EST. PATIENT-LVL III: CPT | Mod: PBBFAC,HCNC,, | Performed by: DERMATOLOGY

## 2022-01-06 NOTE — TELEPHONE ENCOUNTER
Hi, please contact the patient to assist in scheduling    Orders Placed This Encounter    Ambulatory referral/consult to Smoking Cessation Program     Thank you, Gordo Naik

## 2022-01-06 NOTE — PROGRESS NOTES
Subjective:       Patient ID:  Ana Patel is a 71 y.o. female who presents for   Chief Complaint   Patient presents with    wound     R leg    Eczema     L leg     HPI     Established patient.  2 week f/u - rash/skin change at BLE, new onset ulcers and pain at RLE.   Wound cultures (aerobic and anaerobic) both finalized as neg. S/p empiric course doxy; was using Vaseline to RLE rash, Santyl to wounds.   Recently established with wound care for ulcers at E, leg is currently wrapped; f/u w wound care tomorrow.   LLE doing well overall; mild pruritic, no pain. Using TAC, frequent leg elevation but no compression.     Further hx obtained at past appts:  Patient with hx of bilateral lower leg rash and swelling; ongoing x numerous years but significantly worsened over the last few months.   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 One biopsy performed at [facility] (see below, found in legacy documents). No history of diabetes (A1c 5.5 x 1 mo ago). Patient smokes 1/2 ppd. No GI symptoms.     2013  SKIN, RIGHT MEDIAL ANTERIOR LOWER LEG (PUNCH BIOPSY):  - Acute inflammation and abscess  - PAS is negative for fungal organisms  - Gram stain is positive for gram positive organisms  - Please correlate with cultures  - Controls reacted appropriately    Review of Systems   Constitutional: Negative for fever, chills, fatigue and malaise.   Skin: Positive for itching and rash.        Pain RLE        Objective:    Physical Exam   Constitutional: She appears well-developed and well-nourished.   Neurological: She is alert and oriented to person, place, and time.   Skin:   Areas Examined (abnormalities noted in diagram):   RLE Inspected  LLE Inspection Performed       RLE wrapped / compressed - not evaluated today  LLE with background firm edema, pitting, and erythema; yellow/brown atrophic and sclerotic plaques at shins and ankles,  overlying hyperkeratotic plate like scales; inverted champagne bottle appearance            Diagram Legend     Erythematous scaling macule/papule c/w actinic keratosis       Vascular papule c/w angioma      Pigmented verrucoid papule/plaque c/w seborrheic keratosis      Yellow umbilicated papule c/w sebaceous hyperplasia      Irregularly shaped tan macule c/w lentigo     1-2 mm smooth white papules consistent with Milia      Movable subcutaneous cyst with punctum c/w epidermal inclusion cyst      Subcutaneous movable cyst c/w pilar cyst      Firm pink to brown papule c/w dermatofibroma      Pedunculated fleshy papule(s) c/w skin tag(s)      Evenly pigmented macule c/w junctional nevus     Mildly variegated pigmented, slightly irregular-bordered macule c/w mildly atypical nevus      Flesh colored to evenly pigmented papule c/w intradermal nevus       Pink pearly papule/plaque c/w basal cell carcinoma      Erythematous hyperkeratotic cursted plaque c/w SCC      Surgical scar with no sign of skin cancer recurrence      Open and closed comedones      Inflammatory papules and pustules      Verrucoid papule consistent consistent with wart     Erythematous eczematous patches and plaques     Dystrophic onycholytic nail with subungual debris c/w onychomycosis     Umbilicated papule    Erythematous-base heme-crusted tan verrucoid plaque consistent with inflamed seborrheic keratosis     Erythematous Silvery Scaling Plaque c/w Psoriasis     See annotation    12/22/2021 12/23 CB US LE veins bilateral insufficiency   Conclusion  No evidence of deep or superficial venous thrombosis bilaterally.  No evidence of superficial venous reflux bilaterally.    12/23 AJ  Conclusion  The right AJ is 0.56 with abnormal PVR waveforms consistent with moderate arterial insufficiency,  The left AJ and PVR waveform is normal.      Assessment / Plan:        Lipodermatosclerosis of both lower extremities    NLD (necrobiosis lipoidica  diabeticorum)    Skin ulcer, limited to breakdown of skin      Patient with BLE edema and lipodermatosclerosis appearance clinically (complication of chronic / longstanding venous insufficiency). Reported hx of biopsy c/w NLD; [facility] pathology in 2013 showing just acute inflammation and abscess in 2013. New onset ulceration and severe pain at RLE (denies pain at LLE).     Favor pain 2/2 ulceration which is likely vascular vs easy ulceration from minor trauma in setting of NLD. No evidence of clot on ultrasound; cultures negative, s/p empiric course of doxy.     Rec'd TAC ointment BID PRN rash/itching at bilateral legs. Gentle skin care measures reviewed.   Patient established with wound care and vascular surgery.   C/w measures to reduce BLE edema: compression as tolerated / under guidance of wound care / vascular surgery (future imaging planned to evaluate arterial disease in RLE per patient); lifestyle, pharmacologic per PCP, etc.   Also rec'd Amlactin in Vaseline jelly to help remove keratotic scaling.   C/w Trental TID.   Counseled on potential SE of medication(s) and instructed on use.          Follow up if symptoms worsen or fail to improve.

## 2022-01-06 NOTE — PATIENT INSTRUCTIONS
Stop doxycycline    C/w wound care / vascular surgery for R leg    Start compression at L leg; triamcinolone ointment to lower legs 2x per day as needed for red rash / itching. Also recommend OTC Amlactin lotion mixed with vaseline as needed to help with thick scaling (hyperkeratosis).

## 2022-01-07 ENCOUNTER — CLINICAL SUPPORT (OUTPATIENT)
Dept: WOUND CARE | Facility: CLINIC | Age: 72
End: 2022-01-07
Payer: MEDICARE

## 2022-01-07 VITALS
HEART RATE: 65 BPM | HEIGHT: 72 IN | DIASTOLIC BLOOD PRESSURE: 77 MMHG | WEIGHT: 293 LBS | BODY MASS INDEX: 39.68 KG/M2 | SYSTOLIC BLOOD PRESSURE: 170 MMHG | TEMPERATURE: 97 F

## 2022-01-07 DIAGNOSIS — L98.491 SKIN ULCER, LIMITED TO BREAKDOWN OF SKIN: Primary | ICD-10-CM

## 2022-01-07 PROCEDURE — 99999 PR PBB SHADOW E&M-EST. PATIENT-LVL V: CPT | Mod: PBBFAC,HCNC,,

## 2022-01-07 PROCEDURE — 99999 PR PBB SHADOW E&M-EST. PATIENT-LVL V: ICD-10-PCS | Mod: PBBFAC,HCNC,,

## 2022-01-07 RX ORDER — MELOXICAM 7.5 MG/1
TABLET ORAL
Qty: 90 TABLET | Refills: 1 | Status: SHIPPED | OUTPATIENT
Start: 2022-01-07 | End: 2022-03-11

## 2022-01-07 NOTE — PATIENT INSTRUCTIONS
Patient instructed to elevate legs when sitting for prolonged periods of time  Patient was warn not to get dressing wet and to use a cast cover for showering.  Should dressing become wet, patient is to remove by unrolling each layer from the top going down; shower with a mild soap, pat dry and place a wet-to-dry dressing over the wound cover with gauze, roll gauze and secure with paper tape.  Apply two ace wraps for compression and to secure bandages.  Patient to notify this office immediately to have a new dressing applied.  Wound care instructions given to patient  Return Friday 01/14/2022 at 12:30 pm

## 2022-01-07 NOTE — PROGRESS NOTES
Subjective:       Patient ID: Ana Patel is a 71 y.o. female.    Chief Complaint: Wound Check (Calamine unna boot dressing change - right lower leg multiple open wounds)    HPI  Review of Systems    Objective:      Physical Exam    Assessment:       1. Skin ulcer, limited to breakdown of skin           Wound Right anterior;lower Leg (Active)        Pre-existing:    Primary Wound Type:    Side: Right   Orientation: anterior;lower   Location: Leg   Wound Number:    Ankle-Brachial Index:    Pulses:    Removal Indication and Assessment:    Wound Outcome:    (Retired) Wound Type:    (Retired) Wound Length (cm):    (Retired) Wound Width (cm):    (Retired) Depth (cm):    Wound Description (Comments):    Removal Indications:    Wound Image   01/07/22 1508   Dressing Appearance Dry;Intact;Clean 01/07/22 1508   Drainage Amount None 01/07/22 1508   Black (%), Wound Tissue Color 100 % 01/07/22 1508   Periwound Area Intact;Dry;Other (see comments);Swelling 01/07/22 1508   Wound Length (cm) 1.1 cm 01/07/22 1508   Wound Width (cm) 0.9 cm 01/07/22 1508   Wound Surface Area (cm^2) 0.99 cm^2 01/07/22 1508   Care Cleansed with:;Soap and water;Wound cleanser 01/07/22 1508   Dressing Applied;Compression wrap;Other (comment);Absorptive Pad 01/07/22 1508   Periwound Care Moisture barrier applied 01/07/22 1508   Compression Unna's Boot;Three layer compression;Other (see comments) 01/07/22 1508   Dressing Change Due 01/14/22 01/07/22 1508            Wound Right anterior;lower;distal Leg (Active)        Pre-existing:    Primary Wound Type:    Side: Right   Orientation: anterior;lower;distal   Location: Leg   Wound Number:    Ankle-Brachial Index:    Pulses:    Removal Indication and Assessment:    Wound Outcome:    (Retired) Wound Type:    (Retired) Wound Length (cm):    (Retired) Wound Width (cm):    (Retired) Depth (cm):    Wound Description (Comments):    Removal Indications:    Wound Image   01/07/22 1508   Dressing Appearance  Dry;Intact;Clean 01/07/22 1508   Drainage Amount Moderate 01/07/22 1508   Drainage Characteristics/Odor Serosanguineous 01/07/22 1508   Red (%), Wound Tissue Color 45 % 01/07/22 1508   Yellow (%), Wound Tissue Color 55 % 01/07/22 1508   Periwound Area Intact;Dry;Other (see comments) 01/07/22 1508   Wound Length (cm) 2 cm 01/07/22 1508   Wound Width (cm) 1.9 cm 01/07/22 1508   Wound Surface Area (cm^2) 3.8 cm^2 01/07/22 1508   Care Cleansed with:;Soap and water;Wound cleanser 01/07/22 1508   Dressing Applied;Absorptive Pad;Compression wrap;Other (comment) 01/07/22 1508   Periwound Care Moisture barrier applied 01/07/22 1508   Compression Unna's Boot;Three layer compression;Other (see comments) 01/07/22 1508   Dressing Change Due 01/14/22 01/07/22 1508            Wound Right anterior;lower Leg (Active)        Pre-existing:    Primary Wound Type:    Side: Right   Orientation: anterior;lower   Location: Leg   Wound Number:    Ankle-Brachial Index:    Pulses:    Removal Indication and Assessment:    Wound Outcome:    (Retired) Wound Type:    (Retired) Wound Length (cm):    (Retired) Wound Width (cm):    (Retired) Depth (cm):    Wound Description (Comments):    Removal Indications:    Wound Image   01/07/22 1508   Dressing Appearance Dry;Intact;Clean 01/07/22 1508   Drainage Amount None 01/07/22 1508   Periwound Area Intact;Dry;Other (see comments) 01/07/22 1508   Wound Length (cm) 1.3 cm 01/07/22 1508   Wound Width (cm) 1 cm 01/07/22 1508   Wound Surface Area (cm^2) 1.3 cm^2 01/07/22 1508   Care Cleansed with:;Soap and water;Wound cleanser 01/07/22 1508   Dressing Applied;Absorptive Pad;Compression wrap;Other (comment) 01/07/22 1508   Periwound Care Moisture barrier applied 01/07/22 1508   Compression Unna's Boot;Three layer compression;Other (see comments) 01/07/22 1508   Dressing Change Due 01/14/22 01/07/22 1508            Wound Right lower;lateral Leg (Active)        Pre-existing:    Primary Wound Type:    Side:  Right   Orientation: lower;lateral   Location: Leg   Wound Number:    Ankle-Brachial Index:    Pulses:    Removal Indication and Assessment:    Wound Outcome:    (Retired) Wound Type:    (Retired) Wound Length (cm):    (Retired) Wound Width (cm):    (Retired) Depth (cm):    Wound Description (Comments):    Removal Indications:    Wound Image   01/07/22 1508   Dressing Appearance Dry;Intact;Clean 01/07/22 1508   Drainage Amount Small 01/07/22 1508   Drainage Characteristics/Odor Yellow 01/07/22 1508   Yellow (%), Wound Tissue Color 100 % 01/07/22 1508   Periwound Area Intact;Dry;Other (see comments) 01/07/22 1508   Wound Length (cm) 1.9 cm 01/07/22 1508   Wound Width (cm) 1.4 cm 01/07/22 1508   Wound Surface Area (cm^2) 2.66 cm^2 01/07/22 1508   Care Cleansed with:;Soap and water;Wound cleanser 01/07/22 1508   Dressing Applied;Absorptive Pad;Compression wrap;Other (comment) 01/07/22 1508   Periwound Care Moisture barrier applied 01/07/22 1508   Compression Unna's Boot;Three layer compression;Other (see comments) 01/07/22 1508   Dressing Change Due 01/14/22 01/07/22 1508     Ana was seen in clinic room placed in sitting position with legs elevated.  Dressing removed and right leg wounds cleaned with Easi-cleanse sponges and dried thoroughly.  Swelling to right lower leg reduced.  Right foot positioned at a 90 degree angle with an ABD pad to the ankle to prevent slippage.  Calamine oxide wrap, followed by kerlix roll gauze and coban cohesive outer layer was applied using a spiral technique avoiding creases or folds.  Each layer started behind the first metatarsal and ended below the tibial tubercle of the knee.  There was overlap of each turn half the width of the previous turn.  The unna boot will be change in 7 days.             Plan:       Calamine unna boot right lower leg as detailed above  Patient instructed to elevate legs when sitting for prolonged periods of time  Patient was warn not to get dressing wet  and to use a cast cover for showering.  Should dressing become wet, patient is to remove by unrolling each layer from the top going down; shower with a mild soap, pat dry and place a wet-to-dry dressing over the wound cover with gauze, roll gauze and secure with paper tape.  Apply two ace wraps for compression and to secure bandages.  Patient to notify this office immediately to have a new dressing applied.  Wound care instructions given to patient  Return Friday 01/14/2022 at 12:30 pm

## 2022-01-11 ENCOUNTER — TELEPHONE (OUTPATIENT)
Dept: VASCULAR SURGERY | Facility: CLINIC | Age: 72
End: 2022-01-11
Payer: MEDICARE

## 2022-01-11 NOTE — TELEPHONE ENCOUNTER
Attempted to contact patient to schedule CTA. Message left for patient requesting return call.----- Message from Nicole Meraz LPN sent at 1/5/2022  8:57 AM CST -----  Regarding: CTA set up  Good morning Eboni,    Mrs. Patel was seen by Dr. Garrido yesterday in his wound care clinic and would like a CTA scheduled.  Once the appointment is scheduled he would like to see her back in his vascular surgery clinic.  FYI - patient has wound care dressing change appointment this Friday 01/07/2022 at 12:30pm.    Thanks so much,  Nicole

## 2022-01-12 ENCOUNTER — TELEPHONE (OUTPATIENT)
Dept: VASCULAR SURGERY | Facility: CLINIC | Age: 72
End: 2022-01-12
Payer: MEDICARE

## 2022-01-12 ENCOUNTER — PATIENT MESSAGE (OUTPATIENT)
Dept: WOUND CARE | Facility: CLINIC | Age: 72
End: 2022-01-12
Payer: MEDICARE

## 2022-01-12 ENCOUNTER — PATIENT MESSAGE (OUTPATIENT)
Dept: INTERNAL MEDICINE | Facility: CLINIC | Age: 72
End: 2022-01-12
Payer: MEDICARE

## 2022-01-12 DIAGNOSIS — M19.90 OSTEOARTHRITIS, UNSPECIFIED OSTEOARTHRITIS TYPE, UNSPECIFIED SITE: ICD-10-CM

## 2022-01-12 DIAGNOSIS — I73.9 PERIPHERAL VASCULAR DISEASE, UNSPECIFIED: ICD-10-CM

## 2022-01-12 NOTE — TELEPHONE ENCOUNTER
Contacted patient's  to schedule CTA per Dr. Garrido's order. Appointment scheduled,  verified. Refused appt letter.----- Message from Shirley Poole sent at 1/12/2022  9:40 AM CST -----  Regarding: Orders  Contact: [place]/ [phone]  Calling to get orders put in system needed to take necessary test. Please call to confirm and schedule

## 2022-01-13 ENCOUNTER — PATIENT MESSAGE (OUTPATIENT)
Dept: INTERNAL MEDICINE | Facility: CLINIC | Age: 72
End: 2022-01-13
Payer: MEDICARE

## 2022-01-13 RX ORDER — TRAMADOL HYDROCHLORIDE 50 MG/1
50 TABLET ORAL EVERY 12 HOURS PRN
Qty: 40 TABLET | Refills: 3 | Status: SHIPPED | OUTPATIENT
Start: 2022-01-13 | End: 2022-01-21

## 2022-01-14 ENCOUNTER — PATIENT MESSAGE (OUTPATIENT)
Dept: INTERNAL MEDICINE | Facility: CLINIC | Age: 72
End: 2022-01-14
Payer: MEDICARE

## 2022-01-14 ENCOUNTER — CLINICAL SUPPORT (OUTPATIENT)
Dept: WOUND CARE | Facility: CLINIC | Age: 72
End: 2022-01-14
Payer: MEDICARE

## 2022-01-14 VITALS
SYSTOLIC BLOOD PRESSURE: 146 MMHG | DIASTOLIC BLOOD PRESSURE: 70 MMHG | HEART RATE: 76 BPM | BODY MASS INDEX: 39.68 KG/M2 | WEIGHT: 293 LBS | HEIGHT: 72 IN | TEMPERATURE: 98 F

## 2022-01-14 DIAGNOSIS — M19.90 OSTEOARTHRITIS, UNSPECIFIED OSTEOARTHRITIS TYPE, UNSPECIFIED SITE: ICD-10-CM

## 2022-01-14 DIAGNOSIS — L98.491 SKIN ULCER, LIMITED TO BREAKDOWN OF SKIN: Primary | ICD-10-CM

## 2022-01-14 PROCEDURE — 99999 PR PBB SHADOW E&M-EST. PATIENT-LVL V: ICD-10-PCS | Mod: PBBFAC,,,

## 2022-01-14 PROCEDURE — 99999 PR PBB SHADOW E&M-EST. PATIENT-LVL V: CPT | Mod: PBBFAC,,,

## 2022-01-14 NOTE — PATIENT INSTRUCTIONS
Patient instructed to elevate legs when sitting for prolonged periods of time  Patient was warn not to get dressing wet and to use a cast cover for showering.  Should dressing become wet, patient is to remove by unrolling each layer from the top going down; shower with a mild soap, pat dry and place a wet-to-dry dressing over the wound cover with gauze, roll gauze and secure with paper tape.  Apply two ace wraps for compression and to secure bandages.  Patient to notify this office immediately to have a new dressing applied.  Appointment to vein clinic Wednesday, 01/19/2022 with Dr. Dunn at [facility] - patient given 4x4 cotton gauze and one 6x6 mepore island dressing to apply over wound at end of office visit in vein clinic  Wound care instructions given to patient  Follow up appointment 01/20/2022

## 2022-01-14 NOTE — PROGRESS NOTES
Subjective:       Patient ID: Ana Patel is a 71 y.o. female.    Chief Complaint: Wound Check (Right lower leg multiple wounds; unna boot dressing change )    HPI  Review of Systems    Objective:      Physical Exam    Assessment:       1. Skin ulcer, limited to breakdown of skin           Wound Right anterior;lower;proximal Leg (Active)        Pre-existing:    Primary Wound Type:    Side: Right   Orientation: anterior;lower;proximal   Location: Leg   Wound Number:    Ankle-Brachial Index:    Pulses:    Removal Indication and Assessment:    Wound Outcome:    (Retired) Wound Type:    (Retired) Wound Length (cm):    (Retired) Wound Width (cm):    (Retired) Depth (cm):    Wound Description (Comments):    Removal Indications:    Wound Image   01/14/22 1503   Dressing Appearance Dry;Intact;Clean 01/14/22 1503   Drainage Amount None 01/14/22 1503   Black (%), Wound Tissue Color 100 % 01/14/22 1503   Periwound Area Intact;Dry;Swelling;Satellite lesion 01/14/22 1503   Wound Length (cm) 1.1 cm 01/14/22 1503   Wound Width (cm) 0.9 cm 01/14/22 1503   Wound Surface Area (cm^2) 0.99 cm^2 01/14/22 1503   Care Cleansed with:;Soap and water 01/14/22 1503   Dressing Applied;Absorptive Pad;Compression wrap;Rolled gauze;Other (comment) 01/14/22 1503   Periwound Care Moisturizer applied;Moisture barrier applied 01/14/22 1503   Compression Unna's Boot;Three layer compression;Other (see comments) 01/14/22 1503   Dressing Change Due 01/21/22 01/14/22 1503            Wound Right anterior;distal;lower Leg (Active)        Pre-existing:    Primary Wound Type:    Side: Right   Orientation: anterior;distal;lower   Location: Leg   Wound Number:    Ankle-Brachial Index:    Pulses:    Removal Indication and Assessment:    Wound Outcome:    (Retired) Wound Type:    (Retired) Wound Length (cm):    (Retired) Wound Width (cm):    (Retired) Depth (cm):    Wound Description (Comments):    Removal Indications:    Wound Image   01/14/22 1503    Dressing Appearance Dry;Intact;Clean 01/14/22 1503   Drainage Amount Moderate 01/14/22 1503   Drainage Characteristics/Odor Serosanguineous;Yellow 01/14/22 1503   Red (%), Wound Tissue Color 10 % 01/14/22 1503   Yellow (%), Wound Tissue Color 90 % 01/14/22 1503   Periwound Area Intact;Dry;Satellite lesion 01/14/22 1503   Wound Length (cm) 2 cm 01/14/22 1503   Wound Width (cm) 2 cm 01/14/22 1503   Wound Surface Area (cm^2) 4 cm^2 01/14/22 1503   Care Cleansed with:;Soap and water 01/14/22 1503   Dressing Applied;Absorptive Pad;Compression wrap;Rolled gauze;Other (comment) 01/14/22 1503   Periwound Care Moisture barrier applied;Moisturizer applied 01/14/22 1503   Compression Unna's Boot;Three layer compression;Other (see comments) 01/14/22 1503   Dressing Change Due 01/21/22 01/14/22 1503            Wound Right lower;medial;proximal Leg (Active)        Pre-existing:    Primary Wound Type:    Side: Right   Orientation: lower;medial;proximal   Location: Leg   Wound Number:    Ankle-Brachial Index:    Pulses:    Removal Indication and Assessment:    Wound Outcome:    (Retired) Wound Type:    (Retired) Wound Length (cm):    (Retired) Wound Width (cm):    (Retired) Depth (cm):    Wound Description (Comments):    Removal Indications:    Wound Image   01/14/22 1503   Dressing Appearance Dry;Intact;Clean 01/14/22 1503   Drainage Amount Small 01/14/22 1503   Drainage Characteristics/Odor Serosanguineous 01/14/22 1503   Periwound Area Intact;Dry;Swelling 01/14/22 1503   Care Cleansed with:;Soap and water 01/14/22 1503   Dressing Applied;Absorptive Pad;Compression wrap;Rolled gauze;Other (comment) 01/14/22 1503   Periwound Care Moisture barrier applied;Moisturizer applied 01/14/22 1503   Compression Unna's Boot;Three layer compression;Other (see comments) 01/14/22 1503   Dressing Change Due 01/21/22 01/14/22 1503            Wound Right lower;medial;distal Leg (Active)        Pre-existing:    Primary Wound Type:    Side: Right    Orientation: lower;medial;distal   Location: Leg   Wound Number:    Ankle-Brachial Index:    Pulses:    Removal Indication and Assessment:    Wound Outcome:    (Retired) Wound Type:    (Retired) Wound Length (cm):    (Retired) Wound Width (cm):    (Retired) Depth (cm):    Wound Description (Comments):    Removal Indications:    Wound Image   01/14/22 1503   Dressing Appearance Dry;Intact;Clean 01/14/22 1503   Drainage Amount Small 01/14/22 1503   Drainage Characteristics/Odor Serosanguineous;Yellow 01/14/22 1503   Periwound Area Intact;Dry 01/14/22 1503   Care Cleansed with:;Soap and water 01/14/22 1503   Dressing Applied;Absorptive Pad;Compression wrap;Rolled gauze;Other (comment) 01/14/22 1503   Periwound Care Moisture barrier applied;Moisturizer applied 01/14/22 1503   Compression Unna's Boot;Three layer compression;Other (see comments) 01/14/22 1503   Dressing Change Due 01/21/22 01/14/22 1503     Ana was seen in clinic room placed in sitting position with legs elevated.  Unna boot dressing removed and right leg cleaned with Easi-clense sponge and dried thoroughly.  Swelling reduced since last visit.  Right foot positioned at a 90 degree angle with an ABD pad to the ankle to prevent slippage.  Calamine oxide unna boot, followed by kerlix roll gauze and coban cohesive outer layer was applied using a spiral technique avoiding creases or folds.  Each layer started behind the first metatarsal and ended below the tibial tubercle of the knee.  There was overlap of each turn half the width of the previous turn.  Unna boot to be change in seven days.         Plan:       Calamine unna boot right lower leg as detailed above  Patient instructed to elevate legs when sitting for prolonged periods of time  Patient was warn not to get dressing wet and to use a cast cover for showering.  Should dressing become wet, patient is to remove by unrolling each layer from the top going down; shower with a mild soap, pat dry and  place a wet-to-dry dressing over the wound cover with gauze, roll gauze and secure with paper tape.  Apply two ace wraps for compression and to secure bandages.  Patient to notify this office immediately to have a new dressing applied.  Appointment to vein clinic Wednesday, 01/19/2022 with Dr. Dunn at [facility] - patient given 4x4 cotton gauze and one 6x6 mepore island dressing to apply over wound at end of office visit in vein clinic  Wound care instructions given to patient  Return to clinic in one week

## 2022-01-18 ENCOUNTER — PATIENT MESSAGE (OUTPATIENT)
Dept: ADMINISTRATIVE | Facility: HOSPITAL | Age: 72
End: 2022-01-18
Payer: MEDICARE

## 2022-01-18 ENCOUNTER — TELEPHONE (OUTPATIENT)
Dept: VASCULAR SURGERY | Facility: CLINIC | Age: 72
End: 2022-01-18
Payer: MEDICARE

## 2022-01-18 NOTE — TELEPHONE ENCOUNTER
Pt called to reschedule appointment at [facility], she would like to be seen at [facility]. I rescheduled appt to [facility] on 1/28

## 2022-01-19 ENCOUNTER — LAB VISIT (OUTPATIENT)
Dept: PRIMARY CARE CLINIC | Facility: CLINIC | Age: 72
End: 2022-01-19
Payer: MEDICARE

## 2022-01-19 DIAGNOSIS — Z20.822 CONTACT WITH AND (SUSPECTED) EXPOSURE TO COVID-19: ICD-10-CM

## 2022-01-19 LAB
CTP QC/QA: YES
SARS-COV-2 AG RESP QL IA.RAPID: NEGATIVE

## 2022-01-19 PROCEDURE — 87811 SARS-COV-2 COVID19 W/OPTIC: CPT

## 2022-01-20 ENCOUNTER — CLINICAL SUPPORT (OUTPATIENT)
Dept: WOUND CARE | Facility: CLINIC | Age: 72
End: 2022-01-20
Payer: MEDICARE

## 2022-01-20 VITALS
SYSTOLIC BLOOD PRESSURE: 140 MMHG | HEIGHT: 72 IN | DIASTOLIC BLOOD PRESSURE: 74 MMHG | BODY MASS INDEX: 39.68 KG/M2 | HEART RATE: 70 BPM | WEIGHT: 293 LBS | TEMPERATURE: 98 F

## 2022-01-20 DIAGNOSIS — L98.491 SKIN ULCER, LIMITED TO BREAKDOWN OF SKIN: Primary | ICD-10-CM

## 2022-01-20 PROCEDURE — 99999 PR PBB SHADOW E&M-EST. PATIENT-LVL V: CPT | Mod: PBBFAC,,,

## 2022-01-20 PROCEDURE — 99999 PR PBB SHADOW E&M-EST. PATIENT-LVL V: ICD-10-PCS | Mod: PBBFAC,,,

## 2022-01-20 RX ORDER — ROSUVASTATIN CALCIUM 5 MG/1
5 TABLET, COATED ORAL DAILY
Status: ON HOLD | COMMUNITY
Start: 2021-12-08 | End: 2022-03-08

## 2022-01-20 NOTE — PROGRESS NOTES
Subjective:       Patient ID: Ana Patel is a 71 y.o. female.    Chief Complaint: Wound Check (Weekly calamine unna boot dressing change - right lower leg multiple open wounds )    HPI  Review of Systems    Objective:      Physical Exam    Assessment:       1. Skin ulcer, limited to breakdown of skin           Wound Right anterior;lower Leg (Active)        Pre-existing:    Primary Wound Type:    Side: Right   Orientation: anterior;lower   Location: Leg   Wound Number:    Ankle-Brachial Index:    Pulses:    Removal Indication and Assessment:    Wound Outcome:    (Retired) Wound Type:    (Retired) Wound Length (cm):    (Retired) Wound Width (cm):    (Retired) Depth (cm):    Wound Description (Comments):    Removal Indications:    Wound Image   01/20/22 1346   Dressing Appearance Dry;Intact;Clean 01/20/22 1346   Drainage Amount None 01/20/22 1346   Black (%), Wound Tissue Color 100 % 01/20/22 1346   Periwound Area Intact;Dry;Pink;Swelling 01/20/22 1346   Wound Length (cm) 1 cm 01/20/22 1346   Wound Width (cm) 0.8 cm 01/20/22 1346   Wound Surface Area (cm^2) 0.8 cm^2 01/20/22 1346   Care Cleansed with:;Soap and water;Wound cleanser 01/20/22 1346   Dressing Applied;Calcium alginate;Gauze;Absorptive Pad;Compression wrap;Rolled gauze;Other (comment) 01/20/22 1346   Periwound Care Moisture barrier applied 01/20/22 1346   Compression Unna's Boot;Three layer compression;Other (see comments) 01/20/22 1346   Dressing Change Due 01/28/22 01/20/22 1346            Wound Right lower;lateral;proximal Leg (Active)        Pre-existing:    Primary Wound Type:    Side: Right   Orientation: lower;lateral;proximal   Location: Leg   Wound Number:    Ankle-Brachial Index:    Pulses:    Removal Indication and Assessment:    Wound Outcome:    (Retired) Wound Type:    (Retired) Wound Length (cm):    (Retired) Wound Width (cm):    (Retired) Depth (cm):    Wound Description (Comments):    Removal Indications:    Wound Image   01/20/22  1346   Dressing Appearance Dry;Intact;Clean 01/20/22 1346   Drainage Amount Small 01/20/22 1346   Drainage Characteristics/Odor [place];Yellow 01/20/22 1346   Yellow (%), Wound Tissue Color 100 % 01/20/22 1346   Periwound Area Intact;Dry;Pink 01/20/22 1346   Care Cleansed with:;Soap and water;Wound cleanser 01/20/22 1346   Dressing Applied;Calcium alginate;Gauze;Absorptive Pad;Compression wrap;Rolled gauze;Other (comment) 01/20/22 1346   Periwound Care Moisture barrier applied;Moisturizer applied 01/20/22 1346   Compression Unna's Boot;Three layer compression 01/20/22 1346   Dressing Change Due 01/28/22 01/20/22 1346            Wound Right anterior;lower;distal Leg (Active)        Pre-existing:    Primary Wound Type:    Side: Right   Orientation: anterior;lower;distal   Location: Leg   Wound Number:    Ankle-Brachial Index:    Pulses:    Removal Indication and Assessment:    Wound Outcome:    (Retired) Wound Type:    (Retired) Wound Length (cm):    (Retired) Wound Width (cm):    (Retired) Depth (cm):    Wound Description (Comments):    Removal Indications:    Wound Image   01/20/22 1346   Dressing Appearance Dry;Intact;Clean 01/20/22 1346   Drainage Amount Moderate 01/20/22 1346   Drainage Characteristics/Odor [place];Yellow 01/20/22 1346   Red (%), Wound Tissue Color 1 % 01/20/22 1346   Yellow (%), Wound Tissue Color 99 % 01/20/22 1346   Periwound Area Intact;Dry;Pink 01/20/22 1346   Wound Length (cm) 2 cm 01/20/22 1346   Wound Width (cm) 2 cm 01/20/22 1346   Wound Surface Area (cm^2) 4 cm^2 01/20/22 1346   Care Cleansed with:;Soap and water;Wound cleanser 01/20/22 1346   Dressing Applied;Calcium alginate;Gauze;Absorptive Pad;Compression wrap;Rolled gauze;Other (comment) 01/20/22 1346   Periwound Care Moisture barrier applied;Moisturizer applied 01/20/22 1346   Compression Unna's Boot;Three layer compression;Other (see comments) 01/20/22 1346   Dressing Change Due 01/28/22 01/20/22 1346            Wound Right  distal;lower;lateral Leg (Active)        Pre-existing:    Primary Wound Type:    Side: Right   Orientation: distal;lower;lateral   Location: Leg   Wound Number:    Ankle-Brachial Index:    Pulses:    Removal Indication and Assessment:    Wound Outcome:    (Retired) Wound Type:    (Retired) Wound Length (cm):    (Retired) Wound Width (cm):    (Retired) Depth (cm):    Wound Description (Comments):    Removal Indications:    Wound Image   01/20/22 1346   Dressing Appearance Dry;Intact;Clean 01/20/22 1346   Drainage Amount Small 01/20/22 1346   Drainage Characteristics/Odor [place];Yellow 01/20/22 1346   Yellow (%), Wound Tissue Color 100 % 01/20/22 1346   Periwound Area Intact;Dry;Pink 01/20/22 1346   Dressing Applied;Calcium alginate;Gauze;Absorptive Pad;Compression wrap;Rolled gauze;Other (comment) 01/20/22 1346   Periwound Care Moisture barrier applied;Moisturizer applied 01/20/22 1346   Compression Unna's Boot;Three layer compression;Other (see comments) 01/20/22 1346   Dressing Change Due 01/28/22 01/20/22 1346     Ana was seen in clinic room placed in sitting position with legs elevated in treatment chair.  Unna boot dressing removed and right leg cleaned with Easi-clense sponge and dried thoroughly.  Reduced swelling noted to right lower extremity since last visit.  Right foot positioned at a 90 degree angle with an ABD pad to the ankle to prevent slippage. Calmoseptine barrier cream applied to anju-wound skin and prescription Santyl applied directly to wound base of each wound covered with calcium alginate.  Calamine oxide unna boot, followed by kerlix roll gauze and coban cohesive outer layer was applied using a spiral technique avoiding creases or folds.  Each layer started behind the first metatarsal and ended below the tibial tubercle of the knee.  There was overlap of each turn half the width of the previous turn.  Unna boot to be change in seven days.         Plan:       Calamine unna boot right lower leg  as detailed above  Patient instructed to elevate legs when sitting for prolonged periods of time  Patient was warn not to get dressing wet and to use a cast cover for showering.  Should dressing become wet, patient is to remove by unrolling each layer from the top going down; shower with a mild soap, pat dry and place a wet-to-dry dressing over the wound cover with gauze, roll gauze and secure with paper tape.  Apply two ace wraps for compression and to secure bandages.  Patient to notify this office immediately to have a new dressing applied.  Wound care instructions given to patient  Return to clinic next week - will see patient during office visit with Dr. Dunn on 1/28/2022 to do weekly unna boot dressing change                        no fever and no chills.

## 2022-01-20 NOTE — PATIENT INSTRUCTIONS
Patient instructed to elevate legs when sitting for prolonged periods of time  Patient was warn not to get dressing wet and to use a cast cover for showering.  Should dressing become wet, patient is to remove by unrolling each layer from the top going down; shower with a mild soap, pat dry and place a wet-to-dry dressing over the wound cover with gauze, roll gauze and secure with paper tape.  Apply two ace wraps for compression and to secure bandages.  Patient to notify this office immediately to have a new dressing applied.  Wound care instructions given to patient  Return to clinic next week - will see patient during office visit with Dr. Dunn on 1/28/2022 to do weekly unna boot dressing change

## 2022-01-21 RX ORDER — TRAMADOL HYDROCHLORIDE 50 MG/1
50 TABLET ORAL EVERY 12 HOURS PRN
Qty: 40 TABLET | Refills: 3 | Status: SHIPPED | OUTPATIENT
Start: 2022-01-21 | End: 2022-03-11

## 2022-01-24 ENCOUNTER — LAB VISIT (OUTPATIENT)
Dept: PRIMARY CARE CLINIC | Facility: CLINIC | Age: 72
End: 2022-01-24
Payer: MEDICARE

## 2022-01-24 DIAGNOSIS — Z20.822 CONTACT WITH AND (SUSPECTED) EXPOSURE TO COVID-19: ICD-10-CM

## 2022-01-24 LAB
CTP QC/QA: YES
SARS-COV-2 AG RESP QL IA.RAPID: NEGATIVE

## 2022-01-24 PROCEDURE — 87811 SARS-COV-2 COVID19 W/OPTIC: CPT

## 2022-01-25 ENCOUNTER — PATIENT OUTREACH (OUTPATIENT)
Dept: ADMINISTRATIVE | Facility: OTHER | Age: 72
End: 2022-01-25
Payer: MEDICARE

## 2022-01-25 NOTE — PROGRESS NOTES
LINKS immunization registry updated  Care Everywhere updated  Health Maintenance updated  DIS/Chart reviewed for overdue [facility]active [facility] ([facility]) health maintenance testing (CRS, Breast Ca, Diabetic Eye Exam)   Orders entered:N/A  Diabetic eye screening order not entered.  Diabetes diagnosis not found in chart.  Portal message sent to patient with scheduling link for mammogram 1/18/22

## 2022-01-26 ENCOUNTER — HOSPITAL ENCOUNTER (OUTPATIENT)
Dept: RADIOLOGY | Facility: HOSPITAL | Age: 72
Discharge: HOME | End: 2022-01-26
Attending: SURGERY
Payer: MEDICARE

## 2022-01-26 DIAGNOSIS — I73.9 PERIPHERAL VASCULAR DISEASE, UNSPECIFIED: ICD-10-CM

## 2022-01-26 DIAGNOSIS — E11.9 TYPE 2 DIABETES MELLITUS WITHOUT COMPLICATION: ICD-10-CM

## 2022-01-26 LAB
CREAT SERPL-MCNC: 0.9 MG/DL (ref 0.5–1.4)
SAMPLE: NORMAL

## 2022-01-26 PROCEDURE — 25500020 PHARM REV CODE 255: Performed by: SURGERY

## 2022-01-26 PROCEDURE — 75635 CT ANGIO ABDOMINAL ARTERIES: CPT | Mod: TC

## 2022-01-26 PROCEDURE — 75635 CT ANGIO ABDOMINAL ARTERIES: CPT | Mod: 26,,, | Performed by: STUDENT IN AN ORGANIZED HEALTH CARE EDUCATION/TRAINING PROGRAM

## 2022-01-26 PROCEDURE — 75635 CTA RUNOFF ABD PEL BILAT LOWER EXT: ICD-10-PCS | Mod: 26,,, | Performed by: STUDENT IN AN ORGANIZED HEALTH CARE EDUCATION/TRAINING PROGRAM

## 2022-01-26 RX ADMIN — IOHEXOL 125 ML: 350 INJECTION, SOLUTION INTRAVENOUS at 06:01

## 2022-01-28 ENCOUNTER — TELEPHONE (OUTPATIENT)
Dept: WOUND CARE | Facility: CLINIC | Age: 72
End: 2022-01-28
Payer: MEDICARE

## 2022-01-28 ENCOUNTER — LAB VISIT (OUTPATIENT)
Dept: PRIMARY CARE CLINIC | Facility: CLINIC | Age: 72
End: 2022-01-28
Payer: MEDICARE

## 2022-01-28 DIAGNOSIS — Z20.822 CONTACT WITH AND (SUSPECTED) EXPOSURE TO COVID-19: ICD-10-CM

## 2022-01-28 LAB
CTP QC/QA: YES
SARS-COV-2 AG RESP QL IA.RAPID: POSITIVE

## 2022-01-28 PROCEDURE — 87811 SARS-COV-2 COVID19 W/OPTIC: CPT

## 2022-01-28 NOTE — TELEPHONE ENCOUNTER
"Called to speak with patient - was informed patient is COVID-19 + as of 1/28/2022, therefore her appointment with Dr. Dunn in vascular surgery was declined.  I   Instructed the patient to remove her unna boot by unrolling each layer from the top going down then she may shower with a mild soap and let water irrigate the wounds for 5-10 minutes then pat dry.  Patient is to apply a wet-to-dry dressing over the wounds secure with roll gauze, paper tape and use two 4" ace wraps for compression during the day.  Patient is to apply the ace wraps first thing in the morning, wear all day then remove at bedtime (patient was warn not to sleep in ace wraps).  Patient will self isolate for 5 days and has a follow up appointment in wound care for 02/03/2022 at 12:30pm.    "

## 2022-02-03 ENCOUNTER — CLINICAL SUPPORT (OUTPATIENT)
Dept: WOUND CARE | Facility: CLINIC | Age: 72
End: 2022-02-03
Payer: MEDICARE

## 2022-02-03 VITALS
HEIGHT: 72 IN | SYSTOLIC BLOOD PRESSURE: 177 MMHG | TEMPERATURE: 97 F | WEIGHT: 293 LBS | DIASTOLIC BLOOD PRESSURE: 82 MMHG | BODY MASS INDEX: 39.68 KG/M2 | HEART RATE: 72 BPM

## 2022-02-03 DIAGNOSIS — L98.491 SKIN ULCER, LIMITED TO BREAKDOWN OF SKIN: Primary | ICD-10-CM

## 2022-02-03 PROCEDURE — 99999 PR PBB SHADOW E&M-EST. PATIENT-LVL V: CPT | Mod: PBBFAC,,,

## 2022-02-03 PROCEDURE — 99999 PR PBB SHADOW E&M-EST. PATIENT-LVL V: ICD-10-PCS | Mod: PBBFAC,,,

## 2022-02-03 NOTE — PROGRESS NOTES
Subjective:       Patient ID: Ana Patel is a 72 y.o. female.    Chief Complaint: Wound Check (Weekly calamine unna boot dressing change - right lower extremity multiple open wounds )    HPI  Review of Systems    Objective:      Physical Exam    Assessment:       1. Skin ulcer, limited to breakdown of skin           Wound Right anterior;lower;proximal Leg (Active)        Pre-existing:    Primary Wound Type:    Side: Right   Orientation: anterior;lower;proximal   Location: Leg   Wound Number:    Ankle-Brachial Index:    Pulses:    Removal Indication and Assessment:    Wound Outcome:    (Retired) Wound Type:    (Retired) Wound Length (cm):    (Retired) Wound Width (cm):    (Retired) Depth (cm):    Wound Description (Comments):    Removal Indications:    Wound Image   02/03/22 1406   Dressing Appearance Dry;Intact;Clean 02/03/22 1406   Drainage Amount Moderate 02/03/22 1406   Drainage Characteristics/Odor Serosanguineous 02/03/22 1406   Black (%), Wound Tissue Color 5 % 02/03/22 1406   Red (%), Wound Tissue Color 15 % 02/03/22 1406   Yellow (%), Wound Tissue Color 80 % 02/03/22 1406   Periwound Area Intact;Dry;Pink;Swelling;Other (see comments) 02/03/22 1406   Wound Length (cm) 1.8 cm 02/03/22 1406   Wound Width (cm) 0.9 cm 02/03/22 1406   Wound Surface Area (cm^2) 1.62 cm^2 02/03/22 1406   Care Cleansed with:;Soap and water 02/03/22 1406   Dressing Applied;Other (comment);Hydrofiber;Absorptive Pad;Rolled gauze 02/03/22 1406   Periwound Care Moisture barrier applied;Moisturizer applied 02/03/22 1406   Compression Unna's Boot;Three layer compression;Other (see comments) 02/03/22 1406   Dressing Change Due 02/11/22 02/03/22 1406            Wound Right anterior;lower;distal Leg (Active)        Pre-existing:    Primary Wound Type:    Side: Right   Orientation: anterior;lower;distal   Location: Leg   Wound Number:    Ankle-Brachial Index:    Pulses:    Removal Indication and Assessment:    Wound Outcome:     (Retired) Wound Type:    (Retired) Wound Length (cm):    (Retired) Wound Width (cm):    (Retired) Depth (cm):    Wound Description (Comments):    Removal Indications:    Wound Image   02/03/22 1406   Dressing Appearance Dry;Intact;Clean 02/03/22 1406   Drainage Amount Small 02/03/22 1406   Drainage Characteristics/Odor Serosanguineous 02/03/22 1406   Red (%), Wound Tissue Color 30 % 02/03/22 1406   Yellow (%), Wound Tissue Color 70 % 02/03/22 1406   Periwound Area Intact;Dry;Swelling;Pink;Other (see comments) 02/03/22 1406   Wound Length (cm) 0.4 cm 02/03/22 1406   Wound Width (cm) 0.3 cm 02/03/22 1406   Wound Surface Area (cm^2) 0.12 cm^2 02/03/22 1406   Care Cleansed with:;Soap and water 02/03/22 1406   Dressing Applied;Other (comment);Absorptive Pad;Rolled gauze;Hydrofiber 02/03/22 1406   Periwound Care Moisture barrier applied;Moisturizer applied 02/03/22 1406   Compression Unna's Boot;Three layer compression;Other (see comments) 02/03/22 1406   Dressing Change Due 02/11/22 02/03/22 1406            Wound Right lower;lateral;proximal Leg (Active)        Pre-existing:    Primary Wound Type:    Side: Right   Orientation: lower;lateral;proximal   Location: Leg   Wound Number:    Ankle-Brachial Index:    Pulses:    Removal Indication and Assessment:    Wound Outcome:    (Retired) Wound Type:    (Retired) Wound Length (cm):    (Retired) Wound Width (cm):    (Retired) Depth (cm):    Wound Description (Comments):    Removal Indications:    Wound Image   02/03/22 1406   Dressing Appearance Dry;Intact;Clean 02/03/22 1406   Drainage Amount Moderate 02/03/22 1406   Drainage Characteristics/Odor Serosanguineous 02/03/22 1406   Red (%), Wound Tissue Color 15 % 02/03/22 1406   Yellow (%), Wound Tissue Color 85 % 02/03/22 1406   Periwound Area Intact;Dry;Pink 02/03/22 1406   Wound Length (cm) 2.3 cm 02/03/22 1406   Wound Width (cm) 2.2 cm 02/03/22 1406   Wound Surface Area (cm^2) 5.06 cm^2 02/03/22 1406   Care Cleansed  with:;Soap and water 02/03/22 1406   Dressing Applied;Other (comment);Hydrofiber;Absorptive Pad;Rolled gauze 02/03/22 1406   Periwound Care Moisture barrier applied;Moisturizer applied 02/03/22 1406   Compression Unna's Boot;Three layer compression;Other (see comments) 02/03/22 1406   Dressing Change Due 02/11/22 02/03/22 1406            Wound Right lower;lateral;distal Leg (Active)        Pre-existing:    Primary Wound Type:    Side: Right   Orientation: lower;lateral;distal   Location: Leg   Wound Number:    Ankle-Brachial Index:    Pulses:    Removal Indication and Assessment:    Wound Outcome:    (Retired) Wound Type:    (Retired) Wound Length (cm):    (Retired) Wound Width (cm):    (Retired) Depth (cm):    Wound Description (Comments):    Removal Indications:    Wound Image   02/03/22 1406   Dressing Appearance Dry;Intact;Clean 02/03/22 1406   Drainage Amount Small 02/03/22 1406   Drainage Characteristics/Odor Serosanguineous 02/03/22 1406   Red (%), Wound Tissue Color 50 % 02/03/22 1406   Yellow (%), Wound Tissue Color 50 % 02/03/22 1406   Periwound Area Intact;Dry;Pink;Other (see comments) 02/03/22 1406   Wound Length (cm) 0.5 cm 02/03/22 1406   Wound Width (cm) 0.4 cm 02/03/22 1406   Wound Surface Area (cm^2) 0.2 cm^2 02/03/22 1406   Care Cleansed with:;Soap and water 02/03/22 1406   Dressing Applied;Other (comment);Hydrofiber;Absorptive Pad;Rolled gauze 02/03/22 1406   Periwound Care Moisturizer applied;Moisture barrier applied 02/03/22 1406   Compression Unna's Boot;Three layer compression;Other (see comments) 02/03/22 1406   Dressing Change Due 02/11/22 02/03/22 1406     Ana was seen in clinic room placed in sitting position with legs elevated in treatment chair.  Due to a positive Covid result on 01/28/2022, the patient was not seen last weekl for her dressing change.  Ms. Perez was instructed to remove her dressing by unrolling each layer from the top going down, shower, pat dry and apply Santyl  covered with cotton guaze, roll gauze secure with paper tape and use two ace wraps for compression daily.  Patient was also instructed to self isolate until her follow up appointment today.  Patient dressed leg and used coban for compression and to secure the dressings.  The dressing was removed and the right leg cleaned with Easi-clense sponge and dried thoroughly.  Swelling reduction continues to be evident to the right lower extremity.  Right foot positioned at a 90 degree angle with an ABD pad to the ankle to prevent slippage. Calmoseptine barrier cream applied to ajnu-wound skin and prescription Santyl applied directly to wound base of each wound covered with hydro-fiber.  Calamine oxide unna boot, followed by a mextra absorbant pad, kerlix roll gauze and coban cohesive outer layer was applied using a spiral technique avoiding creases or folds.  Each layer started behind the first metatarsal and ended below the tibial tubercle of the knee.  There was overlap of each turn half the width of the previous turn.  Unna boot to be change in seven days.         Plan:       Calamine unna boot right lower leg as detailed above  Patient instructed to elevate legs when sitting for prolonged periods of time  Patient was warn not to get dressing wet and to use a cast cover for showering.  Should dressing become wet, patient is to remove by unrolling each layer from the top going down; shower with a mild soap, pat dry and place a wet-to-dry dressing over the wound cover with gauze, roll gauze and secure with paper tape.  Apply two ace wraps for compression and to secure bandages.  Patient to notify this office immediately to have a new dressing applied.  Wound care instructions given to patient  Return to clinic next week - will see patient during office visit with Dr. Dunn on 2/11/2022 for weekly unna boot dressing change

## 2022-02-03 NOTE — PATIENT INSTRUCTIONS
Patient instructed to elevate legs when sitting for prolonged periods of time  Patient was warn not to get dressing wet and to use a cast cover for showering.  Should dressing become wet, patient is to remove by unrolling each layer from the top going down; shower with a mild soap, pat dry and place a wet-to-dry dressing over the wound cover with gauze, roll gauze and secure with paper tape.  Apply two ace wraps for compression and to secure bandages.  Patient to notify this office immediately to have a new dressing applied.  Wound care instructions given to patient  Return to clinic next week - will see patient during office visit with Dr. Dunn on 2/11/2022 for weekly unna boot dressing change

## 2022-02-11 ENCOUNTER — PATIENT MESSAGE (OUTPATIENT)
Dept: INTERNAL MEDICINE | Facility: CLINIC | Age: 72
End: 2022-02-11
Payer: MEDICARE

## 2022-02-11 ENCOUNTER — OFFICE VISIT (OUTPATIENT)
Dept: VASCULAR SURGERY | Facility: CLINIC | Age: 72
End: 2022-02-11
Attending: SURGERY
Payer: MEDICARE

## 2022-02-11 ENCOUNTER — PATIENT MESSAGE (OUTPATIENT)
Dept: VASCULAR SURGERY | Facility: CLINIC | Age: 72
End: 2022-02-11

## 2022-02-11 VITALS
HEIGHT: 72 IN | DIASTOLIC BLOOD PRESSURE: 78 MMHG | SYSTOLIC BLOOD PRESSURE: 170 MMHG | TEMPERATURE: 99 F | HEART RATE: 77 BPM | WEIGHT: 293 LBS | BODY MASS INDEX: 39.68 KG/M2

## 2022-02-11 DIAGNOSIS — L88 PYODERMA GANGRENOSUM: Primary | ICD-10-CM

## 2022-02-11 DIAGNOSIS — M79.3 LIPODERMATOSCLEROSIS OF BOTH LOWER EXTREMITIES: ICD-10-CM

## 2022-02-11 PROCEDURE — 1101F PT FALLS ASSESS-DOCD LE1/YR: CPT | Mod: CPTII,S$GLB,, | Performed by: SURGERY

## 2022-02-11 PROCEDURE — 99999 PR PBB SHADOW E&M-EST. PATIENT-LVL IV: ICD-10-PCS | Mod: PBBFAC,,, | Performed by: SURGERY

## 2022-02-11 PROCEDURE — 3008F PR BODY MASS INDEX (BMI) DOCUMENTED: ICD-10-PCS | Mod: CPTII,S$GLB,, | Performed by: SURGERY

## 2022-02-11 PROCEDURE — 4010F PR ACE/ARB THEARPY RXD/TAKEN: ICD-10-PCS | Mod: CPTII,S$GLB,, | Performed by: SURGERY

## 2022-02-11 PROCEDURE — 3077F PR MOST RECENT SYSTOLIC BLOOD PRESSURE >= 140 MM HG: ICD-10-PCS | Mod: CPTII,S$GLB,, | Performed by: SURGERY

## 2022-02-11 PROCEDURE — 3078F DIAST BP <80 MM HG: CPT | Mod: CPTII,S$GLB,, | Performed by: SURGERY

## 2022-02-11 PROCEDURE — 1125F PR PAIN SEVERITY QUANTIFIED, PAIN PRESENT: ICD-10-PCS | Mod: CPTII,S$GLB,, | Performed by: SURGERY

## 2022-02-11 PROCEDURE — 1159F MED LIST DOCD IN RCRD: CPT | Mod: CPTII,S$GLB,, | Performed by: SURGERY

## 2022-02-11 PROCEDURE — 3078F PR MOST RECENT DIASTOLIC BLOOD PRESSURE < 80 MM HG: ICD-10-PCS | Mod: CPTII,S$GLB,, | Performed by: SURGERY

## 2022-02-11 PROCEDURE — 99213 PR OFFICE/OUTPT VISIT, EST, LEVL III, 20-29 MIN: ICD-10-PCS | Mod: S$GLB,,, | Performed by: SURGERY

## 2022-02-11 PROCEDURE — 1160F RVW MEDS BY RX/DR IN RCRD: CPT | Mod: CPTII,S$GLB,, | Performed by: SURGERY

## 2022-02-11 PROCEDURE — 99999 PR PBB SHADOW E&M-EST. PATIENT-LVL IV: CPT | Mod: PBBFAC,,, | Performed by: SURGERY

## 2022-02-11 PROCEDURE — 99213 OFFICE O/P EST LOW 20 MIN: CPT | Mod: S$GLB,,, | Performed by: SURGERY

## 2022-02-11 PROCEDURE — 1125F AMNT PAIN NOTED PAIN PRSNT: CPT | Mod: CPTII,S$GLB,, | Performed by: SURGERY

## 2022-02-11 PROCEDURE — 3288F FALL RISK ASSESSMENT DOCD: CPT | Mod: CPTII,S$GLB,, | Performed by: SURGERY

## 2022-02-11 PROCEDURE — 1101F PR PT FALLS ASSESS DOC 0-1 FALLS W/OUT INJ PAST YR: ICD-10-PCS | Mod: CPTII,S$GLB,, | Performed by: SURGERY

## 2022-02-11 PROCEDURE — 1160F PR REVIEW ALL MEDS BY PRESCRIBER/CLIN PHARMACIST DOCUMENTED: ICD-10-PCS | Mod: CPTII,S$GLB,, | Performed by: SURGERY

## 2022-02-11 PROCEDURE — 4010F ACE/ARB THERAPY RXD/TAKEN: CPT | Mod: CPTII,S$GLB,, | Performed by: SURGERY

## 2022-02-11 PROCEDURE — 3008F BODY MASS INDEX DOCD: CPT | Mod: CPTII,S$GLB,, | Performed by: SURGERY

## 2022-02-11 PROCEDURE — 3288F PR FALLS RISK ASSESSMENT DOCUMENTED: ICD-10-PCS | Mod: CPTII,S$GLB,, | Performed by: SURGERY

## 2022-02-11 PROCEDURE — 3077F SYST BP >= 140 MM HG: CPT | Mod: CPTII,S$GLB,, | Performed by: SURGERY

## 2022-02-11 PROCEDURE — 1159F PR MEDICATION LIST DOCUMENTED IN MEDICAL RECORD: ICD-10-PCS | Mod: CPTII,S$GLB,, | Performed by: SURGERY

## 2022-02-11 NOTE — PROGRESS NOTES
VASCULAR SURGERY CLINIC NOTE    Patient ID: Ana Patel is a 72 y.o. female.    I. HISTORY     Chief Complaint: Delayed healing right leg ulceration    HPI: Ana Patel is a 72 y.o. female who is here today for evaluation of anterior right leg ulcerations. Symptoms include persistent 8/10 pain localized to the anterior shin, worse at night, characterized as a sharp aching cramp. Patient notes that it initially began as a rash to bilateral legs, although the left leg symptomatically resolved while the right continued to worsen. Pain symptoms began 5 months ago, and have progressively worsened. She relays not being able to sleep due to pain, no significant relief with tylenol, and found minor alleviation 5/10 pain with pain medication. Patient presented with left leg in compression stocking and right leg with unna boot and calcium alginate dressings. Patient has no history of DVT. Symptoms limit patient's functional status and daily activities. She denies any history of inflammatory bowel disease. She does say that she was diagnosed with arthritis as a child but this apparently resolved.    Migraine with aura: No  PFO/ASD/right to left shunt:  no  Pregnant: No  Breastfeeding:  No  MI: no  Stroke: No  Seizure Disorder: No      Past Medical History:   Diagnosis Date    Eczema     Hyperlipidemia     Hypertension     Necrobiosis lipoidica     Obesity     Osteoarthritis     Varicose vein of leg         Past Surgical History:   Procedure Laterality Date    TONSILLECTOMY, ADENOIDECTOMY         Social History     Occupational History     Employer: [employer]   Tobacco Use    Smoking status: Current Every Day Smoker     Packs/day: 0.50     Years: 30.00     Pack years: 15.00     Types: Cigarettes    Smokeless tobacco: Never Used   Substance and Sexual Activity    Alcohol use: No     Alcohol/week: 0.0 standard drinks     Types: 1 Standard drinks or equivalent per week     Comment: maybe 1-2  yearly    Drug use: No    Sexual activity: Yes     Partners: Male         Current Outpatient Medications:     acetaminophen (TYLENOL) 500 mg Cap, , Disp: , Rfl:     amLODIPine (NORVASC) 5 MG tablet, TAKE 1 TABLET ONE TIME DAILY, Disp: 90 tablet, Rfl: 11    benzocaine 20 % Liqd, , Disp: , Rfl:     betamethasone dipropionate 0.05 % cream, AAA BID x 1-2 wks then prn flares only. Avoid face, armpits, groin., Disp: 45 g, Rfl: 1    collagenase (SANTYL) ointment, Apply thickly to wound base once daily after cleansing., Disp: 50 g, Rfl: 1    doxycycline (VIBRAMYCIN) 100 MG Cap, Take 1 capsule (100 mg total) by mouth 2 (two) times a day. Take with food; do not lay down 1-2 hours after taking, caution in sun., Disp: 30 capsule, Rfl: 3    erythromycin (ROMYCIN) ophthalmic ointment, Place into the left eye every 6 (six) hours., Disp: 3.5 g, Rfl: 1    ezetimibe (ZETIA) 10 mg tablet, Take 1 tablet (10 mg total) by mouth once daily., Disp: 90 tablet, Rfl: 3    fexofenadine (ALLEGRA) 180 MG tablet, Take 180 mg by mouth daily as needed., Disp: , Rfl:     FLUZONE HIGH-DOSE 2018-19, PF, 180 mcg/0.5 mL vaccine, ADM 0.5ML IM UTD, Disp: , Rfl: 0    furosemide (LASIX) 20 MG tablet, TAKE 1 TABLET ONE TIME DAILY, Disp: 90 tablet, Rfl: 11    lisinopriL (PRINIVIL,ZESTRIL) 20 MG tablet, Take 1 tablet (20 mg total) by mouth once daily., Disp: 90 tablet, Rfl: 11    meloxicam (MOBIC) 7.5 MG tablet, TAKE 1 TABLET EVERY DAY, Disp: 90 tablet, Rfl: 1    multivitamin capsule, Take 1 capsule by mouth once daily., Disp: , Rfl:     omega 3-dha-epa-fish oil (FISH OIL) 1,000 mg (120 mg-180 mg) Cap, Take 1 capsule by mouth 2 (two) times daily., Disp: , Rfl:     pentoxifylline (TRENTAL) 400 mg TbSR, 1 po tid with meals, Disp: 90 tablet, Rfl: 2    traMADoL (ULTRAM) 50 mg tablet, Take 1 tablet (50 mg total) by mouth every 12 (twelve) hours as needed for Pain. Tramadol will taken as needed for pain instead of hydrocodone, Disp: 40 tablet,  Rfl: 3    triamcinolone acetonide 0.1% (KENALOG) 0.1 % ointment, Apply topically 2 (two) times daily as needed (rash at legs)., Disp: 454 g, Rfl: 3    triamcinolone acetonide 0.5% (KENALOG) 0.5 % Crea, Apply topically 2 (two) times daily., Disp: 454 g, Rfl: 0    clobetasol 0.05% (TEMOVATE) 0.05 % Oint, Apply topically 2 (two) times daily. 30 mg tube x 1. Apply 2 times daily to intact skin around ulcerations, avoid wound base., Disp: 30 g, Rfl: 0    predniSONE (DELTASONE) 20 MG tablet, Take 3 tablets (60 mg total) by mouth once daily., Disp: 90 tablet, Rfl: 0    rosuvastatin (CRESTOR) 5 MG tablet, Take 5 mg by mouth once daily., Disp: , Rfl:     Review of Systems   Constitutional: Negative for chills, decreased appetite, fever, weight gain and weight loss.   HENT: Negative for ear pain and nosebleeds.    Eyes: Negative for discharge and pain.   Cardiovascular: Negative for chest pain and palpitations.   Respiratory: Negative for cough, shortness of breath and wheezing.    Endocrine: Negative for cold intolerance, heat intolerance and polyphagia.   Skin: Positive for color change, poor wound healing and rash.   Musculoskeletal: Negative for joint swelling and muscle cramps.   Gastrointestinal: Negative for abdominal pain, diarrhea, nausea and vomiting.   Genitourinary: Negative for dysuria and flank pain.   Neurological: Negative for numbness, paresthesias, seizures and sensory change.         II. PHYSICAL EXAM     Physical Exam  Vitals reviewed.   Constitutional:       General: She is in acute distress.      Appearance: She is obese. She is not ill-appearing, toxic-appearing or diaphoretic.   HENT:      Head: Normocephalic and atraumatic.      Nose: Nose normal.   Eyes:      General:         Right eye: No discharge.         Left eye: No discharge.   Cardiovascular:      Rate and Rhythm: Normal rate.      Pulses: Intact distal pulses.           Dorsalis pedis pulses are 2+ on the right side and 2+ on the left  side.        Posterior tibial pulses are 2+ on the right side and 2+ on the left side.      Heart sounds: Normal heart sounds.   Pulmonary:      Effort: Pulmonary effort is normal. No respiratory distress.      Breath sounds: No wheezing.   Abdominal:      General: Abdomen is flat. There is no distension.      Tenderness: There is no abdominal tenderness.   Musculoskeletal:      Right lower leg: Edema (moderate) present.      Left lower leg: Edema (Minimal-controlled) present.   Skin:     General: Skin is warm and dry.      Capillary Refill: Capillary refill takes 2 to 3 seconds.      Findings: Erythema and lesion (R anterior shin) present.      Comments: Right leg ulcerations:   - Skin warm to touch, same as contralateral   - Mild-Moderate serous drainage noted to full thickness ulcerations and dressings. No: active drainage, purulence, mal odor. Wound base 50% fibrotic, 50% granular, 0% necrotic.    Neurological:      Mental Status: She is alert and oriented to person, place, and time.           Photo from 1/20/22    Imaging Results: (I have personally reviewed the images/studies and provided my interpretation below)    BLE Venous Duplex ultrasound 12/23/21 Impression: No evidence of L or R LE DVT    01/26/22 CTA runoff ABD PED: There is complete occlusion of the proximal right SFA with distal reconstitution.  Probable bilateral three-vessel runoff.    III. ASSESSMENT & PLAN (MEDICAL DECISION MAKING)     Encounter Diagnosis   Name Primary?    Pyoderma gangrenosum Yes         Assessment/Diagnosis and Plan:  72 y.o. female referred for evaluation of delayed healing painful right leg ulcerations. Although she has PAD her AJ is >0.5 and wounds are not suggestive of ischemic ulcerations. She does not have any of the typical stigmatta of venous insufficiency (no varicose veins, minimal edema).    These ulcers are causing severe pain and have not improved at all with Unna boot therapy. The appearance of the wounds and  fact that they have not improve with Unna boot is suggestive of pyoderma gangrenosum. Will treat empirically with prednisone and refer to rheumatology for further management of medical treatment.    -Referral to follow up with rheumatology within a week or sooner for continued medical management and recommendations   -60 mg prednisone Rx provided- will defer future adjustments/tapering to rheumatology-- appreciate their assistance   -Clobestasol 0.05% topical ointment applied to periwound area BID x 2 weeks, avoid wound base   -Left LE: Compression with 20-30mmHg Rx stocking left, elevation  -Right LE: Hydrafera blue or Calcium alginate, kerlix secured with ace mild-moderate compression   -Elevation   -Exercise regularly   -RTC PRN for re- evaluation      REUBEN Dunn II, MD, ROXYVI  Jamel Johnson DPM PGY1   Vascular Surgery

## 2022-02-12 RX ORDER — PREDNISONE 20 MG/1
60 TABLET ORAL DAILY
Qty: 90 TABLET | Refills: 0 | Status: SHIPPED | OUTPATIENT
Start: 2022-02-12 | End: 2022-03-11

## 2022-02-12 RX ORDER — PREDNISONE 20 MG/1
20 TABLET ORAL 2 TIMES DAILY
Qty: 28 TABLET | Refills: 0 | Status: CANCELLED | OUTPATIENT
Start: 2022-02-12 | End: 2022-02-26

## 2022-02-12 RX ORDER — CLOBETASOL PROPIONATE 0.5 MG/G
OINTMENT TOPICAL 2 TIMES DAILY
Qty: 30 G | Refills: 0 | Status: SHIPPED | OUTPATIENT
Start: 2022-02-12 | End: 2023-11-03

## 2022-02-12 RX ORDER — OXYCODONE AND ACETAMINOPHEN 5; 325 MG/1; MG/1
1 TABLET ORAL EVERY 6 HOURS PRN
Qty: 24 TABLET | Refills: 0 | Status: CANCELLED | OUTPATIENT
Start: 2022-02-12 | End: 2022-02-18

## 2022-02-15 ENCOUNTER — TELEPHONE (OUTPATIENT)
Dept: SMOKING CESSATION | Facility: CLINIC | Age: 72
End: 2022-02-15
Payer: MEDICARE

## 2022-02-15 NOTE — TELEPHONE ENCOUNTER
Sm[facility]essation [facility]- called patient in regard to canceled intake clinic appointment on 2/10/22 @ 1 pm. Spoke to patient and she is not ready to reschedule at this time dealing with other medical issues and lots of appointments. She will call when ready and reschedule intake for Smoking Cessation.   Sugey Ortez, [facility]  [phone] or [phone].

## 2022-02-18 ENCOUNTER — TELEPHONE (OUTPATIENT)
Dept: RHEUMATOLOGY | Facility: CLINIC | Age: 72
End: 2022-02-18
Payer: MEDICARE

## 2022-02-18 DIAGNOSIS — L92.1 NECROBIOSIS LIPOIDICA: Primary | ICD-10-CM

## 2022-02-18 NOTE — TELEPHONE ENCOUNTER
"----- Message from REUBEN Dunn II, MD sent at 2/12/2022 12:33 PM CST -----  Regarding: Pyoderma Gangrenosum  Richie Cannon,    This patient was referred to my clinic yesterday for for suspected venous stasis ulcerations. Her ulcers have not improved despite 3 weeks of Unna boot therapy with woundcare. She states that the ulcers are extremely painful which is not characteristic for venous stasis ulcerations. Her history combined with ulcer appearance made me think that this was likely pyoderma gangrenosum. I prescribed 60mg PO Prednisone and topical clobetasol to the wound edges and placed a referral to rheumatology. She has no other history of auto-immune disorders aside from reported history of "arthritis as a child". Any assistance in getting her into rheumatology clinic some time in the next couple weeks would be greatly appreciated. Thanks.    -Antelmo Dunn    "

## 2022-02-21 ENCOUNTER — HOSPITAL ENCOUNTER (OUTPATIENT)
Dept: RADIOLOGY | Facility: HOSPITAL | Age: 72
Discharge: HOME | End: 2022-02-21
Attending: INTERNAL MEDICINE
Payer: MEDICARE

## 2022-02-21 ENCOUNTER — OFFICE VISIT (OUTPATIENT)
Dept: RHEUMATOLOGY | Facility: CLINIC | Age: 72
End: 2022-02-21
Attending: SURGERY
Payer: MEDICARE

## 2022-02-21 VITALS
BODY MASS INDEX: 37.93 KG/M2 | HEART RATE: 71 BPM | WEIGHT: 280 LBS | SYSTOLIC BLOOD PRESSURE: 148 MMHG | HEIGHT: 72 IN | DIASTOLIC BLOOD PRESSURE: 80 MMHG

## 2022-02-21 DIAGNOSIS — L92.1 NECROBIOSIS LIPOIDICA: ICD-10-CM

## 2022-02-21 DIAGNOSIS — M17.0 PRIMARY OSTEOARTHRITIS OF BOTH KNEES: ICD-10-CM

## 2022-02-21 DIAGNOSIS — L97.911 ULCER OF RIGHT LOWER EXTREMITY, LIMITED TO BREAKDOWN OF SKIN: ICD-10-CM

## 2022-02-21 DIAGNOSIS — L88 PYODERMA GANGRENOSUM: ICD-10-CM

## 2022-02-21 DIAGNOSIS — L97.911 ULCER OF RIGHT LOWER EXTREMITY, LIMITED TO BREAKDOWN OF SKIN: Primary | ICD-10-CM

## 2022-02-21 PROCEDURE — 1125F PR PAIN SEVERITY QUANTIFIED, PAIN PRESENT: ICD-10-PCS | Mod: HCNC,CPTII,S$GLB, | Performed by: INTERNAL MEDICINE

## 2022-02-21 PROCEDURE — 3079F PR MOST RECENT DIASTOLIC BLOOD PRESSURE 80-89 MM HG: ICD-10-PCS | Mod: HCNC,CPTII,S$GLB, | Performed by: INTERNAL MEDICINE

## 2022-02-21 PROCEDURE — 1159F MED LIST DOCD IN RCRD: CPT | Mod: HCNC,CPTII,S$GLB, | Performed by: INTERNAL MEDICINE

## 2022-02-21 PROCEDURE — 71046 X-RAY EXAM CHEST 2 VIEWS: CPT | Mod: 26,HCNC,, | Performed by: RADIOLOGY

## 2022-02-21 PROCEDURE — 3008F PR BODY MASS INDEX (BMI) DOCUMENTED: ICD-10-PCS | Mod: HCNC,CPTII,S$GLB, | Performed by: INTERNAL MEDICINE

## 2022-02-21 PROCEDURE — 3079F DIAST BP 80-89 MM HG: CPT | Mod: HCNC,CPTII,S$GLB, | Performed by: INTERNAL MEDICINE

## 2022-02-21 PROCEDURE — 1125F AMNT PAIN NOTED PAIN PRSNT: CPT | Mod: HCNC,CPTII,S$GLB, | Performed by: INTERNAL MEDICINE

## 2022-02-21 PROCEDURE — 3077F PR MOST RECENT SYSTOLIC BLOOD PRESSURE >= 140 MM HG: ICD-10-PCS | Mod: HCNC,CPTII,S$GLB, | Performed by: INTERNAL MEDICINE

## 2022-02-21 PROCEDURE — 3008F BODY MASS INDEX DOCD: CPT | Mod: HCNC,CPTII,S$GLB, | Performed by: INTERNAL MEDICINE

## 2022-02-21 PROCEDURE — 1159F PR MEDICATION LIST DOCUMENTED IN MEDICAL RECORD: ICD-10-PCS | Mod: HCNC,CPTII,S$GLB, | Performed by: INTERNAL MEDICINE

## 2022-02-21 PROCEDURE — 99499 RISK ADDL DX/OHS AUDIT: ICD-10-PCS | Mod: S$GLB,,, | Performed by: INTERNAL MEDICINE

## 2022-02-21 PROCEDURE — 3077F SYST BP >= 140 MM HG: CPT | Mod: HCNC,CPTII,S$GLB, | Performed by: INTERNAL MEDICINE

## 2022-02-21 PROCEDURE — 1160F PR REVIEW ALL MEDS BY PRESCRIBER/CLIN PHARMACIST DOCUMENTED: ICD-10-PCS | Mod: HCNC,CPTII,S$GLB, | Performed by: INTERNAL MEDICINE

## 2022-02-21 PROCEDURE — 71046 X-RAY EXAM CHEST 2 VIEWS: CPT | Mod: TC,HCNC

## 2022-02-21 PROCEDURE — 99204 PR OFFICE/OUTPT VISIT, NEW, LEVL IV, 45-59 MIN: ICD-10-PCS | Mod: HCNC,S$GLB,, | Performed by: INTERNAL MEDICINE

## 2022-02-21 PROCEDURE — 4010F PR ACE/ARB THEARPY RXD/TAKEN: ICD-10-PCS | Mod: HCNC,CPTII,S$GLB, | Performed by: INTERNAL MEDICINE

## 2022-02-21 PROCEDURE — 99999 PR PBB SHADOW E&M-EST. PATIENT-LVL V: ICD-10-PCS | Mod: PBBFAC,HCNC,, | Performed by: INTERNAL MEDICINE

## 2022-02-21 PROCEDURE — 1160F RVW MEDS BY RX/DR IN RCRD: CPT | Mod: HCNC,CPTII,S$GLB, | Performed by: INTERNAL MEDICINE

## 2022-02-21 PROCEDURE — 4010F ACE/ARB THERAPY RXD/TAKEN: CPT | Mod: HCNC,CPTII,S$GLB, | Performed by: INTERNAL MEDICINE

## 2022-02-21 PROCEDURE — 99999 PR PBB SHADOW E&M-EST. PATIENT-LVL V: CPT | Mod: PBBFAC,HCNC,, | Performed by: INTERNAL MEDICINE

## 2022-02-21 PROCEDURE — 71046 XR CHEST PA AND LATERAL: ICD-10-PCS | Mod: 26,HCNC,, | Performed by: RADIOLOGY

## 2022-02-21 PROCEDURE — 99499 UNLISTED E&M SERVICE: CPT | Mod: S$GLB,,, | Performed by: INTERNAL MEDICINE

## 2022-02-21 PROCEDURE — 99204 OFFICE O/P NEW MOD 45 MIN: CPT | Mod: HCNC,S$GLB,, | Performed by: INTERNAL MEDICINE

## 2022-02-22 NOTE — PROGRESS NOTES
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  She remembers it in the lower extremities.  She had trouble walking.  It may have lasted 6 months.  She was treated with unknown medication.  She has had no recurrence of the problem.    She has had a rash on her leg since she was 18 years old.  She had a previous biopsy which revealed necrobiosis lipoidica.  She has also been clinically the diagnosed as having lipodermatosclerosis.  She developed ulcers on her right leg in December.  She has been going to Wound Care.  She had had at least 1 or 2 prior episodes of ulcers on her legs.  She had been treated with steroids in the past for the ulcers.  She had only been on topical medications for the rash.  She has been going to wound care.  She was seen by vascular surgery.  She was diagnosed as having pyoderma gangrenosum.  She was placed on high-dose prednisone which she has taken for the past 2 weeks.  This has been helping her ulcers.  Her  is still doing wound care at home.  I am asked to see her to rule out underlying connective tissue disease.    She has chronic pain in her knees.  She had had previous swelling in the knee.  She has been diagnosed as having osteoarthritis.  She was evaluated by Orthopedics and recommended for joint replacement surgery, after her wounds heal.  She has been taking tramadol for pain.    She had COVID 3 weeks ago.  She just had URI symptoms.  She had no evidence of pneumonia.    She has had no unexplained fevers.  She denies any headache.  She has easy bruising on the arm.  She has also had occasional rash on her arm but no ulcers.  She has no conjunctivitis, oral ulcers, dry eye or mouth, Raynaud's phenomena, pleurisy, vaginal discharge or ulcers, chronic or bloody diarrhea.  She has no numbness or tingling.  She has no thrombophlebitis.  She is adopted  so does not know her family history.    Systems review:  General:  Weight has been stable  Respiratory:  No chronic cough or sputum production  GI:  No abdominal pain or peptic ulcer disease.  No liver problems.  :  Has urinary incontinence    Physical examination:  Skin:  She has erythema of her forearm.  She has multiple ecchymoses on her arms.  She has mild erythema of both legs.  She has superficial ulcers on the right leg.  These are improved compared to her previous pictures.  ENT:  Adequate tears in saliva.  No conjunctivitis or oral ulcers.  Chest:  Clear to auscultation and percussion  Cardiac:  No murmurs, gallops, rubs  Abdomen:  No organomegaly or masses.  No tenderness to palpation  Extremities:  No pedal edema  Musculoskeletal:  She has bony hypertrophy of both knees.  She has no soft tissue swelling, erythema, or increased warmth.  The knees have tenderness to palpation and decreased range of motion.  She has no synovitis on examination of other peripheral joints.    Assessment:  1. She has chronic skin rash diagnosed as NLD.  There is also concerned that she may have pyoderma gangrenosum.  Both of these conditions are associated with skin ulcers.  The skin ulcers of either these conditions can respond to high-dose steroids.  She has not had a recent biopsy  2. Osteoarthritis of the knees    Plans:  1. Laboratory studies and chest x-ray ordered.  2. Resume wound care.  She was seen by the Podiatry nurse during this appointment who set up the follow-up in helped me redress the wounds.  3. Return to see me in 1 month.  4. She may require long-term DMARD therapy.  This will depend on the final diagnosis.

## 2022-02-25 ENCOUNTER — PATIENT OUTREACH (OUTPATIENT)
Dept: ADMINISTRATIVE | Facility: OTHER | Age: 72
End: 2022-02-25
Payer: MEDICARE

## 2022-02-25 ENCOUNTER — PATIENT MESSAGE (OUTPATIENT)
Dept: RHEUMATOLOGY | Facility: CLINIC | Age: 72
End: 2022-02-25
Payer: MEDICARE

## 2022-02-25 DIAGNOSIS — E11.9 TYPE 2 DIABETES MELLITUS WITHOUT COMPLICATION, UNSPECIFIED WHETHER LONG TERM INSULIN USE: Primary | ICD-10-CM

## 2022-02-25 NOTE — PROGRESS NOTES
LINKS immunization registry updated  Care Everywhere updated  Health Maintenance updated  DIS/Chart reviewed for overdue Proactive [facility] Encounters ([facility]) health maintenance testing (CRS, Breast Ca, Diabetic Eye Exam)   Orders entered: diabetic eye screening photo  Portal message sent to patient with scheduling link for mammogram 1/18/22

## 2022-03-02 ENCOUNTER — LAB VISIT (OUTPATIENT)
Dept: LAB | Facility: HOSPITAL | Age: 72
End: 2022-03-02
Attending: SURGERY
Payer: MEDICARE

## 2022-03-02 ENCOUNTER — OFFICE VISIT (OUTPATIENT)
Dept: PODIATRY | Facility: CLINIC | Age: 72
End: 2022-03-02
Payer: MEDICARE

## 2022-03-02 ENCOUNTER — CLINICAL SUPPORT (OUTPATIENT)
Dept: WOUND CARE | Facility: CLINIC | Age: 72
End: 2022-03-02
Payer: MEDICARE

## 2022-03-02 VITALS
TEMPERATURE: 97 F | DIASTOLIC BLOOD PRESSURE: 92 MMHG | HEIGHT: 72 IN | HEART RATE: 99 BPM | SYSTOLIC BLOOD PRESSURE: 196 MMHG | WEIGHT: 280 LBS | BODY MASS INDEX: 37.93 KG/M2

## 2022-03-02 VITALS
SYSTOLIC BLOOD PRESSURE: 168 MMHG | HEART RATE: 82 BPM | WEIGHT: 280 LBS | HEIGHT: 72 IN | BODY MASS INDEX: 37.93 KG/M2 | DIASTOLIC BLOOD PRESSURE: 86 MMHG | RESPIRATION RATE: 18 BRPM

## 2022-03-02 DIAGNOSIS — Z01.818 PRE-OP EVALUATION: Primary | ICD-10-CM

## 2022-03-02 DIAGNOSIS — Z01.818 PRE-OP EVALUATION: ICD-10-CM

## 2022-03-02 DIAGNOSIS — I73.9 PERIPHERAL VASCULAR DISEASE, UNSPECIFIED: ICD-10-CM

## 2022-03-02 DIAGNOSIS — I73.9 PVD (PERIPHERAL VASCULAR DISEASE): Primary | ICD-10-CM

## 2022-03-02 DIAGNOSIS — L88 PYODERMA GANGRENOSUM: ICD-10-CM

## 2022-03-02 DIAGNOSIS — B35.1 ONYCHOMYCOSIS DUE TO DERMATOPHYTE: Primary | ICD-10-CM

## 2022-03-02 LAB
ANION GAP SERPL CALC-SCNC: 9 MMOL/L (ref 8–16)
ANISOCYTOSIS BLD QL SMEAR: SLIGHT
BASOPHILS # BLD AUTO: ABNORMAL K/UL (ref 0–0.2)
BASOPHILS NFR BLD: 0 % (ref 0–1.9)
BUN SERPL-MCNC: 30 MG/DL (ref 8–23)
CALCIUM SERPL-MCNC: 9 MG/DL (ref 8.7–10.5)
CHLORIDE SERPL-SCNC: 100 MMOL/L (ref 95–110)
CO2 SERPL-SCNC: 26 MMOL/L (ref 23–29)
CREAT SERPL-MCNC: 0.7 MG/DL (ref 0.5–1.4)
DIFFERENTIAL METHOD: ABNORMAL
EOSINOPHIL # BLD AUTO: ABNORMAL K/UL (ref 0–0.5)
EOSINOPHIL NFR BLD: 0 % (ref 0–8)
ERYTHROCYTE [DISTWIDTH] IN BLOOD BY AUTOMATED COUNT: 16.3 % (ref 11.5–14.5)
EST. GFR  (AFRICAN AMERICAN): >60 ML/MIN/1.73 M^2
EST. GFR  (NON AFRICAN AMERICAN): >60 ML/MIN/1.73 M^2
GIANT PLATELETS BLD QL SMEAR: PRESENT
GLUCOSE SERPL-MCNC: 154 MG/DL (ref 70–110)
HCT VFR BLD AUTO: 43.3 % (ref 37–48.5)
HGB BLD-MCNC: 13.9 G/DL (ref 12–16)
HYPOCHROMIA BLD QL SMEAR: ABNORMAL
IMM GRANULOCYTES # BLD AUTO: ABNORMAL K/UL (ref 0–0.04)
IMM GRANULOCYTES NFR BLD AUTO: ABNORMAL % (ref 0–0.5)
LYMPHOCYTES # BLD AUTO: ABNORMAL K/UL (ref 1–4.8)
LYMPHOCYTES NFR BLD: 11 % (ref 18–48)
MCH RBC QN AUTO: 32.5 PG (ref 27–31)
MCHC RBC AUTO-ENTMCNC: 32.1 G/DL (ref 32–36)
MCV RBC AUTO: 101 FL (ref 82–98)
MONOCYTES # BLD AUTO: ABNORMAL K/UL (ref 0.3–1)
MONOCYTES NFR BLD: 5 % (ref 4–15)
NEUTROPHILS NFR BLD: 82 % (ref 38–73)
NEUTS BAND NFR BLD MANUAL: 2 %
NRBC BLD-RTO: 0 /100 WBC
OVALOCYTES BLD QL SMEAR: ABNORMAL
PLATELET # BLD AUTO: 143 K/UL (ref 150–450)
PLATELET BLD QL SMEAR: ABNORMAL
PMV BLD AUTO: 12.4 FL (ref 9.2–12.9)
POIKILOCYTOSIS BLD QL SMEAR: SLIGHT
POLYCHROMASIA BLD QL SMEAR: ABNORMAL
POTASSIUM SERPL-SCNC: 5 MMOL/L (ref 3.5–5.1)
RBC # BLD AUTO: 4.28 M/UL (ref 4–5.4)
SODIUM SERPL-SCNC: 135 MMOL/L (ref 136–145)
SPHEROCYTES BLD QL SMEAR: ABNORMAL
WBC # BLD AUTO: 9.66 K/UL (ref 3.9–12.7)

## 2022-03-02 PROCEDURE — 1126F PR PAIN SEVERITY QUANTIFIED, NO PAIN PRESENT: ICD-10-PCS | Mod: CPTII,S$GLB,, | Performed by: PODIATRIST

## 2022-03-02 PROCEDURE — 36415 COLL VENOUS BLD VENIPUNCTURE: CPT | Mod: HCNC | Performed by: SURGERY

## 2022-03-02 PROCEDURE — 99999 PR PBB SHADOW E&M-EST. PATIENT-LVL V: CPT | Mod: PBBFAC,HCNC,,

## 2022-03-02 PROCEDURE — 3008F PR BODY MASS INDEX (BMI) DOCUMENTED: ICD-10-PCS | Mod: CPTII,S$GLB,, | Performed by: PODIATRIST

## 2022-03-02 PROCEDURE — 3077F PR MOST RECENT SYSTOLIC BLOOD PRESSURE >= 140 MM HG: ICD-10-PCS | Mod: CPTII,S$GLB,, | Performed by: PODIATRIST

## 2022-03-02 PROCEDURE — 17999 UNLISTD PX SKN MUC MEMB SUBQ: CPT | Mod: CSM,S$GLB,, | Performed by: PODIATRIST

## 2022-03-02 PROCEDURE — 80048 BASIC METABOLIC PNL TOTAL CA: CPT | Mod: HCNC | Performed by: SURGERY

## 2022-03-02 PROCEDURE — 4010F ACE/ARB THERAPY RXD/TAKEN: CPT | Mod: CPTII,S$GLB,, | Performed by: PODIATRIST

## 2022-03-02 PROCEDURE — 99999 PR PBB SHADOW E&M-EST. PATIENT-LVL IV: CPT | Mod: PBBFAC,,, | Performed by: PODIATRIST

## 2022-03-02 PROCEDURE — 99999 PR PBB SHADOW E&M-EST. PATIENT-LVL IV: ICD-10-PCS | Mod: PBBFAC,,, | Performed by: PODIATRIST

## 2022-03-02 PROCEDURE — 85007 BL SMEAR W/DIFF WBC COUNT: CPT | Mod: HCNC | Performed by: SURGERY

## 2022-03-02 PROCEDURE — 1126F AMNT PAIN NOTED NONE PRSNT: CPT | Mod: CPTII,S$GLB,, | Performed by: PODIATRIST

## 2022-03-02 PROCEDURE — 99202 OFFICE O/P NEW SF 15 MIN: CPT | Mod: HCNC,S$GLB,, | Performed by: SURGERY

## 2022-03-02 PROCEDURE — 99499 UNLISTED E&M SERVICE: CPT | Mod: ,,, | Performed by: PODIATRIST

## 2022-03-02 PROCEDURE — 4010F PR ACE/ARB THEARPY RXD/TAKEN: ICD-10-PCS | Mod: CPTII,S$GLB,, | Performed by: PODIATRIST

## 2022-03-02 PROCEDURE — 85027 COMPLETE CBC AUTOMATED: CPT | Mod: HCNC | Performed by: SURGERY

## 2022-03-02 PROCEDURE — 99999 PR PBB SHADOW E&M-EST. PATIENT-LVL V: ICD-10-PCS | Mod: PBBFAC,HCNC,,

## 2022-03-02 PROCEDURE — 99499 NO LOS: ICD-10-PCS | Mod: ,,, | Performed by: PODIATRIST

## 2022-03-02 PROCEDURE — 3008F BODY MASS INDEX DOCD: CPT | Mod: CPTII,S$GLB,, | Performed by: PODIATRIST

## 2022-03-02 PROCEDURE — 3077F SYST BP >= 140 MM HG: CPT | Mod: CPTII,S$GLB,, | Performed by: PODIATRIST

## 2022-03-02 PROCEDURE — 99202 PR OFFICE/OUTPT VISIT, NEW, LEVL II, 15-29 MIN: ICD-10-PCS | Mod: HCNC,S$GLB,, | Performed by: SURGERY

## 2022-03-02 PROCEDURE — 3079F DIAST BP 80-89 MM HG: CPT | Mod: CPTII,S$GLB,, | Performed by: PODIATRIST

## 2022-03-02 PROCEDURE — 3079F PR MOST RECENT DIASTOLIC BLOOD PRESSURE 80-89 MM HG: ICD-10-PCS | Mod: CPTII,S$GLB,, | Performed by: PODIATRIST

## 2022-03-02 PROCEDURE — 17999 PR NON-COVERED FOOT CARE: ICD-10-PCS | Mod: CSM,S$GLB,, | Performed by: PODIATRIST

## 2022-03-02 NOTE — PATIENT INSTRUCTIONS
Patient instructed to elevate legs when sitting for prolonged periods of time  Patient was warn not to get dressing wet and to use a cast cover for showering.  Should dressing become wet, patient is to remove dressing; shower with a mild soap, let water irrigate wound for 10 minutes while in shower, pat dry and place a wet-to-dry dressing over the wound cover with gauze, roll gauze and secure with paper tape.    Patient to notify this office immediately to have a new dressing applied.  Wound care instructions given to patient  Angiogram scheduled with Dr. Garrido 3/8/2022   Follow up visit to be scheduled after angiogram

## 2022-03-02 NOTE — PROGRESS NOTES
The patient was seen in Wound Care for follow-up.  I was called over to evaluate the wounds.  The patient may have applied a pyoderma or might have another issue, however she also has peripheral vascular disease.  This bite being noted that she had 2+ pulses I do not appreciate any pulses in the right foot.  I reviewed her CT which shows a totally occluded SFA with a short nubbin.  She may benefit from an endovascular reconstruction noted to her to improve blood flow.  I have explained this to the patient.  Explained to her the risks benefits of this procedure.  We will do a left common femoral puncture right SFA PTA and stent.  May have to go through her tibials and go retrograde if we can get antegrade.  The patient understands the procedure will plan for next week.  I have asked her to start a baby aspirin.

## 2022-03-02 NOTE — PROGRESS NOTES
Subjective:       Patient ID: Ana Patel is a 72 y.o. female.    Chief Complaint: Wound Check (Right lower extremity multiple open wounds )    HPI  Review of Systems    Objective:      Physical Exam    Assessment:       1. PVD (peripheral vascular disease)           Wound Right anterior;lower;proximal Leg (Active)        Pre-existing:    Primary Wound Type:    Side: Right   Orientation: anterior;lower;proximal   Location: Leg   Wound Number:    Ankle-Brachial Index:    Pulses:    Removal Indication and Assessment:    Wound Outcome:    (Retired) Wound Type:    (Retired) Wound Length (cm):    (Retired) Wound Width (cm):    (Retired) Depth (cm):    Wound Description (Comments):    Removal Indications:    Wound Image   03/02/22 1731   Dressing Appearance Dry;Intact;Clean 03/02/22 1731   Drainage Amount Small 03/02/22 1731   Drainage Characteristics/Odor Serosanguineous 03/02/22 1731   Yellow (%), Wound Tissue Color 100 % 03/02/22 1731   Periwound Area Intact;Dry;Pink;Swelling;Other (see comments) 03/02/22 1731   Wound Length (cm) 1.8 cm 03/02/22 1731   Wound Width (cm) 0.9 cm 03/02/22 1731   Wound Surface Area (cm^2) 1.62 cm^2 03/02/22 1731   Care Cleansed with:;Soap and water 03/02/22 1731   Dressing Applied;Honey;Foam;Absorptive Pad;Rolled gauze;Other (comment) 03/02/22 1731   Periwound Care Moisture barrier applied 03/02/22 1731            Wound Right anterior;lower;distal Leg (Active)        Pre-existing:    Primary Wound Type:    Side: Right   Orientation: anterior;lower;distal   Location: Leg   Wound Number:    Ankle-Brachial Index:    Pulses:    Removal Indication and Assessment:    Wound Outcome:    (Retired) Wound Type:    (Retired) Wound Length (cm):    (Retired) Wound Width (cm):    (Retired) Depth (cm):    Wound Description (Comments):    Removal Indications:    Dressing Appearance Dry;Intact;Clean 03/02/22 1731   Drainage Amount Small 03/02/22 1731   Drainage Characteristics/Odor Serosanguineous  03/02/22 1731   Yellow (%), Wound Tissue Color 100 % 03/02/22 1731   Periwound Area Intact;Dry;Pink;Swelling;Other (see comments) 03/02/22 1731   Wound Length (cm) 0.4 cm 03/02/22 1731   Wound Width (cm) 0.5 cm 03/02/22 1731   Wound Surface Area (cm^2) 0.2 cm^2 03/02/22 1731   Care Cleansed with:;Soap and water 03/02/22 1731   Dressing Applied;Honey;Foam;Absorptive Pad;Rolled gauze;Other (comment) 03/02/22 1731   Periwound Care Moisture barrier applied 03/02/22 1731            Wound Right lower;lateral;proximal Leg (Active)        Pre-existing:    Primary Wound Type:    Side: Right   Orientation: lower;lateral;proximal   Location: Leg   Wound Number:    Ankle-Brachial Index:    Pulses:    Removal Indication and Assessment:    Wound Outcome:    (Retired) Wound Type:    (Retired) Wound Length (cm):    (Retired) Wound Width (cm):    (Retired) Depth (cm):    Wound Description (Comments):    Removal Indications:    Wound Image   03/02/22 1731   Dressing Appearance Dry;Intact;Clean 03/02/22 1731   Drainage Amount Small 03/02/22 1731   Drainage Characteristics/Odor Serosanguineous 03/02/22 1731   Red (%), Wound Tissue Color 60 % 03/02/22 1731   Yellow (%), Wound Tissue Color 40 % 03/02/22 1731   Periwound Area Intact;Swelling;Dry;Pink;Other (see comments) 03/02/22 1731   Wound Length (cm) 2.3 cm 03/02/22 1731   Wound Width (cm) 2.2 cm 03/02/22 1731   Wound Surface Area (cm^2) 5.06 cm^2 03/02/22 1731   Care Cleansed with:;Soap and water 03/02/22 1731   Dressing Applied;Honey;Foam;Absorptive Pad;Rolled gauze;Other (comment) 03/02/22 1731   Periwound Care Moisture barrier applied 03/02/22 1731            Wound Right lower;lateral;distal Leg (Active)        Pre-existing:    Primary Wound Type:    Side: Right   Orientation: lower;lateral;distal   Location: Leg   Wound Number:    Ankle-Brachial Index:    Pulses:    Removal Indication and Assessment:    Wound Outcome:    (Retired) Wound Type:    (Retired) Wound Length (cm):     (Retired) Wound Width (cm):    (Retired) Depth (cm):    Wound Description (Comments):    Removal Indications:    Dressing Appearance Dry;Intact;Clean 03/02/22 1731   Drainage Amount Small 03/02/22 1731   Drainage Characteristics/Odor Serosanguineous 03/02/22 1731   Red (%), Wound Tissue Color 10 % 03/02/22 1731   Yellow (%), Wound Tissue Color 90 % 03/02/22 1731   Periwound Area Intact;Pink;Dry;Swelling 03/02/22 1731   Wound Length (cm) 0.6 cm 03/02/22 1731   Wound Width (cm) 0.7 cm 03/02/22 1731   Wound Surface Area (cm^2) 0.42 cm^2 03/02/22 1731   Care Cleansed with:;Soap and water 03/02/22 1731   Dressing Applied;Honey;Foam;Absorptive Pad;Rolled gauze;Other (comment) 03/02/22 1731   Periwound Care Moisture barrier applied 03/02/22 1731     Ana was seen in wound care clinic today placed in sitting position with legs elevated in treatment chair.  Dressing removed and the right leg cleaned with Easi-clense sponge and dried thoroughly.  Dr. Garrido evaluated patient and patient has been scheduled for an angiogram on 3/8/2022.  Calmoseptine barrier cream applied to anju-wound skin, medi-honey gel applied directly to wound base of each wound covered with foam, ABD pad, secured with roll gauze, paper tape and flex-net to hold bandages in place.  Dressing to be changed in three days.         Plan:       Right lower leg dressed as detailed above  Patient instructed to elevate legs when sitting for prolonged periods of time  Patient was warn not to get dressing wet and to use a cast cover for showering.  Should dressing become wet, patient is to remove dressing; shower with a mild soap, let water irrigate wound for 10 minutes while in shower, pat dry and place a wet-to-dry dressing over the wound cover with gauze, roll gauze and secure with paper tape.    Patient to notify this office immediately to have a new dressing applied.  Wound care instructions given to patient  Angiogram scheduled with Dr. Garrido 3/8/2022    Follow up visit to be scheduled after angiogram

## 2022-03-05 ENCOUNTER — LAB VISIT (OUTPATIENT)
Dept: PRIMARY CARE CLINIC | Facility: CLINIC | Age: 72
End: 2022-03-05
Attending: SURGERY
Payer: MEDICARE

## 2022-03-05 DIAGNOSIS — Z01.818 PRE-OP EVALUATION: ICD-10-CM

## 2022-03-05 PROCEDURE — U0005 INFEC AGEN DETEC AMPLI PROBE: HCPCS | Performed by: SURGERY

## 2022-03-05 PROCEDURE — U0003 INFECTIOUS AGENT DETECTION BY NUCLEIC ACID (DNA OR RNA); SEVERE ACUTE RESPIRATORY SYNDROME CORONAVIRUS 2 (SARS-COV-2) (CORONAVIRUS DISEASE [COVID-19]), AMPLIFIED PROBE TECHNIQUE, MAKING USE OF HIGH THROUGHPUT TECHNOLOGIES AS DESCRIBED BY CMS-2020-01-R: HCPCS | Mod: HCNC | Performed by: SURGERY

## 2022-03-07 ENCOUNTER — TELEPHONE (OUTPATIENT)
Dept: VASCULAR SURGERY | Facility: CLINIC | Age: 72
End: 2022-03-07
Payer: MEDICARE

## 2022-03-07 ENCOUNTER — ANESTHESIA EVENT (OUTPATIENT)
Dept: SURGERY | Facility: HOSPITAL | Age: 72
End: 2022-03-07
Payer: MEDICARE

## 2022-03-07 LAB
SARS-COV-2 RNA RESP QL NAA+PROBE: NOT DETECTED
SARS-COV-2- CYCLE NUMBER: NORMAL

## 2022-03-07 NOTE — ANESTHESIA PREPROCEDURE EVALUATION
03/07/2022  [facility] [address]  Anesthesia Pre-Operative Evaluation         Patient Name: Ana Patel  YOB: 1950  MRN: [id number]    SUBJECTIVE:     Pre-operative evaluation for Procedure(s) (LRB):  Angiogram Extremity Unilateral (Right)     03/07/2022    Ana Patel is a 72 y.o. female w/ a significant PMHx of PVD, HTN and morbid obesity. Patient was recently evaluated by Dr. Garrido.  CT showed a totally occluded right SFA with a short nubbin.  Per his note, vascular team plans for a left common femoral puncture right SFA PTA and stent.    Pt now presents for the above procedure(s).    Prev airway: None documented.    Patient Active Problem List   Diagnosis    Necrobiosis lipoidica    Pain of right lower leg    Essential hypertension    Polyarthritis    Morbid obesity       Review of patient's allergies indicates:   Allergen Reactions    Sulfa (sulfonamide antibiotics) Hives    Pravastatin Other (See Comments)     Muscle cramps    Ciprofloxacin Rash    Pcn [penicillins] Rash       Current Inpatient Medications:      No current facility-administered medications on file prior to encounter.     Current Outpatient Medications on File Prior to Encounter   Medication Sig Dispense Refill    amLODIPine (NORVASC) 5 MG tablet TAKE 1 TABLET ONE TIME DAILY 90 tablet 11    ezetimibe (ZETIA) 10 mg tablet Take 1 tablet (10 mg total) by mouth once daily. 90 tablet 3    lisinopriL (PRINIVIL,ZESTRIL) 20 MG tablet Take 1 tablet (20 mg total) by mouth once daily. 90 tablet 11    multivitamin capsule Take 1 capsule by mouth once daily.      pentoxifylline (TRENTAL) 400 mg TbSR 1 po tid with meals (Patient taking differently: Take 400 mg by mouth 3 (three) times daily with meals. 1 po tid with meals) 90 tablet 2    predniSONE (DELTASONE) 20 MG tablet Take 3 tablets (60 mg  total) by mouth once daily. (Patient taking differently: Take 20 mg by mouth 3 (three) times daily.) 90 tablet 0    acetaminophen (TYLENOL) 500 mg Cap       benzocaine 20 % Liqd       betamethasone dipropionate 0.05 % cream AAA BID x 1-2 wks then prn flares only. Avoid face, armpits, groin. 45 g 1    clobetasol 0.05% (TEMOVATE) 0.05 % Oint Apply topically 2 (two) times daily. 30 mg tube x 1. Apply 2 times daily to intact skin around ulcerations, avoid wound base. 30 g 0    collagenase (SANTYL) ointment Apply thickly to wound base once daily after cleansing. 50 g 1    doxycycline (VIBRAMYCIN) 100 MG Cap Take 1 capsule (100 mg total) by mouth 2 (two) times a day. Take with food; do not lay down 1-2 hours after taking, caution in sun. (Patient not taking: Reported on 3/7/2022) 30 capsule 3    erythromycin (ROMYCIN) ophthalmic ointment Place into the left eye every 6 (six) hours. 3.5 g 1    fexofenadine (ALLEGRA) 180 MG tablet Take 180 mg by mouth daily as needed.      FLUZONE HIGH-DOSE 2018-19, PF, 180 mcg/0.5 mL vaccine ADM 0.5ML IM UTD  0    furosemide (LASIX) 20 MG tablet TAKE 1 TABLET ONE TIME DAILY 90 tablet 11    meloxicam (MOBIC) 7.5 MG tablet TAKE 1 TABLET EVERY DAY 90 tablet 1    omega 3-dha-epa-fish oil (FISH OIL) 1,000 mg (120 mg-180 mg) Cap Take 1 capsule by mouth 2 (two) times daily. (Patient not taking: Reported on 3/7/2022)      rosuvastatin (CRESTOR) 5 MG tablet Take 5 mg by mouth once daily.      traMADoL (ULTRAM) 50 mg tablet Take 1 tablet (50 mg total) by mouth every 12 (twelve) hours as needed for Pain. Tramadol will taken as needed for pain instead of hydrocodone 40 tablet 3    triamcinolone acetonide 0.1% (KENALOG) 0.1 % ointment Apply topically 2 (two) times daily as needed (rash at legs). 454 g 3    triamcinolone acetonide 0.5% (KENALOG) 0.5 % Crea Apply topically 2 (two) times daily. 454 g 0       Past Surgical History:   Procedure Laterality Date    TONSILLECTOMY,  ADENOIDECTOMY         Social History     Socioeconomic History    Marital status:     Number of children: 2   Occupational History     Employer: [employer]   Tobacco Use    Smoking status: Current Every Day Smoker     Packs/day: 0.50     Years: 30.00     Pack years: 15.00     Types: Cigarettes    Smokeless tobacco: Never Used   Substance and Sexual Activity    Alcohol use: No     Alcohol/week: 0.0 standard drinks     Types: 1 Standard drinks or equivalent per week     Comment: maybe 1-2 yearly    Drug use: No    Sexual activity: Yes     Partners: Male   Social History Narrative    Retired , 6 yr olds. Min exercise.       OBJECTIVE:     Vital Signs Range (Last 24H):         Significant Labs:  Lab Results   Component Value Date    WBC 9.66 03/02/2022    HGB 13.9 03/02/2022    HCT 43.3 03/02/2022     (L) 03/02/2022    CHOL 195 11/10/2021    TRIG 219 (H) 11/10/2021    HDL 37 (L) 11/10/2021    ALT 17 11/10/2021    AST 19 11/10/2021     (L) 03/02/2022    K 5.0 03/02/2022     03/02/2022    CREATININE 0.7 03/02/2022    BUN 30 (H) 03/02/2022    CO2 26 03/02/2022    TSH 1.444 05/25/2018    HGBA1C 5.5 11/10/2021       Diagnostic Studies: No relevant studies.    EKG: No results found for this or any previous visit.    ECHOCARDIOGRAM:  TTE:  No results found for this or any previous visit.        ASSESSMENT/PLAN:           Pre-op Assessment    I have reviewed the Patient Summary Reports.     I have reviewed the Nursing Notes. I have reviewed the NPO Status.   I have reviewed the Medications.     Review of Systems  Anesthesia Hx:  No problems with previous Anesthesia  Neg history of prior surgery. Denies Family Hx of Anesthesia complications.   Denies Personal Hx of Anesthesia complications.   Hematology/Oncology:  Hematology Normal   Oncology Normal     Cardiovascular:   Hypertension PVD    Renal/:  Renal/ Normal     Hepatic/GI:  Hepatic/GI Normal    Musculoskeletal:    Arthritis     Neurological:  Neurology Normal    Endocrine:  Endocrine Normal           Anesthesia Plan  Type of Anesthesia, risks & benefits discussed:    Anesthesia Type: MAC, Gen Natural Airway  Intra-op Monitoring Plan: Standard ASA Monitors  Post Op Pain Control Plan: multimodal analgesia and IV/PO Opioids PRN  Induction:  IV  Informed Consent: Informed consent signed with the Patient and all parties understand the risks and agree with anesthesia plan.  All questions answered. Patient consented to blood products? No  ASA Score: 3  Day of Surgery Review of History & Physical: I have interviewed and examined the patient. I have reviewed the patient's H&P dated: There are no significant changes.     Ready For Surgery From Anesthesia Perspective.     .

## 2022-03-07 NOTE — PRE-PROCEDURE INSTRUCTIONS
NO SOLID FOOD, MILK OR MILK PRODUCTS 8 HOURS PRIOR TO SX START TIME.  YOU MAY ONLY HAVE SIPS OF WATER WITH MORNING MEDICATIONS (1) HOUR PRIOR TO ARRIVAL TO HOSPITAL. 0800    Preoperative NPO and Bowel Prep instructions given per CRS Dept. Patient's questions answered and patient V/C/U.    PREOP INSTRUCTIONS:    Shower instructions as well as directions to the Surgery Center were given.Encouraged to wear loose fitting,comfortable clothing.Medication instructions for pm prior to and am of procedure reviewed.Instructed to avoid taking vitamins,supplements,aspirin and ibuprofen the morning of surgery.     Patient advised of the updated visitor policy allowing one adult support person,pre and post procedure.     Patient denies any side effects or issues with anesthesia or sedation.      Patient does not know arrival time.Explained that this information comes from the surgeon's office.  Message sent to Eboni Virk RN   No reply received - pt given arrival time of 0900 based on MDS start time of 1100.

## 2022-03-08 ENCOUNTER — HOSPITAL ENCOUNTER (OUTPATIENT)
Facility: HOSPITAL | Age: 72
Discharge: HOME | End: 2022-03-08
Attending: SURGERY | Admitting: SURGERY
Payer: MEDICARE

## 2022-03-08 ENCOUNTER — ANESTHESIA (OUTPATIENT)
Dept: SURGERY | Facility: HOSPITAL | Age: 72
End: 2022-03-08
Payer: MEDICARE

## 2022-03-08 DIAGNOSIS — I73.9 PAD (PERIPHERAL ARTERY DISEASE): Primary | ICD-10-CM

## 2022-03-08 PROCEDURE — 37000009 HC ANESTHESIA EA ADD 15 MINS: Mod: HCNC | Performed by: SURGERY

## 2022-03-08 PROCEDURE — 25000003 PHARM REV CODE 250: Mod: HCNC | Performed by: STUDENT IN AN ORGANIZED HEALTH CARE EDUCATION/TRAINING PROGRAM

## 2022-03-08 PROCEDURE — 71000033 HC RECOVERY, INTIAL HOUR: Mod: HCNC | Performed by: SURGERY

## 2022-03-08 PROCEDURE — 63600175 PHARM REV CODE 636 W HCPCS: Mod: HCNC | Performed by: STUDENT IN AN ORGANIZED HEALTH CARE EDUCATION/TRAINING PROGRAM

## 2022-03-08 PROCEDURE — D9220A PRA ANESTHESIA: ICD-10-PCS | Mod: HCNC,,, | Performed by: ANESTHESIOLOGY

## 2022-03-08 PROCEDURE — 27201423 OPTIME MED/SURG SUP & DEVICES STERILE SUPPLY: Mod: HCNC | Performed by: SURGERY

## 2022-03-08 PROCEDURE — 25500020 PHARM REV CODE 255: Mod: HCNC | Performed by: SURGERY

## 2022-03-08 PROCEDURE — 71000015 HC POSTOP RECOV 1ST HR: Mod: HCNC | Performed by: SURGERY

## 2022-03-08 PROCEDURE — C1887 CATHETER, GUIDING: HCPCS | Mod: HCNC | Performed by: SURGERY

## 2022-03-08 PROCEDURE — C1876 STENT, NON-COA/NON-COV W/DEL: HCPCS | Mod: HCNC | Performed by: SURGERY

## 2022-03-08 PROCEDURE — 36000706: Mod: HCNC | Performed by: SURGERY

## 2022-03-08 PROCEDURE — 36000707: Mod: HCNC | Performed by: SURGERY

## 2022-03-08 PROCEDURE — 25000003 PHARM REV CODE 250: Mod: HCNC | Performed by: SURGERY

## 2022-03-08 PROCEDURE — 37000008 HC ANESTHESIA 1ST 15 MINUTES: Mod: HCNC | Performed by: SURGERY

## 2022-03-08 PROCEDURE — 63600175 PHARM REV CODE 636 W HCPCS: Mod: HCNC

## 2022-03-08 PROCEDURE — 37226 PR FEM/POPL REVASC W/STENT: ICD-10-PCS | Mod: RT,,, | Performed by: SURGERY

## 2022-03-08 PROCEDURE — D9220A PRA ANESTHESIA: Mod: HCNC,,, | Performed by: ANESTHESIOLOGY

## 2022-03-08 PROCEDURE — C1760 CLOSURE DEV, VASC: HCPCS | Mod: HCNC | Performed by: SURGERY

## 2022-03-08 PROCEDURE — 63600175 PHARM REV CODE 636 W HCPCS: Mod: HCNC | Performed by: SURGERY

## 2022-03-08 PROCEDURE — 37226 PR FEM/POPL REVASC W/STENT: CPT | Mod: RT,,, | Performed by: SURGERY

## 2022-03-08 PROCEDURE — C1894 INTRO/SHEATH, NON-LASER: HCPCS | Mod: HCNC | Performed by: SURGERY

## 2022-03-08 PROCEDURE — 71000044 HC DOSC ROUTINE RECOVERY FIRST HOUR: Mod: HCNC | Performed by: SURGERY

## 2022-03-08 PROCEDURE — C1769 GUIDE WIRE: HCPCS | Mod: HCNC | Performed by: SURGERY

## 2022-03-08 PROCEDURE — C1725 CATH, TRANSLUMIN NON-LASER: HCPCS | Mod: HCNC | Performed by: SURGERY

## 2022-03-08 PROCEDURE — 25000003 PHARM REV CODE 250: Mod: HCNC

## 2022-03-08 DEVICE — IMPLANTABLE DEVICE: Type: IMPLANTABLE DEVICE | Site: LEG | Status: FUNCTIONAL

## 2022-03-08 RX ORDER — PROPOFOL 10 MG/ML
VIAL (ML) INTRAVENOUS CONTINUOUS PRN
Status: DISCONTINUED | OUTPATIENT
Start: 2022-03-08 | End: 2022-03-08

## 2022-03-08 RX ORDER — ONDANSETRON 2 MG/ML
4 INJECTION INTRAMUSCULAR; INTRAVENOUS DAILY PRN
Status: DISCONTINUED | OUTPATIENT
Start: 2022-03-08 | End: 2022-03-08 | Stop reason: HOSPADM

## 2022-03-08 RX ORDER — HEPARIN SODIUM 1000 [USP'U]/ML
INJECTION, SOLUTION INTRAVENOUS; SUBCUTANEOUS
Status: DISCONTINUED | OUTPATIENT
Start: 2022-03-08 | End: 2022-03-08

## 2022-03-08 RX ORDER — LIDOCAINE HYDROCHLORIDE 10 MG/ML
INJECTION, SOLUTION EPIDURAL; INFILTRATION; INTRACAUDAL; PERINEURAL
Status: DISCONTINUED | OUTPATIENT
Start: 2022-03-08 | End: 2022-03-08 | Stop reason: HOSPADM

## 2022-03-08 RX ORDER — MIDAZOLAM HYDROCHLORIDE 1 MG/ML
INJECTION, SOLUTION INTRAMUSCULAR; INTRAVENOUS
Status: DISCONTINUED | OUTPATIENT
Start: 2022-03-08 | End: 2022-03-08

## 2022-03-08 RX ORDER — HYDROMORPHONE HYDROCHLORIDE 1 MG/ML
0.2 INJECTION, SOLUTION INTRAMUSCULAR; INTRAVENOUS; SUBCUTANEOUS EVERY 5 MIN PRN
Status: DISCONTINUED | OUTPATIENT
Start: 2022-03-08 | End: 2022-03-08 | Stop reason: HOSPADM

## 2022-03-08 RX ORDER — MEPERIDINE HYDROCHLORIDE 50 MG/ML
12.5 INJECTION INTRAMUSCULAR; INTRAVENOUS; SUBCUTANEOUS ONCE AS NEEDED
Status: DISCONTINUED | OUTPATIENT
Start: 2022-03-08 | End: 2022-03-08 | Stop reason: HOSPADM

## 2022-03-08 RX ORDER — FENTANYL CITRATE 50 UG/ML
INJECTION, SOLUTION INTRAMUSCULAR; INTRAVENOUS
Status: DISCONTINUED | OUTPATIENT
Start: 2022-03-08 | End: 2022-03-08

## 2022-03-08 RX ORDER — PROPOFOL 10 MG/ML
VIAL (ML) INTRAVENOUS
Status: DISCONTINUED | OUTPATIENT
Start: 2022-03-08 | End: 2022-03-08

## 2022-03-08 RX ORDER — IODIXANOL 320 MG/ML
INJECTION, SOLUTION INTRAVASCULAR
Status: DISCONTINUED | OUTPATIENT
Start: 2022-03-08 | End: 2022-03-08 | Stop reason: HOSPADM

## 2022-03-08 RX ORDER — LIDOCAINE HYDROCHLORIDE 10 MG/ML
1 INJECTION, SOLUTION EPIDURAL; INFILTRATION; INTRACAUDAL; PERINEURAL ONCE
Status: DISCONTINUED | OUTPATIENT
Start: 2022-03-08 | End: 2022-03-08 | Stop reason: HOSPADM

## 2022-03-08 RX ORDER — PROTAMINE SULFATE 10 MG/ML
INJECTION, SOLUTION INTRAVENOUS
Status: DISCONTINUED | OUTPATIENT
Start: 2022-03-08 | End: 2022-03-08

## 2022-03-08 RX ORDER — SODIUM CHLORIDE 0.9 % (FLUSH) 0.9 %
3 SYRINGE (ML) INJECTION
Status: DISCONTINUED | OUTPATIENT
Start: 2022-03-08 | End: 2022-03-08 | Stop reason: HOSPADM

## 2022-03-08 RX ORDER — HEPARIN SODIUM 1000 [USP'U]/ML
INJECTION, SOLUTION INTRAVENOUS; SUBCUTANEOUS
Status: DISCONTINUED | OUTPATIENT
Start: 2022-03-08 | End: 2022-03-08 | Stop reason: HOSPADM

## 2022-03-08 RX ORDER — MUPIROCIN 20 MG/G
OINTMENT TOPICAL
Status: DISCONTINUED | OUTPATIENT
Start: 2022-03-08 | End: 2022-03-08 | Stop reason: HOSPADM

## 2022-03-08 RX ORDER — CLOPIDOGREL BISULFATE 75 MG/1
75 TABLET ORAL DAILY
Qty: 30 TABLET | Refills: 11 | Status: SHIPPED | OUTPATIENT
Start: 2022-03-08 | End: 2022-05-12

## 2022-03-08 RX ORDER — SODIUM CHLORIDE 0.9 % (FLUSH) 0.9 %
10 SYRINGE (ML) INJECTION
Status: CANCELLED | OUTPATIENT
Start: 2022-03-08

## 2022-03-08 RX ORDER — CLOPIDOGREL BISULFATE 75 MG/1
75 TABLET ORAL DAILY
Status: DISCONTINUED | OUTPATIENT
Start: 2022-03-08 | End: 2022-03-08 | Stop reason: HOSPADM

## 2022-03-08 RX ORDER — LABETALOL HYDROCHLORIDE 5 MG/ML
INJECTION, SOLUTION INTRAVENOUS
Status: DISCONTINUED | OUTPATIENT
Start: 2022-03-08 | End: 2022-03-08

## 2022-03-08 RX ORDER — HYDRALAZINE HYDROCHLORIDE 20 MG/ML
INJECTION INTRAMUSCULAR; INTRAVENOUS
Status: DISCONTINUED | OUTPATIENT
Start: 2022-03-08 | End: 2022-03-08

## 2022-03-08 RX ADMIN — MIDAZOLAM 2 MG: 1 INJECTION INTRAMUSCULAR; INTRAVENOUS at 11:03

## 2022-03-08 RX ADMIN — SODIUM CHLORIDE: 9 INJECTION, SOLUTION INTRAVENOUS at 11:03

## 2022-03-08 RX ADMIN — VANCOMYCIN HYDROCHLORIDE 1250 MG: 1.5 INJECTION, POWDER, LYOPHILIZED, FOR SOLUTION INTRAVENOUS at 11:03

## 2022-03-08 RX ADMIN — FENTANYL CITRATE 100 MCG: 50 INJECTION INTRAMUSCULAR; INTRAVENOUS at 11:03

## 2022-03-08 RX ADMIN — PROPOFOL 40 MG: 10 INJECTION, EMULSION INTRAVENOUS at 11:03

## 2022-03-08 RX ADMIN — HYDRALAZINE HYDROCHLORIDE 10 MG: 20 INJECTION, SOLUTION INTRAMUSCULAR; INTRAVENOUS at 11:03

## 2022-03-08 RX ADMIN — LABETALOL HYDROCHLORIDE 5 MG: 5 INJECTION, SOLUTION INTRAVENOUS at 11:03

## 2022-03-08 RX ADMIN — MUPIROCIN: 20 OINTMENT TOPICAL at 10:03

## 2022-03-08 RX ADMIN — Medication 150 MCG/KG/MIN: at 11:03

## 2022-03-08 RX ADMIN — PROTAMINE SULFATE 5 MG: 10 INJECTION, SOLUTION INTRAVENOUS at 12:03

## 2022-03-08 RX ADMIN — MIDAZOLAM 1 MG: 1 INJECTION INTRAMUSCULAR; INTRAVENOUS at 11:03

## 2022-03-08 RX ADMIN — HEPARIN SODIUM 6000 UNITS: 1000 INJECTION, SOLUTION INTRAVENOUS; SUBCUTANEOUS at 11:03

## 2022-03-08 RX ADMIN — PROTAMINE SULFATE 25 MG: 10 INJECTION, SOLUTION INTRAVENOUS at 12:03

## 2022-03-08 NOTE — INTERVAL H&P NOTE
The patient has been examined and the H&P has been reviewed:    I concur with the findings and no changes have occurred since H&P was written.    Surgery risks, benefits and alternative options discussed and understood by patient/family.    To OR for RLE angiogram, L CFA approach.    Chavo Chase MD  Vascular Fellow PGY7        Chief Complaint: Delayed healing right leg ulceration     HPI: Ana Patel is a 72 y.o. female who is here today for evaluation of anterior right leg ulcerations. Symptoms include persistent 8/10 pain localized to the anterior shin, worse at night, characterized as a sharp aching cramp. Patient notes that it initially began as a rash to bilateral legs, although the left leg symptomatically resolved while the right continued to worsen. Pain symptoms began 5 months ago, and have progressively worsened. She relays not being able to sleep due to pain, no significant relief with tylenol, and found minor alleviation 5/10 pain with pain medication. Patient presented with left leg in compression stocking and right leg with unna boot and calcium alginate dressings. Patient has no history of DVT. Symptoms limit patient's functional status and daily activities. She denies any history of inflammatory bowel disease. She does say that she was diagnosed with arthritis as a child but this apparently resolved.     Migraine with aura: No  PFO/ASD/right to left shunt:  no  Pregnant: No  Breastfeeding:  No  MI: no  Stroke: No  Seizure Disorder: No             Past Medical History:   Diagnosis Date    Eczema      Hyperlipidemia      Hypertension      Necrobiosis lipoidica      Obesity      Osteoarthritis      Varicose vein of leg                 Past Surgical History:   Procedure Laterality Date    TONSILLECTOMY, ADENOIDECTOMY             Social History            Occupational History       Employer: [employer]   Tobacco Use    Smoking status: Current Every Day Smoker       Packs/day: 0.50        Years: 30.00       Pack years: 15.00       Types: Cigarettes    Smokeless tobacco: Never Used   Substance and Sexual Activity    Alcohol use: No       Alcohol/week: 0.0 standard drinks       Types: 1 Standard drinks or equivalent per week       Comment: maybe 1-2 yearly    Drug use: No    Sexual activity: Yes       Partners: Male            Current Outpatient Medications:     acetaminophen (TYLENOL) 500 mg Cap, , Disp: , Rfl:     amLODIPine (NORVASC) 5 MG tablet, TAKE 1 TABLET ONE TIME DAILY, Disp: 90 tablet, Rfl: 11    benzocaine 20 % Liqd, , Disp: , Rfl:     betamethasone dipropionate 0.05 % cream, AAA BID x 1-2 wks then prn flares only. Avoid face, armpits, groin., Disp: 45 g, Rfl: 1    collagenase (SANTYL) ointment, Apply thickly to wound base once daily after cleansing., Disp: 50 g, Rfl: 1    doxycycline (VIBRAMYCIN) 100 MG Cap, Take 1 capsule (100 mg total) by mouth 2 (two) times a day. Take with food; do not lay down 1-2 hours after taking, caution in sun., Disp: 30 capsule, Rfl: 3    erythromycin (ROMYCIN) ophthalmic ointment, Place into the left eye every 6 (six) hours., Disp: 3.5 g, Rfl: 1    ezetimibe (ZETIA) 10 mg tablet, Take 1 tablet (10 mg total) by mouth once daily., Disp: 90 tablet, Rfl: 3    fexofenadine (ALLEGRA) 180 MG tablet, Take 180 mg by mouth daily as needed., Disp: , Rfl:     FLUZONE HIGH-DOSE 2018-19, PF, 180 mcg/0.5 mL vaccine, ADM 0.5ML IM UTD, Disp: , Rfl: 0    furosemide (LASIX) 20 MG tablet, TAKE 1 TABLET ONE TIME DAILY, Disp: 90 tablet, Rfl: 11    lisinopriL (PRINIVIL,ZESTRIL) 20 MG tablet, Take 1 tablet (20 mg total) by mouth once daily., Disp: 90 tablet, Rfl: 11    meloxicam (MOBIC) 7.5 MG tablet, TAKE 1 TABLET EVERY DAY, Disp: 90 tablet, Rfl: 1    multivitamin capsule, Take 1 capsule by mouth once daily., Disp: , Rfl:     omega 3-dha-epa-fish oil (FISH OIL) 1,000 mg (120 mg-180 mg) Cap, Take 1 capsule by mouth 2 (two) times daily., Disp: , Rfl:     pentoxifylline (TRENTAL) 400 mg  TbSR, 1 po tid with meals, Disp: 90 tablet, Rfl: 2    traMADoL (ULTRAM) 50 mg tablet, Take 1 tablet (50 mg total) by mouth every 12 (twelve) hours as needed for Pain. Tramadol will taken as needed for pain instead of hydrocodone, Disp: 40 tablet, Rfl: 3    triamcinolone acetonide 0.1% (KENALOG) 0.1 % ointment, Apply topically 2 (two) times daily as needed (rash at legs)., Disp: 454 g, Rfl: 3    triamcinolone acetonide 0.5% (KENALOG) 0.5 % Crea, Apply topically 2 (two) times daily., Disp: 454 g, Rfl: 0    clobetasol 0.05% (TEMOVATE) 0.05 % Oint, Apply topically 2 (two) times daily. 30 mg tube x 1. Apply 2 times daily to intact skin around ulcerations, avoid wound base., Disp: 30 g, Rfl: 0    predniSONE (DELTASONE) 20 MG tablet, Take 3 tablets (60 mg total) by mouth once daily., Disp: 90 tablet, Rfl: 0    rosuvastatin (CRESTOR) 5 MG tablet, Take 5 mg by mouth once daily., Disp: , Rfl:      Review of Systems   Constitutional: Negative for chills, decreased appetite, fever, weight gain and weight loss.   HENT: Negative for ear pain and nosebleeds.    Eyes: Negative for discharge and pain.   Cardiovascular: Negative for chest pain and palpitations.   Respiratory: Negative for cough, shortness of breath and wheezing.    Endocrine: Negative for cold intolerance, heat intolerance and polyphagia.   Skin: Positive for color change, poor wound healing and rash.   Musculoskeletal: Negative for joint swelling and muscle cramps.   Gastrointestinal: Negative for abdominal pain, diarrhea, nausea and vomiting.   Genitourinary: Negative for dysuria and flank pain.   Neurological: Negative for numbness, paresthesias, seizures and sensory change.            II. PHYSICAL EXAM      Physical Exam  Vitals reviewed.   Constitutional:       General: She is in acute distress.      Appearance: She is obese. She is not ill-appearing, toxic-appearing or diaphoretic.   HENT:      Head: Normocephalic and atraumatic.      Nose: Nose normal.   Eyes:       General:         Right eye: No discharge.         Left eye: No discharge.   Cardiovascular:      Rate and Rhythm: Normal rate.      Pulses: Intact distal pulses.           Dorsalis pedis pulses are 2+ on the right side and 2+ on the left side.        Posterior tibial pulses are 2+ on the right side and 2+ on the left side.      Heart sounds: Normal heart sounds.   Pulmonary:      Effort: Pulmonary effort is normal. No respiratory distress.      Breath sounds: No wheezing.   Abdominal:      General: Abdomen is flat. There is no distension.      Tenderness: There is no abdominal tenderness.   Musculoskeletal:      Right lower leg: Edema (moderate) present.      Left lower leg: Edema (Minimal-controlled) present.   Skin:     General: Skin is warm and dry.      Capillary Refill: Capillary refill takes 2 to 3 seconds.      Findings: Erythema and lesion (R anterior shin) present.      Comments: Right leg ulcerations:   - Skin warm to touch, same as contralateral   - Mild-Moderate serous drainage noted to full thickness ulcerations and dressings. No: active drainage, purulence, mal odor. Wound base 50% fibrotic, 50% granular, 0% necrotic.    Neurological:      Mental Status: She is alert and oriented to person, place, and time.            Photo from 1/20/22     Imaging Results: (I have personally reviewed the images/studies and provided my interpretation below)     BLE Venous Duplex ultrasound 12/23/21 Impression: No evidence of L or R LE DVT     01/26/22 CTA runoff ABD PED: There is complete occlusion of the proximal right SFA with distal reconstitution.  Probable bilateral three-vessel runoff.     III. ASSESSMENT & PLAN (MEDICAL DECISION MAKING)           Encounter Diagnosis   Name Primary?    Pyoderma gangrenosum Yes            Assessment/Diagnosis and Plan:  72 y.o. female referred for evaluation of delayed healing painful right leg ulcerations. Although she has PAD her AJ is >0.5 and wounds are not suggestive of  ischemic ulcerations. She does not have any of the typical stigmatta of venous insufficiency (no varicose veins, minimal edema).    These ulcers are causing severe pain and have not improved at all with Unna boot therapy. The appearance of the wounds and fact that they have not improve with Unna boot is suggestive of pyoderma gangrenosum. Will treat empirically with prednisone and refer to rheumatology for further management of medical treatment.

## 2022-03-08 NOTE — BRIEF OP NOTE
[facility] - Surgery (2nd Fl)  Brief Operative Note    Surgery Date: 3/8/2022     Surgeon(s) and Role:     * Eliud Garrido MD - Primary     * Torin Chase MD - Fellow    Assisting Surgeon: None    Pre-op Diagnosis:  Peripheral vascular disease, unspecified [I73.9]    Post-op Diagnosis:  Post-Op Diagnosis Codes:     * Peripheral vascular disease, unspecified [I73.9]    Procedure(s) (LRB):  Angiogram Extremity Unilateral (Right)  PTA (ANGIOPLASTY, PERCUTANEOUS, TRANSLUMINAL) (Right) (Predilation 4x200 ultraverse, post-stent 6x100 ultraverse)  STENT, FEMORAL ARTERY (Right) (EV3 x2 180x6, 120x6 and lifestent 80x6)    Anesthesia: Local MAC    Operative Findings:   Able to cross SFA lesion in subintimal plane with re-entry in distal SFA.  Stented SFA in 3 segments as above.   Brisk runoff to foot via PT.      Estimated Blood Loss: 10ml         Specimens:   Specimen (24h ago, onward)            None            Discharge Note    OUTCOME: Patient tolerated treatment/procedure well without complication and is now ready for discharge.    DISPOSITION: Home or Self Care    FINAL DIAGNOSIS:  Occluded SFA    FOLLOWUP: In clinic    DISCHARGE INSTRUCTIONS:    Discharge Procedure Orders   Diet renal     Remove dressing in 48 hours     Notify your health care provider if you experience any of the following:  temperature >100.4     Notify your health care provider if you experience any of the following:  persistent nausea and vomiting or diarrhea     Notify your health care provider if you experience any of the following:  severe uncontrolled pain     Notify your health care provider if you experience any of the following:  redness, tenderness, or signs of infection (pain, swelling, redness, odor or green/yellow discharge around incision site)     Notify your health care provider if you experience any of the following:  difficulty breathing or increased cough     Notify your health care provider if you experience any of the  following:  worsening rash     Activity as tolerated

## 2022-03-08 NOTE — TRANSFER OF CARE
Anesthesia Transfer of Care Note    Patient: Ana Patel    Procedure(s) Performed: Procedure(s) (LRB):  Angiogram Extremity Unilateral (Right)  PTA (ANGIOPLASTY, PERCUTANEOUS, TRANSLUMINAL) (Right)  STENT, FEMORAL ARTERY (Right)    Patient location: [facility]    Anesthesia Type: general    Transport from OR: Transported from OR on 6-10 L/min O2 by face mask with adequate spontaneous ventilation    Post pain: adequate analgesia    Post assessment: no apparent anesthetic complications    Post vital signs: stable    Level of consciousness: alert and awake    Nausea/Vomiting: no nausea/vomiting    Complications: none    Transfer of care protocol was followed      Last vitals:   Visit Vitals  BP (!) 185/79   Pulse 65   Temp 36.6 °C (97.8 °F)   Resp 16   Ht 6' (1.829 m)   Wt 127 kg (280 lb)   SpO2 98%   Breastfeeding No   BMI 37.97 kg/m²

## 2022-03-08 NOTE — ANESTHESIA POSTPROCEDURE EVALUATION
Anesthesia Post Evaluation    Patient: Ana Patel    Procedure(s) Performed: Procedure(s) (LRB):  Angiogram Extremity Unilateral (Right)  PTA (ANGIOPLASTY, PERCUTANEOUS, TRANSLUMINAL) (Right)  STENT, FEMORAL ARTERY (Right)    Final Anesthesia Type: general (Natural airway)      Patient location during evaluation: [facility]  Patient participation: Yes- Able to Participate  Level of consciousness: awake and alert  Post-procedure vital signs: reviewed and stable  Pain management: adequate  Airway patency: patent    PONV status at discharge: No PONV  Anesthetic complications: no      Cardiovascular status: hemodynamically stable  Respiratory status: unassisted  Hydration status: euvolemic  Follow-up not needed.          Vitals Value Taken Time   /71 03/08/22 1446   Temp 35.7 °C (96.3 °F) 03/08/22 1320   Pulse 64 03/08/22 1451   Resp 18 03/08/22 1430   SpO2 96 % 03/08/22 1451   Vitals shown include unvalidated device data.      No case tracking events are documented in the log.      Pain/Raúl Score: Raúl Score: 9 (3/8/2022  2:15 PM)

## 2022-03-08 NOTE — PATIENT INSTRUCTIONS
VASCULAR SURGERY DISCHARGE INSTRUCTIONS    Woundcare:  - Take your incision dressing off 2 days after your surgery and gently rinse your incision with soap and water daily. Pad the incision dry afterward  - When resting or sleeping, try to keep your arm elevated to shoulder level on pillows to reduce swelling  - If you notice clear drainage from your incision, you can apply dry gauze daily and secure in place with tape or gentle elastic wrap    Activity:  - No heavy lifting  - Sleep with your arm elevated on pillows at night to reduce swelling  - No swimming in pools, [employer], [employer] etc. for 6 weeks after your surgery    Diet:  -Resume your pre-operative home diet    Follow up:  -Refer to follow up instructions     Call Vascular Surgery Office at [phone] if you experience:  -Increased redness, warmth, tenderness, or draining pus at your incision(s)  -Worsening fevers, chills, nausea/vomiting  -Pain, weakness, coldness, or numbness in your hand  -Uncontrolled pain  -Your call will be returned within 24 hours and further instructions will be provided    Go to ER/Urgent Care if you experience:  -Worsening shortness of breath or chest pain

## 2022-03-08 NOTE — PLAN OF CARE
Adult Patient Discharge. CONTRERAS/PADSS Scoring met. PO Liquids tolerated prior to discharge. Education provided.

## 2022-03-09 VITALS
TEMPERATURE: 96 F | BODY MASS INDEX: 37.93 KG/M2 | WEIGHT: 280 LBS | RESPIRATION RATE: 18 BRPM | HEIGHT: 72 IN | DIASTOLIC BLOOD PRESSURE: 79 MMHG | OXYGEN SATURATION: 97 % | SYSTOLIC BLOOD PRESSURE: 165 MMHG | HEART RATE: 68 BPM

## 2022-03-11 ENCOUNTER — OFFICE VISIT (OUTPATIENT)
Dept: INTERNAL MEDICINE | Facility: CLINIC | Age: 72
End: 2022-03-11
Payer: MEDICARE

## 2022-03-11 ENCOUNTER — TELEPHONE (OUTPATIENT)
Dept: VASCULAR SURGERY | Facility: CLINIC | Age: 72
End: 2022-03-11
Payer: MEDICARE

## 2022-03-11 ENCOUNTER — TELEPHONE (OUTPATIENT)
Dept: WOUND CARE | Facility: CLINIC | Age: 72
End: 2022-03-11
Payer: MEDICARE

## 2022-03-11 VITALS
HEART RATE: 94 BPM | DIASTOLIC BLOOD PRESSURE: 66 MMHG | HEIGHT: 72 IN | SYSTOLIC BLOOD PRESSURE: 118 MMHG | OXYGEN SATURATION: 97 % | RESPIRATION RATE: 18 BRPM | BODY MASS INDEX: 37.97 KG/M2

## 2022-03-11 DIAGNOSIS — I73.9 PAD (PERIPHERAL ARTERY DISEASE): ICD-10-CM

## 2022-03-11 DIAGNOSIS — Z12.11 COLON CANCER SCREENING: ICD-10-CM

## 2022-03-11 DIAGNOSIS — I73.9 PAD (PERIPHERAL ARTERY DISEASE): Primary | ICD-10-CM

## 2022-03-11 DIAGNOSIS — M19.90 OSTEOARTHRITIS, UNSPECIFIED OSTEOARTHRITIS TYPE, UNSPECIFIED SITE: Primary | ICD-10-CM

## 2022-03-11 DIAGNOSIS — F17.200 SMOKER: ICD-10-CM

## 2022-03-11 PROCEDURE — 99999 PR PBB SHADOW E&M-EST. PATIENT-LVL V: ICD-10-PCS | Mod: PBBFAC,,, | Performed by: INTERNAL MEDICINE

## 2022-03-11 PROCEDURE — 3008F BODY MASS INDEX DOCD: CPT | Mod: CPTII,S$GLB,, | Performed by: INTERNAL MEDICINE

## 2022-03-11 PROCEDURE — 4010F PR ACE/ARB THEARPY RXD/TAKEN: ICD-10-PCS | Mod: CPTII,S$GLB,, | Performed by: INTERNAL MEDICINE

## 2022-03-11 PROCEDURE — 1125F AMNT PAIN NOTED PAIN PRSNT: CPT | Mod: CPTII,S$GLB,, | Performed by: INTERNAL MEDICINE

## 2022-03-11 PROCEDURE — 3008F PR BODY MASS INDEX (BMI) DOCUMENTED: ICD-10-PCS | Mod: CPTII,S$GLB,, | Performed by: INTERNAL MEDICINE

## 2022-03-11 PROCEDURE — 99999 PR PBB SHADOW E&M-EST. PATIENT-LVL V: CPT | Mod: PBBFAC,,, | Performed by: INTERNAL MEDICINE

## 2022-03-11 PROCEDURE — 4010F ACE/ARB THERAPY RXD/TAKEN: CPT | Mod: CPTII,S$GLB,, | Performed by: INTERNAL MEDICINE

## 2022-03-11 PROCEDURE — 3288F PR FALLS RISK ASSESSMENT DOCUMENTED: ICD-10-PCS | Mod: CPTII,S$GLB,, | Performed by: INTERNAL MEDICINE

## 2022-03-11 PROCEDURE — 3078F PR MOST RECENT DIASTOLIC BLOOD PRESSURE < 80 MM HG: ICD-10-PCS | Mod: CPTII,S$GLB,, | Performed by: INTERNAL MEDICINE

## 2022-03-11 PROCEDURE — 3078F DIAST BP <80 MM HG: CPT | Mod: CPTII,S$GLB,, | Performed by: INTERNAL MEDICINE

## 2022-03-11 PROCEDURE — 99215 PR OFFICE/OUTPT VISIT, EST, LEVL V, 40-54 MIN: ICD-10-PCS | Mod: S$GLB,,, | Performed by: INTERNAL MEDICINE

## 2022-03-11 PROCEDURE — 1101F PT FALLS ASSESS-DOCD LE1/YR: CPT | Mod: CPTII,S$GLB,, | Performed by: INTERNAL MEDICINE

## 2022-03-11 PROCEDURE — 1101F PR PT FALLS ASSESS DOC 0-1 FALLS W/OUT INJ PAST YR: ICD-10-PCS | Mod: CPTII,S$GLB,, | Performed by: INTERNAL MEDICINE

## 2022-03-11 PROCEDURE — 1160F PR REVIEW ALL MEDS BY PRESCRIBER/CLIN PHARMACIST DOCUMENTED: ICD-10-PCS | Mod: CPTII,S$GLB,, | Performed by: INTERNAL MEDICINE

## 2022-03-11 PROCEDURE — 1159F MED LIST DOCD IN RCRD: CPT | Mod: CPTII,S$GLB,, | Performed by: INTERNAL MEDICINE

## 2022-03-11 PROCEDURE — 1159F PR MEDICATION LIST DOCUMENTED IN MEDICAL RECORD: ICD-10-PCS | Mod: CPTII,S$GLB,, | Performed by: INTERNAL MEDICINE

## 2022-03-11 PROCEDURE — 3074F SYST BP LT 130 MM HG: CPT | Mod: CPTII,S$GLB,, | Performed by: INTERNAL MEDICINE

## 2022-03-11 PROCEDURE — 3074F PR MOST RECENT SYSTOLIC BLOOD PRESSURE < 130 MM HG: ICD-10-PCS | Mod: CPTII,S$GLB,, | Performed by: INTERNAL MEDICINE

## 2022-03-11 PROCEDURE — 1160F RVW MEDS BY RX/DR IN RCRD: CPT | Mod: CPTII,S$GLB,, | Performed by: INTERNAL MEDICINE

## 2022-03-11 PROCEDURE — 1125F PR PAIN SEVERITY QUANTIFIED, PAIN PRESENT: ICD-10-PCS | Mod: CPTII,S$GLB,, | Performed by: INTERNAL MEDICINE

## 2022-03-11 PROCEDURE — 3288F FALL RISK ASSESSMENT DOCD: CPT | Mod: CPTII,S$GLB,, | Performed by: INTERNAL MEDICINE

## 2022-03-11 PROCEDURE — 99215 OFFICE O/P EST HI 40 MIN: CPT | Mod: S$GLB,,, | Performed by: INTERNAL MEDICINE

## 2022-03-11 RX ORDER — OXYCODONE HYDROCHLORIDE 5 MG/1
5 TABLET ORAL
Qty: 30 TABLET | Refills: 0 | Status: SHIPPED | OUTPATIENT
Start: 2022-03-11 | End: 2022-03-21

## 2022-03-11 NOTE — OP NOTE
[facility] - Surgery ([facility])  Surgery Department  Operative Note    SUMMARY     Surgery Date: 3/8/2022      Surgeon(s) and Role:     * Eliud Garrido MD - Primary     * Torin Chase MD - Fellow     Assisting Surgeon: None     Pre-op Diagnosis:  Non-healing RLE foot ulcer     Post-op Diagnosis:  Non-healing RLE foot ulcer     Procedure(s) (LRB):  Angiogram Extremity Unilateral (Right)  PTA (ANGIOPLASTY, PERCUTANEOUS, TRANSLUMINAL) (Right) (Predilation 4x200 ultraverse, post-stent 6x100 ultraverse)  STENT, FEMORAL ARTERY (Right) (EV3 x2 180x6, 120x6 and lifestent 80x6)     Anesthesia: Local MAC     Operative Findings:   Able to cross SFA lesion in subintimal plane with re-entry in distal SFA.  Stented SFA in 3 segments as above.   Brisk runoff to foot via PT.       Estimated Blood Loss: 10ml    Description of Technical Procedures:   After informed consent was obtained patient was brought to the operating room laid supine on the operating table.  Moderate sedation was provided by Anesthesia.  Patient's left groin was prepped draped standard fashion.  We excluded the right groin is there was a fungal infection present A time-out was performed.  The left common femoral artery was accessed via ultrasound guidance.  Micro wire advanced easily.  Needle was exchanged for 4 Cymro micro sheath.  Angiography revealed good access with no dissection.  A stiff angled glidewire was inserted and directed into the distal aorta.  Four Cymro micro sheath was exchanged for a 5 Cymro sheath.  A Omni catheter was inserted and the wire was directed down the contralateral iliac system.  The wire was advanced and the catheter was brought down to the level of pelvic brim.  Angiography was performed revealing patent common femoral artery rather free of disease, a patent and wide profunda artery and an SFA occlusion with a small nub.  We then directed the stiff angled Glidewire into the profunda and exchanged our 5 Cymro sheath for a 5  Andorran 45 cm sheath that went up and over.  The tip of this was paced at the pelvic brim.  We then retracted our stiff angled Glidewire and advanced this into the SFA.  There was a small branch that was keeping the artery partially open.  We inserted a Powell catheter to help direct the wire subintimal E. We advanced the wire and removed the Powell catheter in favor of a Seeker catheter.  We were able to advance a loop of wire distally to the mid SFA.  We then reperformed angiography  which revealed reconstitution of the distal SFA as seen on CT scan.  We advanced our wire beyond the level of the occlusion and were able to gain intraluminal access.  This was confirmed by dancing or Seeker catheter into the lumen and performed angiography revealing good filling of popliteal artery.  We exchanged for a exchange length stiff angled glide.  We removed the Seeker catheter inserted a 4 x 200 balloon and pre-dilated the entire length of the SFA lesion.  We then shows 3 bare metal stents.  We deployed a 6 x 180 mm balloon distally at the termination of the lesion.  We overlapped a 6 x 120 mm Ev 3 stent into this.  Finally at the most proximal portion of the occlusion we deployed a 8 x 60 mm LifeStent to the origin of the occlusion.  Repeat angiography revealed excellent result with some tortuosity at the distal occlusive site, likely due to heavy calcification.  We post dilated this all with a 6 x 100 Ultraverse and multiple inflations.  Fine on angiography revealed excellent result with resolution of occlusion.  Her runoff included a peroneal which occluded mid tibia and excellent PT the ran all the way to the foot.  There are radiation concluded we removed our 5 Andorran sheath and inserted a regular length 5 Andorran sheath.  We performed closure with a 5 Andorran Mynx device with good result.  Patient tolerated procedure very well.  She was taken postanesthesia recovery unit in good condition after protamine was  administered.             Implants:   Implant Name Type Inv. Item Serial No.  Lot No. LRB No. Used Action   STENT EVFLEX ENTRUST [id number] - [id number]  STENT EVFLEX ENTRUST [id number]  [employer] [facility] [id number] Right 1 Implanted   STENT EVFLEX ENTRUST [id number] - [id number]  STENT EVFLEX ENTRUST [id number]  [employer] [facility] [id number] Right 1 Implanted   STENT LIFESTENT 7X80MM 135CM - [id number] stent peripheral bms - self exp STENT LIFESTENT 7X80MM 135CM  [employer] [place] [id number] Right 1 Implanted       Specimens:   Specimen (24h ago, onward)            None                  Condition: Good    Disposition: PACU - hemodynamically stable.

## 2022-03-11 NOTE — PROGRESS NOTES
Subjective:       Patient ID: Ana Patel is a 72 y.o. female.    Chief Complaint: No chief complaint on file.    Patient is here for followup for chronic conditions.    Since last time I saw her she was diagnosed with R leg SFA blockage, she is s/p 3 blockages. She does feel improved pain in the RLE. Feels btr mentally since the stents placement.    She has chronic bruises on arm especially, even before starting ASA/Plavix.    Her lower RLE sores have been healing but still very tender with dsg changes. Tramadol not strong enough to help dsg changes but does help arthritis knee pains.    Since last appt saw Dr Alvarez and no clear autoimmune disease found or illness a/w PG found.    1 month ago started on prednisone 60mg daily, she has a few tabs left.    Review of Systems   Constitutional: Negative for activity change, appetite change and unexpected weight change.   Eyes: Negative for visual disturbance.   Respiratory: Negative for cough, chest tightness and shortness of breath.    Cardiovascular: Negative for chest pain.   Gastrointestinal: Negative for abdominal distention and abdominal pain.   Genitourinary: Negative for difficulty urinating and urgency.   Musculoskeletal: Positive for arthralgias. Negative for joint swelling.   Skin: Positive for rash (necrobiosis bilat legs and eczema) and wound (RLE open sores).   Neurological: Negative for tremors, syncope and weakness.           Objective:      Physical Exam  Vitals reviewed.   Constitutional:       General: She is not in acute distress.     Appearance: Normal appearance. She is well-developed. She is obese. She is not ill-appearing, toxic-appearing or diaphoretic.      Comments: Diff rising to exam table 2/2 wt and jts   HENT:      Head: Normocephalic and atraumatic.   Eyes:      General: No scleral icterus.     Pupils: Pupils are equal, round, and reactive to light.   Neck:      Thyroid: No thyromegaly.   Cardiovascular:      Rate and Rhythm:  Normal rate and regular rhythm.      Heart sounds: Normal heart sounds. No murmur heard.    No friction rub. No gallop.   Pulmonary:      Effort: Pulmonary effort is normal. No respiratory distress.      Breath sounds: Normal breath sounds. No wheezing or rales.   Abdominal:      General: Bowel sounds are normal. There is no distension.      Palpations: Abdomen is soft. There is no mass.      Tenderness: There is no abdominal tenderness. There is no guarding or rebound.   Musculoskeletal:         General: Normal range of motion.      Cervical back: Normal range of motion.      Comments: Valgus LLE leg.  Knee crepitus bilat.   Lymphadenopathy:      Cervical: No cervical adenopathy.   Skin:     Comments: RLE lower leg in ace wrap, sores not seen today    R foot unable to palpate pulses, but foot is not cold, CRT<3 sec   Neurological:      Mental Status: She is alert and oriented to person, place, and time.   Psychiatric:         Speech: Speech normal.         Behavior: Behavior normal.         Assessment:       1. Osteoarthritis, unspecified osteoarthritis type, unspecified site    2. PAD (peripheral artery disease)    3. Colon cancer screening    4. Smoker        Plan:       Diagnoses and all orders for this visit:    Osteoarthritis, unspecified osteoarthritis type, unspecified site  -     oxyCODONE (ROXICODONE) 5 MG immediate release tablet; Take 1 tablet (5 mg total) by mouth every 24 hours as needed for Pain.  Especially with painful leg ulcers RLE    PAD (peripheral artery disease)  -     oxyCODONE (ROXICODONE) 5 MG immediate release tablet; Take 1 tablet (5 mg total) by mouth every 24 hours as needed for Pain.    Colon cancer screening  -     Cologuard Screening (Multitarget Stool DNA); Future  -     Cologuard Screening (Multitarget Stool DNA)    Smoker  -     Ambulatory referral/consult to Smoking Cessation Program; Future    Prednisone --  Patient Instructions   Take 1/2 tablet Prednisone 20mg every day for 5  days  Let me know on Wednesday 3/16 how you are feeling  Will likely then taper from 10mg    Over 40 minutes spent with patient and majority in counseling and patient education.        Health Maintenance       Date Due Completion Date    Diabetes Urine Screening Never done ---    Foot Exam Never done ---    Sign Pain Contract Never done ---    Complete Opioid Risk Tool Never done ---    Low Dose Statin Never done ---    Eye Exam 09/28/2018 9/28/2017    Colorectal Cancer Screening 05/28/2019 5/28/2018    Mammogram 09/03/2020 9/3/2019    DEXA Scan 10/09/2020 10/9/2017    Shingles Vaccine (3 of 3) 11/20/2020 9/25/2020    Hemoglobin A1c 05/10/2022 11/10/2021    Lipid Panel 11/10/2022 11/10/2021    TETANUS VACCINE 11/10/2031 11/10/2021    Override on 8/24/2017: Declined      She does not have diabetes  Next time mmg    Follow up in about 3 months (around 6/11/2022).    Future Appointments   Date Time Provider Department Center   3/14/2022  1:30 PM NURSE, WOUND CARE [facility] WOUND [facility]   3/21/2022  8:30 AM Sarkis Alvarez MD NOM RHEUM [facility]   6/8/2022  1:15 PM Jr Maza DPM [facility] POD [facility]   6/17/2022  2:40 PM Gordo Naik MD [facility] IM [facility] PCW

## 2022-03-11 NOTE — PATIENT INSTRUCTIONS
Take 1/2 tablet Prednisone 20mg every day for 5 days  Let me know on Wednesday 3/16 how you are feeling

## 2022-03-11 NOTE — TELEPHONE ENCOUNTER
----- Message from Koki Simms sent at 3/11/2022 12:47 PM CST -----  Regarding: Call Back  Pt  requesting to speak with staff    [phone] (home)

## 2022-03-11 NOTE — TELEPHONE ENCOUNTER
Appointment scheduled, pt verified. Appointment letter placed in mail.   ----- Message from Jr Quick sent at 3/11/2022  1:04 PM CST -----  Contact: @[phone]  Caller requesting a call back to set up a follow up appt after surgery.  Please call to discuss further.

## 2022-03-11 NOTE — TELEPHONE ENCOUNTER
[facility]   TRIAGE ASSESSMENT    Name: Ricardo Finley  MRN: [id number]  : 1988  Age: 29 y.o.  Date of assessment: 2017  PCP: Pcp Pt States None  Persons in attendance: Patient    CHIEF COMPLAINT/PRESENTING ISSUE (as stated by permission given to this nurse to talk to father colette at [phone]  He gives long hx addiction with family and courts (after DUI) putting him into tx. Which he walked away from.  Father states he has shared custody with x wife of children but he doesn't allow him to be alone with them because he is so out of control and if he lost visitation with children he probably would kill self. Father states his g-friend is as bad an addict as him and when they are together everything gets worse.  Ricardo denies all that his father told this nurse and is very unhappy being here and wants to leave, he was informed of legal 2000 and 72 hr and awaiting transfer to [facility] facility for evaluation and tx. And until then he would remain at [facility].    ):   Chief Complaint   Patient presents with   • Suicidal Ideation        CURRENT LIVING SITUATION/SOCIAL SUPPORT: lives with father in house, has 2 children, one brother, parents supportive but are , again he refused to give permission to talk with mother. Father states addiction comes from both sides family       BEHAVIORAL HEALTH TREATMENT HISTORY  Does patient/parent report a history of prior behavioral health treatment for patient?   he is angry and denies everything, but was in denial over what took place to get him here,  he was placed on legal  for suicide attempt and that was noted by the text he made to his love ones stating he was bleeding and leave him alone.    SAFETY ASSESSMENT - SELF  Does patient acknowledge current or past symptoms of dangerousness to self? no  Does parent/significant other report patient has current or past symptoms of dangerousness to self? no  Does presenting problem suggest symptoms of  Returned call and spoke with , appointment schedule for Monday 3/14/2022 at 130PM post angiogram/wound care visit with Dr. Garrido.     dangerousness to self? he denies any prior attempts or psy tx. in past.  he feels he is ok to leave here but he was informed he was unable to leave and security would stop him from leaving.     SAFETY ASSESSMENT - OTHERS  Does patient acknowledge current or past symptoms of aggressive behavior or risk to others? no  Does parent/significant other report patient has current or past symptoms of aggressive behavior or risk to others?  no and did talk with father and he did not state nahed had trouble with aggressive behavior but he did walk away from tx ctr recently.     Does presenting problem suggest symptoms of dangerousness to others? No    Crisis Safety Plan completed and copy given to patient? On legal 2000 for next 72 hr awaiting accetance IP psy facility .      ABUSE/NEGLECT SCREENING  Does patient report feeling “unsafe” in his/her home, or afraid of anyone?  no  Does patient report any history of physical, sexual, or emotional abuse?  no  Does parent or significant other report any of the above? yes and father states he is out of control and he did text aunt that he was going to die.    Is there evidence of neglect by self?  no  Is there evidence of neglect by a caregiver? no  Does the patient/parent report any history of CPS/APS/police involvement related to suspected abuse/neglect or domestic violence? no  Based on the information provided during the current assessment, is a mandated report of suspected abuse/neglect being made?  No    SUBSTANCE USE SCREENING  Yes:  Derek all substances used in the past 30 days:      Last Use Amount   [x]   Alcohol  Last noc.  Drinks daily according to him      [x]   Marijuana he was able to pay drug test for new job so states it has been 1/2 mon.      []   Heroin     []   Prescription Opioids  (used without prescription, for    recreation, or in excess of prescribed amount)     []   Other Prescription  (used without prescription, for    recreation, or in excess of  "prescribed amount)     [x]   Cocaine one yr ago       [x]   Methamphetamine used meth daily at 18 for 1 yr, noted majority of teeth are down to gums.     []   \"\" drugs (ectasy, MDMA)     []   Other substances        UDS results: neg  Breathalyzer results: 0.033    What consequences does the patient associate with any of the above substance use and or addictive behaviors? Other: had dui in oct, now has warrant out for not paying fine or staying in CD program.  Father does not allow him to be alone with children and never allow to drive (he has no licence according to father)    Risk factors for detox (check all that apply):  []  Seizures   []  Diaphoretic (sweating)   []  Tremors   [x]  Hallucinations   []  Increased blood pressure   []  Decreased blood pressure   []  Other   []  None      [x] Patient education on risk factors for detoxification and instructed to return to ER as needed.will be observed for next 72 hr     UDS results: neg     for detoxification and instructed to return to ER as needed.  MENTAL STATUS   Participation: Guarded, Defensive and Resistant  Grooming: Disheveled  Orientation: Alert  Behavior: Agitated and Tense  Eye contact: Poor  Mood: Depressed, Anxious, Angry and Irritable  Affect: Constricted, Sad, Anxious and Angry  Thought process: Circumstantial  Thought content: Within normal limits  Speech: Pressured and Rapid  Perception: Within normal limits  Memory:  No gross evidence of memory deficits  Insight: Poor  Judgment:  Poor  Other:    Collateral information: father, police     Source:  [] Significant other present in person:   [] Significant other by telephone  [] [facility]   [] [facility] Nursing Staff  [] [facility]  [] Other:     [] Unable to complete full assessment due to:  [] Acute intoxication  [] Patient declined to participate/engage  [] Patient verbally unresponsive  [] Significant cognitive deficits  [] Significant perceptual distortions or behavioral " disorganization  [] Other:      CLINICAL IMPRESSIONS:  Primary:  Dep with suici  de attempt per his statement to aunt.   Secondary:  Substance abuse.          IDENTIFIED NEEDS/PLAN:  [Trigger DISPOSITION list for any items marked]    [x]  Imminent safety risk - self s/attempt  [] Imminent safety risk - others   []  Acute substance withdrawl []  Psychosis/Impaired reality testing   [x]  Mood/anxiety dep with no tx. Ever   [x]  Substance use/Addictive behavior hx addiction with refusal to participate in recovery    []  Maladaptive behaviro []  Parent/child conflict   [x]  Family/Couples conflict father is person who called police last noc.  []  Biomedical   []  Housing []  Financial   []   Legal  Occupational/Educational   []  Domestic violence []  Other:     Disposition: placed legal 2000 by Dr. Blancas for his safety and protection, suicide attempt last noc. with broken glass from beer bottle. refused t let doctor stitch it.    Does patient express agreement with the above plan? no    Referral appointment(s) scheduled? N\A    Alert team only: 29 yr old male causc. With long hx addiction, last noc text. Aunt  That he was bleeding and didn't want to live anymore.  Police notified by father and he was found in park with lac. To lt wrist cut by glass from beer bottle.   I have discussed findings and recommendations with Dr. Blancas   who is in agreement with these recommendations.     Referral documentation sent to the following facilities:  [facility] for evaluation and treatment.      Joyce Ozuna R.N.  2/11/2017

## 2022-03-14 ENCOUNTER — CLINICAL SUPPORT (OUTPATIENT)
Dept: WOUND CARE | Facility: CLINIC | Age: 72
End: 2022-03-14
Payer: MEDICARE

## 2022-03-14 VITALS
HEIGHT: 72 IN | DIASTOLIC BLOOD PRESSURE: 74 MMHG | TEMPERATURE: 97 F | WEIGHT: 280 LBS | SYSTOLIC BLOOD PRESSURE: 144 MMHG | HEART RATE: 78 BPM | BODY MASS INDEX: 37.93 KG/M2

## 2022-03-14 DIAGNOSIS — L98.491 SKIN ULCER, LIMITED TO BREAKDOWN OF SKIN: Primary | ICD-10-CM

## 2022-03-14 PROCEDURE — 99999 PR PBB SHADOW E&M-EST. PATIENT-LVL IV: CPT | Mod: PBBFAC,,,

## 2022-03-14 PROCEDURE — 99999 PR PBB SHADOW E&M-EST. PATIENT-LVL IV: ICD-10-PCS | Mod: PBBFAC,,,

## 2022-03-14 NOTE — PROGRESS NOTES
Subjective:       Patient ID: Ana Patel is a 72 y.o. female.    Chief Complaint: Wound Check (Skin ulcer, limited to breakdown of skin)    HPI  Review of Systems    Objective:      Physical Exam    Assessment:       No diagnosis found.       Wound Right anterior;distal;lower Leg (Active)        Pre-existing:    Primary Wound Type:    Side: Right   Orientation: anterior;distal;lower   Location: Leg   Wound Number:    Ankle-Brachial Index:    Pulses:    Removal Indication and Assessment:    Wound Outcome:    (Retired) Wound Type:    (Retired) Wound Length (cm):    (Retired) Wound Width (cm):    (Retired) Depth (cm):    Wound Description (Comments):    Removal Indications:    Wound Image   03/14/22 1729   Dressing Appearance Open to air 03/14/22 1729   Drainage Amount Scant 03/14/22 1729   Drainage Characteristics/Odor Sanguineous 03/14/22 1729   Red (%), Wound Tissue Color 100 % 03/14/22 1729   Periwound Area Intact;Dry;Pink 03/14/22 1729   Wound Length (cm) 1.5 cm 03/14/22 1729   Wound Width (cm) 0.9 cm 03/14/22 1729   Wound Surface Area (cm^2) 1.35 cm^2 03/14/22 1729   Care Cleansed with:;Soap and water 03/14/22 1729   Dressing Honey 03/14/22 1729   Periwound Care Moisture barrier applied 03/14/22 1729   Compression Unna's Boot;Two layer compression 03/14/22 1729   Dressing Change Due 03/21/22 03/14/22 1729            Wound Right anterior;distal;lower Leg (Active)        Pre-existing:    Primary Wound Type:    Side: Right   Orientation: anterior;distal;lower   Location: Leg   Wound Number:    Ankle-Brachial Index:    Pulses:    Removal Indication and Assessment:    Wound Outcome:    (Retired) Wound Type:    (Retired) Wound Length (cm):    (Retired) Wound Width (cm):    (Retired) Depth (cm):    Wound Description (Comments):    Removal Indications:    Wound Image   03/14/22 1729   Dressing Appearance Open to air 03/14/22 1729   Drainage Amount Scant 03/14/22 1729   Drainage Characteristics/Odor Yellow  03/14/22 1729   Red (%), Wound Tissue Color 95 % 03/14/22 1729   Yellow (%), Wound Tissue Color 10 % 03/14/22 1729   Periwound Area Intact;Dry 03/14/22 1729   Wound Length (cm) 0.9 cm 03/14/22 1729   Wound Width (cm) 2.4 cm 03/14/22 1729   Wound Surface Area (cm^2) 2.16 cm^2 03/14/22 1729   Care Cleansed with:;Soap and water 03/14/22 1729   Dressing Honey 03/14/22 1729   Periwound Care Moisture barrier applied 03/14/22 1729   Compression Two layer compression 03/14/22 1729   Dressing Change Due 03/21/22 03/14/22 1729            Wound Right distal;lower;lateral Leg (Active)        Pre-existing:    Primary Wound Type:    Side: Right   Orientation: distal;lower;lateral   Location: Leg   Wound Number:    Ankle-Brachial Index:    Pulses:    Removal Indication and Assessment:    Wound Outcome:    (Retired) Wound Type:    (Retired) Wound Length (cm):    (Retired) Wound Width (cm):    (Retired) Depth (cm):    Wound Description (Comments):    Removal Indications:    Wound Image   03/14/22 1729   Dressing Appearance Open to air 03/14/22 1729   Drainage Amount Small 03/14/22 1729   Drainage Characteristics/Odor Sanguineous 03/14/22 1729   Red (%), Wound Tissue Color 100 % 03/14/22 1729   Periwound Area Intact;Dry;Pink 03/14/22 1729   Wound Length (cm) 2.9 cm 03/14/22 1729   Wound Width (cm) 2.4 cm 03/14/22 1729   Wound Surface Area (cm^2) 6.96 cm^2 03/14/22 1729   Care Cleansed with: 03/14/22 1729   Dressing Honey 03/14/22 1729   Periwound Care Moisture barrier applied 03/14/22 1729   Compression Two layer compression 03/14/22 1729   Dressing Change Due 03/21/22 03/14/22 1729   Ana was seen in wound care clinic today placed in sitting position with legs elevated in treatment chair.  Dressing removed and the right leg cleaned with Easi-clense sponge and dried thoroughly. Calmoseptine barrier cream applied to anju-wound skin,calamine coflex wrap for compression applied per package instructions. Patient's foot ws positioned at  a 90 degree angle. A two layer calamine coflex wrap was applied, the calamine foam layer was applied first followed by the cohesive coban outer layer using a spiral technique avoiding creases or folds. Each layer was started behind the first metatarsal and ended below the tibial tubercle of the knee. There was overlap of each turn half the width of the previous turn. Coflex wrap to change in seven days.             Plan:     Calamine unna boot right lower leg as detailed above  Patient instructed to elevate legs when sitting for prolonged periods of time  Patient was warn not to get dressing wet and to use a cast cover for showering.  Should dressing become wet, patient is to remove by unrolling each layer from the top going down; shower with a mild soap, pat dry and place a wet-to-dry dressing over the wound cover with gauze, roll gauze and secure with paper tape.  Apply two ace wraps for compression and to secure bandages.  Patient to notify this office immediately to have a new dressing applied.  Wound care instructions given to patient  Return to clinic next week 03/21/2022

## 2022-03-16 ENCOUNTER — PATIENT MESSAGE (OUTPATIENT)
Dept: ADMINISTRATIVE | Facility: HOSPITAL | Age: 72
End: 2022-03-16
Payer: MEDICARE

## 2022-03-20 ENCOUNTER — PATIENT MESSAGE (OUTPATIENT)
Dept: INTERNAL MEDICINE | Facility: CLINIC | Age: 72
End: 2022-03-20
Payer: MEDICARE

## 2022-03-21 ENCOUNTER — CLINICAL SUPPORT (OUTPATIENT)
Dept: WOUND CARE | Facility: CLINIC | Age: 72
End: 2022-03-21
Payer: MEDICARE

## 2022-03-21 ENCOUNTER — TELEPHONE (OUTPATIENT)
Dept: INTERNAL MEDICINE | Facility: CLINIC | Age: 72
End: 2022-03-21
Payer: MEDICARE

## 2022-03-21 ENCOUNTER — OFFICE VISIT (OUTPATIENT)
Dept: RHEUMATOLOGY | Facility: CLINIC | Age: 72
End: 2022-03-21
Payer: MEDICARE

## 2022-03-21 VITALS
DIASTOLIC BLOOD PRESSURE: 73 MMHG | BODY MASS INDEX: 39.68 KG/M2 | SYSTOLIC BLOOD PRESSURE: 121 MMHG | HEIGHT: 72 IN | WEIGHT: 293 LBS | HEART RATE: 80 BPM

## 2022-03-21 VITALS
TEMPERATURE: 99 F | SYSTOLIC BLOOD PRESSURE: 150 MMHG | HEIGHT: 72 IN | DIASTOLIC BLOOD PRESSURE: 70 MMHG | BODY MASS INDEX: 39.68 KG/M2 | WEIGHT: 293 LBS | HEART RATE: 84 BPM

## 2022-03-21 DIAGNOSIS — L98.491 SKIN ULCER, LIMITED TO BREAKDOWN OF SKIN: Primary | ICD-10-CM

## 2022-03-21 DIAGNOSIS — L92.1 NECROBIOSIS LIPOIDICA: Primary | ICD-10-CM

## 2022-03-21 DIAGNOSIS — L97.919 ULCER OF RIGHT LOWER EXTREMITY, UNSPECIFIED ULCER STAGE: ICD-10-CM

## 2022-03-21 PROCEDURE — 99499 UNLISTED E&M SERVICE: CPT | Mod: S$GLB,,, | Performed by: INTERNAL MEDICINE

## 2022-03-21 PROCEDURE — 1159F MED LIST DOCD IN RCRD: CPT | Mod: CPTII,S$GLB,, | Performed by: INTERNAL MEDICINE

## 2022-03-21 PROCEDURE — 99213 PR OFFICE/OUTPT VISIT, EST, LEVL III, 20-29 MIN: ICD-10-PCS | Mod: S$GLB,,, | Performed by: INTERNAL MEDICINE

## 2022-03-21 PROCEDURE — 99213 OFFICE O/P EST LOW 20 MIN: CPT | Mod: S$GLB,,, | Performed by: INTERNAL MEDICINE

## 2022-03-21 PROCEDURE — 1125F PR PAIN SEVERITY QUANTIFIED, PAIN PRESENT: ICD-10-PCS | Mod: CPTII,S$GLB,, | Performed by: INTERNAL MEDICINE

## 2022-03-21 PROCEDURE — 1125F AMNT PAIN NOTED PAIN PRSNT: CPT | Mod: CPTII,S$GLB,, | Performed by: INTERNAL MEDICINE

## 2022-03-21 PROCEDURE — 3074F PR MOST RECENT SYSTOLIC BLOOD PRESSURE < 130 MM HG: ICD-10-PCS | Mod: CPTII,S$GLB,, | Performed by: INTERNAL MEDICINE

## 2022-03-21 PROCEDURE — 1160F RVW MEDS BY RX/DR IN RCRD: CPT | Mod: CPTII,S$GLB,, | Performed by: INTERNAL MEDICINE

## 2022-03-21 PROCEDURE — 99999 PR PBB SHADOW E&M-EST. PATIENT-LVL IV: ICD-10-PCS | Mod: PBBFAC,,,

## 2022-03-21 PROCEDURE — 1159F PR MEDICATION LIST DOCUMENTED IN MEDICAL RECORD: ICD-10-PCS | Mod: CPTII,S$GLB,, | Performed by: INTERNAL MEDICINE

## 2022-03-21 PROCEDURE — 4010F ACE/ARB THERAPY RXD/TAKEN: CPT | Mod: CPTII,S$GLB,, | Performed by: INTERNAL MEDICINE

## 2022-03-21 PROCEDURE — 4010F PR ACE/ARB THEARPY RXD/TAKEN: ICD-10-PCS | Mod: CPTII,S$GLB,, | Performed by: INTERNAL MEDICINE

## 2022-03-21 PROCEDURE — 3074F SYST BP LT 130 MM HG: CPT | Mod: CPTII,S$GLB,, | Performed by: INTERNAL MEDICINE

## 2022-03-21 PROCEDURE — 99999 PR PBB SHADOW E&M-EST. PATIENT-LVL IV: ICD-10-PCS | Mod: PBBFAC,,, | Performed by: INTERNAL MEDICINE

## 2022-03-21 PROCEDURE — 3008F PR BODY MASS INDEX (BMI) DOCUMENTED: ICD-10-PCS | Mod: CPTII,S$GLB,, | Performed by: INTERNAL MEDICINE

## 2022-03-21 PROCEDURE — 99999 PR PBB SHADOW E&M-EST. PATIENT-LVL IV: CPT | Mod: PBBFAC,,, | Performed by: INTERNAL MEDICINE

## 2022-03-21 PROCEDURE — 3078F PR MOST RECENT DIASTOLIC BLOOD PRESSURE < 80 MM HG: ICD-10-PCS | Mod: CPTII,S$GLB,, | Performed by: INTERNAL MEDICINE

## 2022-03-21 PROCEDURE — 99999 PR PBB SHADOW E&M-EST. PATIENT-LVL IV: CPT | Mod: PBBFAC,,,

## 2022-03-21 PROCEDURE — 99499 RISK ADDL DX/OHS AUDIT: ICD-10-PCS | Mod: S$GLB,,, | Performed by: INTERNAL MEDICINE

## 2022-03-21 PROCEDURE — 3008F BODY MASS INDEX DOCD: CPT | Mod: CPTII,S$GLB,, | Performed by: INTERNAL MEDICINE

## 2022-03-21 PROCEDURE — 1160F PR REVIEW ALL MEDS BY PRESCRIBER/CLIN PHARMACIST DOCUMENTED: ICD-10-PCS | Mod: CPTII,S$GLB,, | Performed by: INTERNAL MEDICINE

## 2022-03-21 PROCEDURE — 3078F DIAST BP <80 MM HG: CPT | Mod: CPTII,S$GLB,, | Performed by: INTERNAL MEDICINE

## 2022-03-21 RX ORDER — HYDROCODONE BITARTRATE AND ACETAMINOPHEN 5; 325 MG/1; MG/1
1 TABLET ORAL EVERY 12 HOURS PRN
Qty: 20 TABLET | Refills: 0 | Status: SHIPPED | OUTPATIENT
Start: 2022-03-21 | End: 2022-03-29

## 2022-03-21 RX ORDER — HYDROXYCHLOROQUINE SULFATE 200 MG/1
400 TABLET, FILM COATED ORAL DAILY
Qty: 60 TABLET | Refills: 6 | Status: SHIPPED | OUTPATIENT
Start: 2022-03-21 | End: 2022-04-20

## 2022-03-21 NOTE — TELEPHONE ENCOUNTER
Spoke to patient advised of Hydrocodone being sent in instead of oxycodone .    Pharmacy correctly listed ([facility])     Patient stated that she is no longer taking the prednisone, she stated she finished it.

## 2022-03-21 NOTE — PROGRESS NOTES
Subjective:       Patient ID: Ana Patel is a 72 y.o. female.    Chief Complaint: Wound Check (Skin ulcer, right lower extremity )    HPI  Review of Systems    Objective:      Physical Exam    Assessment:       1. Skin ulcer, limited to breakdown of skin           Wound Right anterior;lower;proximal Leg (Active)        Pre-existing:    Primary Wound Type:    Side: Right   Orientation: anterior;lower;proximal   Location: Leg   Wound Number:    Ankle-Brachial Index:    Pulses:    Removal Indication and Assessment:    Wound Outcome:    (Retired) Wound Type:    (Retired) Wound Length (cm):    (Retired) Wound Width (cm):    (Retired) Depth (cm):    Wound Description (Comments):    Removal Indications:    Wound Image   03/21/22 1034   Dressing Appearance Dry;Intact;Clean 03/21/22 1034   Drainage Amount Moderate 03/21/22 1034   Drainage Characteristics/Odor Serosanguineous;Yellow 03/21/22 1034   Red (%), Wound Tissue Color 20 % 03/21/22 1034   Yellow (%), Wound Tissue Color 80 % 03/21/22 1034   Periwound Area Intact;Dry;Pink 03/21/22 1034   Wound Length (cm) 1.6 cm 03/21/22 1034   Wound Width (cm) 1 cm 03/21/22 1034   Wound Surface Area (cm^2) 1.6 cm^2 03/21/22 1034   Care Cleansed with:;Soap and water 03/21/22 1034   Dressing Applied;Hydrogel;Compression wrap 03/21/22 1034   Periwound Care Dry periwound area maintained 03/21/22 1034   Compression Two layer compression;Other (see comments) 03/21/22 1034   Dressing Change Due 03/28/22 03/21/22 1034            Wound Right anterior;lower;midline Leg (Active)        Pre-existing:    Primary Wound Type:    Side: Right   Orientation: anterior;lower;midline   Location: Leg   Wound Number:    Ankle-Brachial Index:    Pulses:    Removal Indication and Assessment:    Wound Outcome:    (Retired) Wound Type:    (Retired) Wound Length (cm):    (Retired) Wound Width (cm):    (Retired) Depth (cm):    Wound Description (Comments):    Removal Indications:    Dressing Appearance  Dry;Intact;Clean 03/21/22 1034   Drainage Amount Moderate 03/21/22 1034   Drainage Characteristics/Odor Serosanguineous;Yellow 03/21/22 1034   Red (%), Wound Tissue Color 20 % 03/21/22 1034   Yellow (%), Wound Tissue Color 80 % 03/21/22 1034   Periwound Area Intact;Dry;Pink 03/21/22 1034   Wound Length (cm) 1.4 cm 03/21/22 1034   Wound Width (cm) 2.9 cm 03/21/22 1034   Wound Surface Area (cm^2) 4.06 cm^2 03/21/22 1034   Care Cleansed with:;Soap and water 03/21/22 1034   Dressing Applied;Hydrogel;Compression wrap 03/21/22 1034   Periwound Care Dry periwound area maintained 03/21/22 1034   Compression Two layer compression;Other (see comments) 03/21/22 1034   Dressing Change Due 03/28/22 03/21/22 1034            Wound Right anterior;distal;lower Leg (Active)        Pre-existing:    Primary Wound Type:    Side: Right   Orientation: anterior;distal;lower   Location: Leg   Wound Number:    Ankle-Brachial Index:    Pulses:    Removal Indication and Assessment:    Wound Outcome:    (Retired) Wound Type:    (Retired) Wound Length (cm):    (Retired) Wound Width (cm):    (Retired) Depth (cm):    Wound Description (Comments):    Removal Indications:    Dressing Appearance Dry;Intact;Clean 03/21/22 1034   Drainage Amount Moderate 03/21/22 1034   Drainage Characteristics/Odor Serosanguineous;Yellow 03/21/22 1034   Red (%), Wound Tissue Color 40 % 03/21/22 1034   Yellow (%), Wound Tissue Color 60 % 03/21/22 1034   Periwound Area Intact;Dry;Pink 03/21/22 1034   Wound Length (cm) 2.7 cm 03/21/22 1034   Wound Width (cm) 3 cm 03/21/22 1034   Wound Surface Area (cm^2) 8.1 cm^2 03/21/22 1034   Care Cleansed with:;Soap and water 03/21/22 1034   Dressing Applied;Hydrogel;Compression wrap 03/21/22 1034   Periwound Care Dry periwound area maintained 03/21/22 1034   Compression Two layer compression;Other (see comments) 03/21/22 1034   Dressing Change Due 03/28/22 03/21/22 1034            Wound Right anterior;lower;lateral Leg (Active)         Pre-existing:    Primary Wound Type:    Side: Right   Orientation: anterior;lower;lateral   Location: Leg   Wound Number:    Ankle-Brachial Index:    Pulses:    Removal Indication and Assessment:    Wound Outcome:    (Retired) Wound Type:    (Retired) Wound Length (cm):    (Retired) Wound Width (cm):    (Retired) Depth (cm):    Wound Description (Comments):    Removal Indications:    Wound Image   03/21/22 1034   Dressing Appearance Dry;Intact;Clean 03/21/22 1034   Drainage Amount Moderate 03/21/22 1034   Drainage Characteristics/Odor Serosanguineous;Yellow 03/21/22 1034   Red (%), Wound Tissue Color 60 % 03/21/22 1034   Yellow (%), Wound Tissue Color 40 % 03/21/22 1034   Periwound Area Intact;Dry;Pink 03/21/22 1034   Wound Length (cm) 1.5 cm 03/21/22 1034   Wound Width (cm) 1 cm 03/21/22 1034   Wound Surface Area (cm^2) 1.5 cm^2 03/21/22 1034   Care Cleansed with:;Soap and water 03/21/22 1034   Dressing Applied;Hydrogel;Compression wrap 03/21/22 1034   Compression Two layer compression;Other (see comments) 03/21/22 1034   Dressing Change Due 03/28/22 03/21/22 1034     Ana was seen in wound care clinic today placed in sitting position with legs elevated in treatment chair.  Dressing removed and the right leg cleaned with Easi-clense sponge and dried thoroughly.  Calmoseptine barrier cream applied to anju-wound skin with wound hydrogel to base of wounds, calamine coflex wrap for compression applied per package instructions. Patient's foot was positioned at a 90 degree angle. A two layer calamine coflex wrap was applied, the calamine foam layer was applied first followed by the cohesive coban outer layer using a spiral technique avoiding creases or folds. Each layer was started behind the first metatarsal and ended below the tibial tubercle of the knee. There was overlap of each turn half the width of the previous turn. Coflex wrap to change in seven days.             Plan:       Calamine unna boot right lower  leg as detailed above  Patient instructed to elevate legs when sitting for prolonged periods of time  Patient was warn not to get dressing wet and to use a cast cover for showering.  Should dressing become wet, patient is to remove by unrolling each layer from the top going down; shower with a mild soap, pat dry and place a wet-to-dry dressing over the wound cover with gauze, roll gauze and secure with paper tape.  Apply two ace wraps for compression and to secure bandages.  Patient to notify this office immediately to have a new dressing applied.  Wound care instructions given to patient  Return to Vascular Surgery clinic to see Dr. Garrido  next week 03/28/2022 with AJ study at 1pm

## 2022-03-21 NOTE — PATIENT INSTRUCTIONS
Patient instructed to elevated legs while sitting  Patient warn not to get dressing wet, If dressing should be come wet, patient will unroll each layer from the top going down, shower, pat dry and apply wet-to-dry dressing with roll gauze to secure dressing and ace wraps for compression  Return to vascular surgery clinic Monday 3/28/2022 with AJ study at 1pm to see Dr. Garrido

## 2022-03-21 NOTE — PROGRESS NOTES
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  She remembers it in the lower extremities.  She had trouble walking.  It may have lasted 6 months.  She was treated with unknown medication.  She has had no recurrence of the problem.     She has had a rash on her leg since she was 18 years old.  She had a previous biopsy which revealed necrobiosis lipoidica.  She has also been clinically the diagnosed as having lipodermatosclerosis.  She developed ulcers on her right leg in December.  She has been going to Wound Care.  She had had at least 1 or 2 prior episodes of ulcers on her legs.  She had been treated with steroids in the past for the ulcers.  She had only been on topical medications for the rash.  She has been going to wound care.  She was seen by vascular surgery.  She was diagnosed as having pyoderma gangrenosum.  She was placed on high-dose prednisone.    I saw her for the 1st time 1 month ago.  Clinically she had no evidence to suggest an underlying connective tissue disease.  She does have osteoarthritis.  I ordered laboratory workup and she comes back for follow-up.    Since her last visit she has had stents in the right leg.  She is going to wound care.  She has had some increased pain in the leg for the past week.  She is not remove the dressing.  She is uncertain about healing.  She is now off prednisone.  The redness has improved in both legs.  She still has swelling in the legs.  She still has arthritic pain.    Physical examination:  Extremities:  I did not unwrap the wound.  Her legs appear less erythematous.    Assessment:  1. NLD versus pyoderma gangrenosum  2. Osteoarthritis  3. Peripheral vascular disease    Plans:  I elected to start her on Plaquenil 400 mg daily to try to help with her underlying condition and maybe even help her wound healing.  It is more to prevent  recurrence of the disease.  She was informed that she will need a baseline eye exam and then yearly eye exams.  I do not wish to add an immunosuppressive drug at this point until we are certain there is no infection.  I will see her in follow-up in 3 months.

## 2022-03-21 NOTE — TELEPHONE ENCOUNTER
No new care gaps identified.  Powered by [employer]. Reference number: [id number].   3/21/2022 9:01:05 AM SHANNONT

## 2022-03-21 NOTE — TELEPHONE ENCOUNTER
Hi, please call -- I can send in Hydrocodone instead of Oxycodone. Please ask which pharmacy I should send it to.    Also, please ask whether she is still taking Prednisone, is she taking 10mg, one-half of a 20mg tablet. Please ask how she is on the lower dose of Prednisone.    Let me know if patient has any more questions.  Thank you, Gordo Naik

## 2022-03-22 NOTE — TELEPHONE ENCOUNTER
Hi, sent in hydrocodone to her local [facility] yesterday.  Thank you, Gorod Naik

## 2022-03-28 ENCOUNTER — HOSPITAL ENCOUNTER (OUTPATIENT)
Dept: VASCULAR SURGERY | Facility: CLINIC | Age: 72
Discharge: HOME | End: 2022-03-28
Attending: SURGERY
Payer: MEDICARE

## 2022-03-28 ENCOUNTER — OFFICE VISIT (OUTPATIENT)
Dept: VASCULAR SURGERY | Facility: CLINIC | Age: 72
End: 2022-03-28
Payer: MEDICARE

## 2022-03-28 ENCOUNTER — PATIENT OUTREACH (OUTPATIENT)
Dept: ADMINISTRATIVE | Facility: OTHER | Age: 72
End: 2022-03-28
Payer: MEDICARE

## 2022-03-28 VITALS — HEART RATE: 83 BPM | DIASTOLIC BLOOD PRESSURE: 56 MMHG | TEMPERATURE: 98 F | SYSTOLIC BLOOD PRESSURE: 121 MMHG

## 2022-03-28 DIAGNOSIS — M79.89 LEFT LEG SWELLING: Primary | ICD-10-CM

## 2022-03-28 DIAGNOSIS — R60.0 LOCALIZED EDEMA: ICD-10-CM

## 2022-03-28 DIAGNOSIS — M79.89 LEFT LEG SWELLING: ICD-10-CM

## 2022-03-28 DIAGNOSIS — M79.3 LIPODERMATOSCLEROSIS OF BOTH LOWER EXTREMITIES: Primary | ICD-10-CM

## 2022-03-28 DIAGNOSIS — I73.9 PAD (PERIPHERAL ARTERY DISEASE): ICD-10-CM

## 2022-03-28 PROCEDURE — 3078F PR MOST RECENT DIASTOLIC BLOOD PRESSURE < 80 MM HG: ICD-10-PCS | Mod: CPTII,S$GLB,, | Performed by: SURGERY

## 2022-03-28 PROCEDURE — 1159F PR MEDICATION LIST DOCUMENTED IN MEDICAL RECORD: ICD-10-PCS | Mod: CPTII,S$GLB,, | Performed by: SURGERY

## 2022-03-28 PROCEDURE — 4010F ACE/ARB THERAPY RXD/TAKEN: CPT | Mod: CPTII,S$GLB,, | Performed by: SURGERY

## 2022-03-28 PROCEDURE — 93923 UPR/LXTR ART STDY 3+ LVLS: CPT | Mod: S$GLB,,, | Performed by: SURGERY

## 2022-03-28 PROCEDURE — 1126F PR PAIN SEVERITY QUANTIFIED, NO PAIN PRESENT: ICD-10-PCS | Mod: CPTII,S$GLB,, | Performed by: SURGERY

## 2022-03-28 PROCEDURE — 3288F PR FALLS RISK ASSESSMENT DOCUMENTED: ICD-10-PCS | Mod: CPTII,S$GLB,, | Performed by: SURGERY

## 2022-03-28 PROCEDURE — 99999 PR PBB SHADOW E&M-EST. PATIENT-LVL IV: CPT | Mod: PBBFAC,,, | Performed by: SURGERY

## 2022-03-28 PROCEDURE — 99213 OFFICE O/P EST LOW 20 MIN: CPT | Mod: S$GLB,,, | Performed by: SURGERY

## 2022-03-28 PROCEDURE — 3078F DIAST BP <80 MM HG: CPT | Mod: CPTII,S$GLB,, | Performed by: SURGERY

## 2022-03-28 PROCEDURE — 99213 PR OFFICE/OUTPT VISIT, EST, LEVL III, 20-29 MIN: ICD-10-PCS | Mod: S$GLB,,, | Performed by: SURGERY

## 2022-03-28 PROCEDURE — 4010F PR ACE/ARB THEARPY RXD/TAKEN: ICD-10-PCS | Mod: CPTII,S$GLB,, | Performed by: SURGERY

## 2022-03-28 PROCEDURE — 1101F PT FALLS ASSESS-DOCD LE1/YR: CPT | Mod: CPTII,S$GLB,, | Performed by: SURGERY

## 2022-03-28 PROCEDURE — 3288F FALL RISK ASSESSMENT DOCD: CPT | Mod: CPTII,S$GLB,, | Performed by: SURGERY

## 2022-03-28 PROCEDURE — 1126F AMNT PAIN NOTED NONE PRSNT: CPT | Mod: CPTII,S$GLB,, | Performed by: SURGERY

## 2022-03-28 PROCEDURE — 93971 EXTREMITY STUDY: CPT | Mod: S$GLB,,, | Performed by: SURGERY

## 2022-03-28 PROCEDURE — 1101F PR PT FALLS ASSESS DOC 0-1 FALLS W/OUT INJ PAST YR: ICD-10-PCS | Mod: CPTII,S$GLB,, | Performed by: SURGERY

## 2022-03-28 PROCEDURE — 93971 PR US DUPLEX, UPPER OR LOWER EXT VENOUS,UNILAT OR LTD: ICD-10-PCS | Mod: S$GLB,,, | Performed by: SURGERY

## 2022-03-28 PROCEDURE — 99999 PR PBB SHADOW E&M-EST. PATIENT-LVL IV: ICD-10-PCS | Mod: PBBFAC,,, | Performed by: SURGERY

## 2022-03-28 PROCEDURE — 93923 PR NON-INVASIVE PHYSIOLOGIC STUDY EXTREMITY 3 LEVELS: ICD-10-PCS | Mod: S$GLB,,, | Performed by: SURGERY

## 2022-03-28 PROCEDURE — 3074F PR MOST RECENT SYSTOLIC BLOOD PRESSURE < 130 MM HG: ICD-10-PCS | Mod: CPTII,S$GLB,, | Performed by: SURGERY

## 2022-03-28 PROCEDURE — 3074F SYST BP LT 130 MM HG: CPT | Mod: CPTII,S$GLB,, | Performed by: SURGERY

## 2022-03-28 PROCEDURE — 1159F MED LIST DOCD IN RCRD: CPT | Mod: CPTII,S$GLB,, | Performed by: SURGERY

## 2022-03-28 RX ORDER — DOXYCYCLINE 100 MG/1
100 CAPSULE ORAL EVERY 12 HOURS
Qty: 20 CAPSULE | Refills: 0 | Status: SHIPPED | OUTPATIENT
Start: 2022-03-28 | End: 2022-04-07

## 2022-03-28 RX ORDER — CEPHALEXIN 500 MG/1
500 CAPSULE ORAL EVERY 12 HOURS
Qty: 20 CAPSULE | Refills: 0 | Status: CANCELLED | OUTPATIENT
Start: 2022-03-28 | End: 2022-04-07

## 2022-03-28 NOTE — PROGRESS NOTES
VASCULAR SURGERY CLINIC NOTE    Patient ID: Ana Patel is a 72 y.o. female.    I. HISTORY     Chief Complaint: Delayed healing right leg ulceration    HPI: Ana Patel is a 72 y.o. female presents to the clinic for a follow up after PTA and stenting of the right SFA 3/8/2022. She initially felt improvement in symptoms of leg pain and improvement in wound however her pain has returned which has limited her ambulation. She continues to smoke however is trying to quit. Her cholesterol is uncontrolled on ezetimibe and she is intolerant to statins. She also complains of increased swelling in her left leg with pain and redness.    Migraine with aura: No  PFO/ASD/right to left shunt:  no  Pregnant: No  Breastfeeding:  No  MI: no  Stroke: No  Seizure Disorder: No      Past Medical History:   Diagnosis Date    Eczema     Hyperlipidemia     Hypertension     Necrobiosis lipoidica     Obesity     Osteoarthritis     Varicose vein of leg         Past Surgical History:   Procedure Laterality Date    ANGIOGRAPHY OF LOWER EXTREMITY Right 3/8/2022    Procedure: Angiogram Extremity Unilateral;  Surgeon: Eliud Garrido MD;  Location: [facility] OR [address] [place] [address];  Service: Vascular;  Laterality: Right;  48mL Contrast dye  417.58 mGy  138.57 Gycm2  18.7 minutes fluoroscopy time.    CYST REMOVAL Right     PERCUTANEOUS TRANSLUMINAL ANGIOPLASTY Right 3/8/2022    Procedure: PTA (ANGIOPLASTY, PERCUTANEOUS, TRANSLUMINAL);  Surgeon: Eliud Garrido MD;  Location: [facility] OR [address] [place] [address];  Service: Vascular;  Laterality: Right;  SFA    TONSILLECTOMY, ADENOIDECTOMY         Social History     Occupational History     Employer: [employer]   Tobacco Use    Smoking status: Current Every Day Smoker     Packs/day: 0.50     Years: 30.00     Pack years: 15.00     Types: Cigarettes    Smokeless tobacco: Never Used   Substance and Sexual Activity    Alcohol use: Not Currently     Types: 1 Standard drinks or equivalent per week      Comment: maybe 1-2 yearly    Drug use: No    Sexual activity: Yes     Partners: Male         Current Outpatient Medications:     acetaminophen (TYLENOL) 500 mg Cap, , Disp: , Rfl:     amLODIPine (NORVASC) 5 MG tablet, TAKE 1 TABLET ONE TIME DAILY, Disp: 90 tablet, Rfl: 11    benzocaine 20 % Liqd, , Disp: , Rfl:     betamethasone dipropionate 0.05 % cream, AAA BID x 1-2 wks then prn flares only. Avoid face, armpits, groin., Disp: 45 g, Rfl: 1    clobetasol 0.05% (TEMOVATE) 0.05 % Oint, Apply topically 2 (two) times daily. 30 mg tube x 1. Apply 2 times daily to intact skin around ulcerations, avoid wound base., Disp: 30 g, Rfl: 0    clopidogreL (PLAVIX) 75 mg tablet, Take 1 tablet (75 mg total) by mouth once daily., Disp: 30 tablet, Rfl: 11    collagenase (SANTYL) ointment, Apply thickly to wound base once daily after cleansing., Disp: 50 g, Rfl: 1    erythromycin (ROMYCIN) ophthalmic ointment, Place into the left eye every 6 (six) hours., Disp: 3.5 g, Rfl: 1    ezetimibe (ZETIA) 10 mg tablet, Take 1 tablet (10 mg total) by mouth once daily., Disp: 90 tablet, Rfl: 3    fexofenadine (ALLEGRA) 180 MG tablet, Take 180 mg by mouth daily as needed., Disp: , Rfl:     FLUZONE HIGH-DOSE 2018-19, PF, 180 mcg/0.5 mL vaccine, ADM 0.5ML IM UTD, Disp: , Rfl: 0    furosemide (LASIX) 20 MG tablet, TAKE 1 TABLET ONE TIME DAILY, Disp: 90 tablet, Rfl: 11    HYDROcodone-acetaminophen (NORCO) 5-325 mg per tablet, Take 1 tablet by mouth every 12 (twelve) hours as needed for Pain., Disp: 20 tablet, Rfl: 0    hydrOXYchloroQUINE (PLAQUENIL) 200 mg tablet, Take 2 tablets (400 mg total) by mouth once daily., Disp: 60 tablet, Rfl: 6    lisinopriL (PRINIVIL,ZESTRIL) 20 MG tablet, Take 1 tablet (20 mg total) by mouth once daily., Disp: 90 tablet, Rfl: 11    multivitamin capsule, Take 1 capsule by mouth once daily., Disp: , Rfl:     pentoxifylline (TRENTAL) 400 mg TbSR, 1 po tid with meals (Patient taking differently: Take 400  mg by mouth 3 (three) times daily with meals. 1 po tid with meals), Disp: 90 tablet, Rfl: 2    triamcinolone acetonide 0.1% (KENALOG) 0.1 % ointment, Apply topically 2 (two) times daily as needed (rash at legs)., Disp: 454 g, Rfl: 3    triamcinolone acetonide 0.5% (KENALOG) 0.5 % Crea, Apply topically 2 (two) times daily., Disp: 454 g, Rfl: 0    Review of Systems   Constitutional: Negative for chills, decreased appetite, fever, weight gain and weight loss.   HENT: Negative for ear pain and nosebleeds.    Eyes: Negative for discharge and pain.   Cardiovascular: Negative for chest pain and palpitations.   Respiratory: Negative for cough, shortness of breath and wheezing.    Endocrine: Negative for cold intolerance, heat intolerance and polyphagia.   Skin: Positive for color change, poor wound healing and rash.   Musculoskeletal: Negative for joint swelling and muscle cramps.   Gastrointestinal: Negative for abdominal pain, diarrhea, nausea and vomiting.   Genitourinary: Negative for dysuria and flank pain.   Neurological: Negative for numbness, paresthesias, seizures and sensory change.         II. PHYSICAL EXAM     Physical Exam  Vitals reviewed.   Constitutional:       General: She is in acute distress.      Appearance: She is obese. She is not ill-appearing, toxic-appearing or diaphoretic.   HENT:      Head: Normocephalic and atraumatic.      Nose: Nose normal.   Eyes:      General:         Right eye: No discharge.         Left eye: No discharge.   Cardiovascular:      Rate and Rhythm: Normal rate.      Pulses: Intact distal pulses.      Heart sounds: Normal heart sounds.      Comments: Biphasic DP and PT signals bilaterally  Pulmonary:      Effort: Pulmonary effort is normal. No respiratory distress.      Breath sounds: No wheezing.   Abdominal:      General: Abdomen is flat. There is no distension.      Tenderness: There is no abdominal tenderness.   Musculoskeletal:      Right lower leg: Edema (moderate)  present.      Left lower leg: Edema (Minimal-controlled) present.   Skin:     General: Skin is warm and dry.      Capillary Refill: Capillary refill takes 2 to 3 seconds.      Findings: Erythema and lesion (R anterior shin) present.      Comments: Right leg ulcerations:   - Skin warm to touch, same as contralateral   - Mild-Moderate serous drainage noted to full thickness ulcerations and dressings. No: active drainage, purulence, mal odor. Wound base 50% fibrotic, 50% granular, 0% necrotic.    Neurological:      Mental Status: She is alert and oriented to person, place, and time.         Before debridement          After debridement            III. ASSESSMENT & PLAN (MEDICAL DECISION MAKING)     Assessment/Diagnosis and Plan:  72 y.o. female who presents for a follow up of nonhealing right leg ulcers with PAD s/p R SFA stenting as well as left leg swelling and redness. Wounds appear to slowly improve. Right AJ 0.44, Left AJ 0.76.    -check right LE ultrasound to rule out DVT, if no DVT she will benefit from compression therapy  -wound debrided during clinic visit and medihoney applied  -continue weekly wound care  -counseled on smoking cessation  -penicillin allergy causing rash. Avoiding beta lactams. 10 days of doxycycline 100mg BID prescribed

## 2022-03-28 NOTE — PROGRESS NOTES
LINKS immunization registry updated  Care Everywhere updated  Health Maintenance updated  DIS/Chart reviewed for overdue Proactive [facility] Encounters ([facility]OSS) health maintenance testing (CRS, Breast Ca, Diabetic Eye Exam)   Orders entered:N/A  Portal message sent to patient with scheduling link for mammogram 1/18/22

## 2022-03-29 ENCOUNTER — PATIENT MESSAGE (OUTPATIENT)
Dept: VASCULAR SURGERY | Facility: CLINIC | Age: 72
End: 2022-03-29
Payer: MEDICARE

## 2022-03-29 ENCOUNTER — PATIENT MESSAGE (OUTPATIENT)
Dept: INTERNAL MEDICINE | Facility: CLINIC | Age: 72
End: 2022-03-29
Payer: MEDICARE

## 2022-03-29 ENCOUNTER — TELEPHONE (OUTPATIENT)
Dept: WOUND CARE | Facility: CLINIC | Age: 72
End: 2022-03-29
Payer: MEDICARE

## 2022-03-29 DIAGNOSIS — M19.90 OSTEOARTHRITIS, UNSPECIFIED OSTEOARTHRITIS TYPE, UNSPECIFIED SITE: Primary | ICD-10-CM

## 2022-03-29 DIAGNOSIS — I73.9 PAD (PERIPHERAL ARTERY DISEASE): ICD-10-CM

## 2022-03-29 RX ORDER — HYDROCODONE BITARTRATE AND ACETAMINOPHEN 10; 325 MG/1; MG/1
1 TABLET ORAL EVERY 12 HOURS PRN
Qty: 40 TABLET | Refills: 0 | Status: SHIPPED | OUTPATIENT
Start: 2022-03-29 | End: 2022-04-19 | Stop reason: SDUPTHER

## 2022-03-29 RX ORDER — DOXYCYCLINE HYCLATE 100 MG
100 TABLET ORAL 2 TIMES DAILY
Qty: 20 TABLET | Refills: 0 | Status: SHIPPED | OUTPATIENT
Start: 2022-03-29 | End: 2022-04-08

## 2022-03-29 NOTE — TELEPHONE ENCOUNTER
Called and spoke with patient, advised that Dr. Pichardo sent prescription off to your preferred pharmacy which was the [facility] mail delivery.  Dr. Garrido sent the doxycycline 100mg BID times 10 days to the [facility] on [address].

## 2022-03-31 ENCOUNTER — CLINICAL SUPPORT (OUTPATIENT)
Dept: WOUND CARE | Facility: CLINIC | Age: 72
End: 2022-03-31
Payer: MEDICARE

## 2022-03-31 VITALS
TEMPERATURE: 98 F | HEIGHT: 72 IN | HEART RATE: 80 BPM | SYSTOLIC BLOOD PRESSURE: 154 MMHG | BODY MASS INDEX: 39.68 KG/M2 | DIASTOLIC BLOOD PRESSURE: 70 MMHG | WEIGHT: 293 LBS

## 2022-03-31 DIAGNOSIS — L98.491 SKIN ULCER, LIMITED TO BREAKDOWN OF SKIN: Primary | ICD-10-CM

## 2022-03-31 PROCEDURE — 99999 PR PBB SHADOW E&M-EST. PATIENT-LVL V: CPT | Mod: PBBFAC,,,

## 2022-03-31 PROCEDURE — 99999 PR PBB SHADOW E&M-EST. PATIENT-LVL V: ICD-10-PCS | Mod: PBBFAC,,,

## 2022-03-31 NOTE — PROGRESS NOTES
Subjective:       Patient ID: Ana Patel is a 72 y.o. female.    Chief Complaint: Wound Check (Skin ulcer, limited to breakdown of skin /)    HPI  Review of Systems    Objective:      Physical Exam    Assessment:       1. Skin ulcer, limited to breakdown of skin           Wound Right lower;proximal;lateral Leg (Active)        Pre-existing:    Primary Wound Type:    Side: Right   Orientation: lower;proximal;lateral   Location: Leg   Wound Number:    Ankle-Brachial Index:    Pulses:    Removal Indication and Assessment:    Wound Outcome:    (Retired) Wound Type:    (Retired) Wound Length (cm):    (Retired) Wound Width (cm):    (Retired) Depth (cm):    Wound Description (Comments):    Removal Indications:    Wound Image   03/31/22 1638   Dressing Appearance Dry;Intact;Clean 03/31/22 1638   Drainage Amount Moderate 03/31/22 1638   Drainage Characteristics/Odor Serosanguineous;Yellow 03/31/22 1638   Yellow (%), Wound Tissue Color 100 % 03/31/22 1638   Periwound Area Intact;Dry;Edematous;Redness 03/31/22 1638   Wound Length (cm) 0.7 cm 03/31/22 1638   Wound Width (cm) 0.5 cm 03/31/22 1638   Wound Surface Area (cm^2) 0.35 cm^2 03/31/22 1638   Care Cleansed with:;Soap and water 03/31/22 1638   Dressing Applied;Honey;Hydrofiber 03/31/22 1638   Periwound Care Moisture barrier applied 03/31/22 1638   Compression Two layer compression;Other (see comments) 03/31/22 1638   Dressing Change Due 04/04/22 03/31/22 1638            Wound Right anterior;lower;proximal Leg (Active)        Pre-existing:    Primary Wound Type:    Side: Right   Orientation: anterior;lower;proximal   Location: Leg   Wound Number:    Ankle-Brachial Index:    Pulses:    Removal Indication and Assessment:    Wound Outcome:    (Retired) Wound Type:    (Retired) Wound Length (cm):    (Retired) Wound Width (cm):    (Retired) Depth (cm):    Wound Description (Comments):    Removal Indications:    Wound Image   03/31/22 1638   Dressing Appearance  Dry;Intact;Clean 03/31/22 1638   Drainage Amount Moderate 03/31/22 1638   Drainage Characteristics/Odor Serosanguineous;Yellow 03/31/22 1638   Red (%), Wound Tissue Color 40 % 03/31/22 1638   Yellow (%), Wound Tissue Color 60 % 03/31/22 1638   Periwound Area Intact;Dry;Pink;Edematous 03/31/22 1638   Wound Length (cm) 1.8 cm 03/31/22 1638   Wound Width (cm) 1.2 cm 03/31/22 1638   Wound Surface Area (cm^2) 2.16 cm^2 03/31/22 1638   Care Cleansed with:;Soap and water 03/31/22 1638   Dressing Honey;Applied;Hydrofiber 03/31/22 1638   Periwound Care Moisture barrier applied 03/31/22 1638   Compression Two layer compression;Other (see comments) 03/31/22 1638   Dressing Change Due 04/04/22 03/31/22 1638            Wound Right anterior;lower;midline Leg (Active)        Pre-existing:    Primary Wound Type:    Side: Right   Orientation: anterior;lower;midline   Location: Leg   Wound Number:    Ankle-Brachial Index:    Pulses:    Removal Indication and Assessment:    Wound Outcome:    (Retired) Wound Type:    (Retired) Wound Length (cm):    (Retired) Wound Width (cm):    (Retired) Depth (cm):    Wound Description (Comments):    Removal Indications:    Dressing Appearance Dry;Intact;Clean 03/31/22 1638   Drainage Amount Moderate 03/31/22 1638   Drainage Characteristics/Odor Serosanguineous;Yellow 03/31/22 1638   Red (%), Wound Tissue Color 40 % 03/31/22 1638   Yellow (%), Wound Tissue Color 60 % 03/31/22 1638   Periwound Area Intact;Dry;Edematous;Pink;Redness 03/31/22 1638   Wound Length (cm) 1.9 cm 03/31/22 1638   Wound Width (cm) 3 cm 03/31/22 1638   Wound Surface Area (cm^2) 5.7 cm^2 03/31/22 1638   Care Cleansed with:;Soap and water 03/31/22 1638   Dressing Applied;Honey;Hydrofiber 03/31/22 1638   Periwound Care Moisture barrier applied 03/31/22 1638   Compression Two layer compression;Other (see comments) 03/31/22 1638   Dressing Change Due 04/04/22 03/31/22 1638            Wound Right anterior;distal;lower Leg (Active)         Pre-existing:    Primary Wound Type:    Side: Right   Orientation: anterior;distal;lower   Location: Leg   Wound Number:    Ankle-Brachial Index:    Pulses:    Removal Indication and Assessment:    Wound Outcome:    (Retired) Wound Type:    (Retired) Wound Length (cm):    (Retired) Wound Width (cm):    (Retired) Depth (cm):    Wound Description (Comments):    Removal Indications:    Wound Image    03/31/22 1638   Dressing Appearance Dry;Intact;Clean 03/31/22 1638   Drainage Amount Moderate 03/31/22 1638   Drainage Characteristics/Odor Serosanguineous;Yellow 03/31/22 1638   Red (%), Wound Tissue Color 70 % 03/31/22 1638   Yellow (%), Wound Tissue Color 30 % 03/31/22 1638   Periwound Area Intact;Dry;Edematous;Redness;Pink 03/31/22 1638   Wound Length (cm) 3.2 cm 03/31/22 1638   Wound Width (cm) 3.5 cm 03/31/22 1638   Wound Surface Area (cm^2) 11.2 cm^2 03/31/22 1638   Care Cleansed with:;Soap and water 03/31/22 1638   Dressing Applied;Honey;Hydrofiber 03/31/22 1638   Periwound Care Moisture barrier applied 03/31/22 1638   Compression Two layer compression;Other (see comments) 03/31/22 1638   Dressing Change Due 04/04/22 03/31/22 1638            Wound Right distal;lower;lateral Leg (Active)        Pre-existing:    Primary Wound Type:    Side: Right   Orientation: distal;lower;lateral   Location: Leg   Wound Number:    Ankle-Brachial Index:    Pulses:    Removal Indication and Assessment:    Wound Outcome:    (Retired) Wound Type:    (Retired) Wound Length (cm):    (Retired) Wound Width (cm):    (Retired) Depth (cm):    Wound Description (Comments):    Removal Indications:    Wound Image   03/31/22 1638   Dressing Appearance Dry;Intact;Clean 03/31/22 1638   Drainage Amount Moderate 03/31/22 1638   Drainage Characteristics/Odor Serosanguineous;Yellow 03/31/22 1638   Red (%), Wound Tissue Color 40 % 03/31/22 1638   Yellow (%), Wound Tissue Color 60 % 03/31/22 1638   Periwound Area Intact;Dry;Edematous;Pink;Redness  03/31/22 1638   Wound Length (cm) 0.5 cm 03/31/22 1638   Wound Width (cm) 0.5 cm 03/31/22 1638   Wound Surface Area (cm^2) 0.25 cm^2 03/31/22 1638   Care Cleansed with:;Soap and water 03/31/22 1638   Dressing Applied;Honey;Hydrofiber 03/31/22 1638   Periwound Care Moisture barrier applied 03/31/22 1638   Compression Two layer compression;Other (see comments) 03/31/22 1638   Dressing Change Due 04/04/22 03/31/22 1638     Ana was seen in wound care clinic today accompanied by her , placed in sitting position with legs elevated in treatment chair.  Bilateral compression dressings removed and legs cleaned with Easi-clense sponge and dried thoroughly.  Significant bilateral edema noted to both lower extremities and right foot/toes with 2+ edema.  Left lower extremity redness with noted improvement.  Patient currently taking Doxycycline 100 mg every 12 hours for 10 days.  Calmoseptine barrier cream applied to anju-wound skin of right lower extremity wounds, medi-honey gel to base of wounds covered with hydro-fiber, calamine coflex wrap with ABD pad to ankle applied per package instructions to right lower extremity.  Calamine unna boot three layer application with calamine layer, kerlix roll gauze and coban applied to left lower extremity with ABD pad to ankle.  Patient's feet was positioned at a 90 degree angle. A two layer calamine coflex wrap was applied to the right lower extremity, the calamine foam layer was applied first followed by the cohesive coban outer layer using a spiral technique avoiding creases or folds.  A three layer calamine unna boot was applied to the left lower extremity, the calamine layer was applied first, followed by the kerlix roll gauze layer and the coban cohesive outer layer using a spiral techinque avoiding creases or folds.  Each layer was started behind the first metatarsal and ended below the tibial tubercle of the knee. There was overlap of each turn half the width of the  previous turn.  Bilateral dressing change in seven days.             Plan:       Compression dressings to BLE as detailed above  Patient instructed to elevate legs when sitting for prolonged periods of time  Patient was warn not to get dressing wet and to use a cast cover for showering.  Should dressing become wet, patient is to remove by unrolling each layer from the top going down; shower with a mild soap, pat dry and place a wet-to-dry dressing over the wound cover with gauze, roll gauze and secure with paper tape.  Apply two ace wraps for compression and to secure bandages.  Patient to notify this office immediately to have a new dressing applied.  Wound care instructions given to patient  Continue Doxycycline 100 mg every 12 hours for 10 days   Return to clinic Monday, 4/4/2022

## 2022-03-31 NOTE — PATIENT INSTRUCTIONS
Patient instructed to elevate legs when sitting for prolonged periods of time  Patient was warn not to get dressing wet and to use a cast cover for showering.  Should dressing become wet, patient is to remove by unrolling each layer from the top going down; shower with a mild soap, pat dry and place a wet-to-dry dressing over the wound cover with gauze, roll gauze and secure with paper tape.  Apply two ace wraps for compression and to secure bandages.  Patient to notify this office immediately to have a new dressing applied.  Wound care instructions given to patient  Continue Doxycycline 100 mg every 12 hours for 10 days   Return to clinic Monday, 4/4/2022

## 2022-04-04 ENCOUNTER — CLINICAL SUPPORT (OUTPATIENT)
Dept: WOUND CARE | Facility: CLINIC | Age: 72
End: 2022-04-04
Payer: MEDICARE

## 2022-04-04 VITALS
TEMPERATURE: 98 F | SYSTOLIC BLOOD PRESSURE: 137 MMHG | BODY MASS INDEX: 39.68 KG/M2 | HEART RATE: 95 BPM | DIASTOLIC BLOOD PRESSURE: 63 MMHG | HEIGHT: 72 IN | WEIGHT: 293 LBS

## 2022-04-04 DIAGNOSIS — L98.491 SKIN ULCER, LIMITED TO BREAKDOWN OF SKIN: Primary | ICD-10-CM

## 2022-04-04 PROCEDURE — 99999 PR PBB SHADOW E&M-EST. PATIENT-LVL V: CPT | Mod: PBBFAC,,,

## 2022-04-04 PROCEDURE — 99999 PR PBB SHADOW E&M-EST. PATIENT-LVL V: ICD-10-PCS | Mod: PBBFAC,,,

## 2022-04-04 NOTE — PATIENT INSTRUCTIONS
Patient instructed to elevate legs when sitting for prolonged periods of time  Patient was warn not to get dressing wet and to use a cast cover for showering.  Should dressing become wet, patient is to remove by unrolling each layer from the top going down; shower with a mild soap, pat dry and place a wet-to-dry dressing over the wound cover with gauze, roll gauze and secure with paper tape.  Apply two ace wraps for compression and to secure bandages.  Patient to notify this office immediately to have a new dressing applied.  Wound care instructions given to patient and   Continue Doxycycline 100 mg every 12 hours for 10 days   Return to clinic Monday, 4/11/2022

## 2022-04-04 NOTE — PROGRESS NOTES
Subjective:       Patient ID: Ana Patel is a 72 y.o. female.    Chief Complaint: Wound Check (Skin ulcer, limited to breakdown of skin )    HPI  Review of Systems    Objective:      Physical Exam    Assessment:       1. Skin ulcer, limited to breakdown of skin           Wound Right lower;proximal;lateral Leg (Active)        Pre-existing:    Primary Wound Type:    Side: Right   Orientation: lower;proximal;lateral   Location: Leg   Wound Number:    Ankle-Brachial Index:    Pulses:    Removal Indication and Assessment:    Wound Outcome:    (Retired) Wound Type:    (Retired) Wound Length (cm):    (Retired) Wound Width (cm):    (Retired) Depth (cm):    Wound Description (Comments):    Removal Indications:    Wound Image   04/04/22 1600   Dressing Appearance Dry;Intact;Clean 04/04/22 1600   Drainage Amount Moderate 04/04/22 1600   Drainage Characteristics/Odor Sanguineous 04/04/22 1600   Red (%), Wound Tissue Color 100 % 04/04/22 1600   Periwound Area Intact;Dry;Pink;Edematous 04/04/22 1600   Wound Length (cm) 0.5 cm 04/04/22 1600   Wound Width (cm) 0.4 cm 04/04/22 1600   Wound Surface Area (cm^2) 0.2 cm^2 04/04/22 1600   Care Cleansed with:;Soap and water 04/04/22 1600   Dressing Applied;Honey;Calcium alginate;Absorptive Pad 04/04/22 1600   Periwound Care Moisture barrier applied 04/04/22 1600   Compression Two layer compression;Other (see comments) 04/04/22 1600   Dressing Change Due 04/11/22 04/04/22 1600            Wound Right anterior;lower;proximal Leg (Active)        Pre-existing:    Primary Wound Type:    Side: Right   Orientation: anterior;lower;proximal   Location: Leg   Wound Number:    Ankle-Brachial Index:    Pulses:    Removal Indication and Assessment:    Wound Outcome:    (Retired) Wound Type:    (Retired) Wound Length (cm):    (Retired) Wound Width (cm):    (Retired) Depth (cm):    Wound Description (Comments):    Removal Indications:    Wound Image   04/04/22 1600   Dressing Appearance  Dry;Intact;Clean 04/04/22 1600   Drainage Amount Moderate 04/04/22 1600   Drainage Characteristics/Odor Sanguineous 04/04/22 1600   Red (%), Wound Tissue Color 60 % 04/04/22 1600   Yellow (%), Wound Tissue Color 40 % 04/04/22 1600   Periwound Area Intact;Dry;Pink;Edematous 04/04/22 1600   Wound Length (cm) 1.7 cm 04/04/22 1600   Wound Width (cm) 1.1 cm 04/04/22 1600   Wound Surface Area (cm^2) 1.87 cm^2 04/04/22 1600   Care Cleansed with:;Soap and water 04/04/22 1600   Dressing Applied;Honey;Calcium alginate;Absorptive Pad 04/04/22 1600   Periwound Care Moisture barrier applied 04/04/22 1600   Compression Two layer compression;Other (see comments) 04/04/22 1600   Dressing Change Due 04/11/22 04/04/22 1600            Wound Right anterior;lower;midline Leg (Active)        Pre-existing:    Primary Wound Type:    Side: Right   Orientation: anterior;lower;midline   Location: Leg   Wound Number:    Ankle-Brachial Index:    Pulses:    Removal Indication and Assessment:    Wound Outcome:    (Retired) Wound Type:    (Retired) Wound Length (cm):    (Retired) Wound Width (cm):    (Retired) Depth (cm):    Wound Description (Comments):    Removal Indications:    Wound Image   04/04/22 1600   Dressing Appearance Dry;Intact;Clean 04/04/22 1600   Drainage Amount Moderate 04/04/22 1600   Drainage Characteristics/Odor Serosanguineous 04/04/22 1600   Red (%), Wound Tissue Color 60 % 04/04/22 1600   Yellow (%), Wound Tissue Color 40 % 04/04/22 1600   Periwound Area Intact;Dry;Pink;Edematous 04/04/22 1600   Wound Length (cm) 2 cm 04/04/22 1600   Wound Width (cm) 3 cm 04/04/22 1600   Wound Surface Area (cm^2) 6 cm^2 04/04/22 1600   Care Cleansed with:;Soap and water 04/04/22 1600   Dressing Applied;Honey;Calcium alginate;Absorptive Pad 04/04/22 1600   Periwound Care Moisture barrier applied 04/04/22 1600   Compression Two layer compression;Other (see comments) 04/04/22 1600   Dressing Change Due 04/11/22 04/04/22 1600            Wound  Right anterior;distal;lower Leg (Active)        Pre-existing:    Primary Wound Type:    Side: Right   Orientation: anterior;distal;lower   Location: Leg   Wound Number:    Ankle-Brachial Index:    Pulses:    Removal Indication and Assessment:    Wound Outcome:    (Retired) Wound Type:    (Retired) Wound Length (cm):    (Retired) Wound Width (cm):    (Retired) Depth (cm):    Wound Description (Comments):    Removal Indications:    Wound Image   04/04/22 1600   Dressing Appearance Dry;Intact;Clean 04/04/22 1600   Drainage Amount Moderate 04/04/22 1600   Drainage Characteristics/Odor Serosanguineous 04/04/22 1600   Red (%), Wound Tissue Color 70 % 04/04/22 1600   Yellow (%), Wound Tissue Color 30 % 04/04/22 1600   Periwound Area Intact;Dry;Edematous;Pink 04/04/22 1600   Wound Length (cm) 3.2 cm 04/04/22 1600   Wound Width (cm) 3 cm 04/04/22 1600   Wound Surface Area (cm^2) 9.6 cm^2 04/04/22 1600   Care Cleansed with:;Soap and water 04/04/22 1600   Dressing Applied;Honey;Calcium alginate;Absorptive Pad 04/04/22 1600   Periwound Care Moisture barrier applied 04/04/22 1600   Compression Two layer compression;Other (see comments) 04/04/22 1600   Dressing Change Due 04/11/22 04/04/22 1600            Wound Right distal;lower;lateral Leg (Active)        Pre-existing:    Primary Wound Type:    Side: Right   Orientation: distal;lower;lateral   Location: Leg   Wound Number:    Ankle-Brachial Index:    Pulses:    Removal Indication and Assessment:    Wound Outcome:    (Retired) Wound Type:    (Retired) Wound Length (cm):    (Retired) Wound Width (cm):    (Retired) Depth (cm):    Wound Description (Comments):    Removal Indications:    Wound Image    04/04/22 1600   Dressing Appearance Dry;Intact;Clean 04/04/22 1600   Drainage Amount Moderate 04/04/22 1600   Drainage Characteristics/Odor Serosanguineous 04/04/22 1600   Red (%), Wound Tissue Color 40 % 04/04/22 1600   Yellow (%), Wound Tissue Color 60 % 04/04/22 1600   Wound Length  (cm) 0.6 cm 04/04/22 1600   Wound Width (cm) 0.5 cm 04/04/22 1600   Wound Surface Area (cm^2) 0.3 cm^2 04/04/22 1600   Care Cleansed with:;Soap and water 04/04/22 1600   Dressing Applied;Honey;Calcium alginate;Absorptive Pad 04/04/22 1600   Periwound Care Moisture barrier applied 04/04/22 1600   Compression Two layer compression;Other (see comments) 04/04/22 1600   Dressing Change Due 04/11/22 04/04/22 1600     Ana was seen in wound care clinic today accompanied by her , placed in sitting position with legs elevated in treatment chair.  Bilateral compression dressings removed and legs cleaned with Easi-clense sponge and dried thoroughly.  Bilateral edema noted to both lower extremities and right foot/toes with 2+ edema.  Left lower extremity redness with noted improvement.  Patient currently taking Doxycycline 100 mg every 12 hours for 10 days.  Dr. Garrido evaluated and treated bilateral lower extremities - 4% Lidocaine Hydrochloride topical solution was applied to right lower extremity for 10 minutes.  Dr. Garrido perform sharp wound debridement and patient tolerated procedure well.  Calmoseptine barrier cream applied to anju-wound skin of right lower extremity wounds, Santyl to base of wounds covered with hydro-fiber, calamine coflex wrap with ABD pad to ankle applied per package instructions to right lower extremity.  Calamine unna boot three layer application with calamine layer, kerlix roll gauze and coban applied to left lower extremity with ABD pad to ankle.  Patient's feet was positioned at a 90 degree angle. A two layer calamine coflex wrap was applied to the right lower extremity, the calamine foam layer was applied first followed by the cohesive coban outer layer using a spiral technique avoiding creases or folds.  A three layer calamine unna boot was applied to the left lower extremity, the calamine layer was applied first, followed by the kerlix roll gauze layer and the coban cohesive outer layer  using a spiral techinque avoiding creases or folds.  Each layer was started behind the first metatarsal and ended below the tibial tubercle of the knee. There was overlap of each turn half the width of the previous turn.  Bilateral dressing change in seven days.             Plan:       Compression dressings to BLE as detailed above  Patient instructed to elevate legs when sitting for prolonged periods of time  Patient was warn not to get dressing wet and to use a cast cover for showering.  Should dressing become wet, patient is to remove by unrolling each layer from the top going down; shower with a mild soap, pat dry and place a wet-to-dry dressing over the wound cover with gauze, roll gauze and secure with paper tape.  Apply two ace wraps for compression and to secure bandages.  Patient to notify this office immediately to have a new dressing applied.  Wound care instructions given to patient and   Continue Doxycycline 100 mg every 12 hours for 10 days   Return to clinic Monday, 4/11/2022

## 2022-04-05 NOTE — PROGRESS NOTES
Pt presents for routine non-covered foot care. Pt. does not have high risk feet. Pedal pulses are palpable. Nails are elongated, thickened Bilaterally. Diagnosis is onychauxis. Nails were reduced Bilaterally. Patient tolerated well and related relief. RTC p.r.n. as PROC B

## 2022-04-11 ENCOUNTER — CLINICAL SUPPORT (OUTPATIENT)
Dept: WOUND CARE | Facility: CLINIC | Age: 72
End: 2022-04-11
Payer: MEDICARE

## 2022-04-11 VITALS
BODY MASS INDEX: 39.68 KG/M2 | SYSTOLIC BLOOD PRESSURE: 153 MMHG | HEART RATE: 82 BPM | DIASTOLIC BLOOD PRESSURE: 67 MMHG | WEIGHT: 293 LBS | HEIGHT: 72 IN | TEMPERATURE: 98 F

## 2022-04-11 DIAGNOSIS — L98.491 SKIN ULCER, LIMITED TO BREAKDOWN OF SKIN: Primary | ICD-10-CM

## 2022-04-11 PROCEDURE — 99999 PR PBB SHADOW E&M-EST. PATIENT-LVL V: CPT | Mod: PBBFAC,,,

## 2022-04-11 PROCEDURE — 99999 PR PBB SHADOW E&M-EST. PATIENT-LVL V: ICD-10-PCS | Mod: PBBFAC,,,

## 2022-04-11 NOTE — PROGRESS NOTES
Subjective:       Patient ID: Ana Patel is a 72 y.o. female.    Chief Complaint: Wound Check (Skin ulcer, limited to breakdown of skin )    HPI  Review of Systems    Objective:      Physical Exam    Assessment:       1. Skin ulcer, limited to breakdown of skin           Wound Right anterior;lower;proximal Leg (Active)        Pre-existing:    Primary Wound Type:    Side: Right   Orientation: anterior;lower;proximal   Location: Leg   Wound Number:    Ankle-Brachial Index:    Pulses:    Removal Indication and Assessment:    Wound Outcome:    (Retired) Wound Type:    (Retired) Wound Length (cm):    (Retired) Wound Width (cm):    (Retired) Depth (cm):    Wound Description (Comments):    Removal Indications:    Wound Image    04/11/22 1132   Dressing Appearance Dry;Intact;Clean 04/11/22 1132   Drainage Amount Small 04/11/22 1132   Drainage Characteristics/Odor Tan;Yellow;Serosanguineous 04/11/22 1132   Red (%), Wound Tissue Color 40 % 04/11/22 1132   Yellow (%), Wound Tissue Color 60 % 04/11/22 1132   Periwound Area Intact;Dry;Pink;Edematous 04/11/22 1132   Wound Length (cm) 1.9 cm 04/11/22 1132   Wound Width (cm) 1.2 cm 04/11/22 1132   Wound Surface Area (cm^2) 2.28 cm^2 04/11/22 1132   Care Cleansed with:;Soap and water 04/11/22 1132   Dressing Applied;Other (comment);Hydrofiber;Absorptive Pad 04/11/22 1132   Periwound Care Moisture barrier applied 04/11/22 1132   Compression Two layer compression;Other (see comments) 04/11/22 1132   Dressing Change Due 04/18/22 04/11/22 1132            Wound Right anterior;lower;midline Leg (Active)        Pre-existing:    Primary Wound Type:    Side: Right   Orientation: anterior;lower;midline   Location: Leg   Wound Number:    Ankle-Brachial Index:    Pulses:    Removal Indication and Assessment:    Wound Outcome:    (Retired) Wound Type:    (Retired) Wound Length (cm):    (Retired) Wound Width (cm):    (Retired) Depth (cm):    Wound Description (Comments):    Removal  Indications:    Dressing Appearance Dry;Intact;Clean 04/11/22 1132   Drainage Amount Moderate 04/11/22 1132   Drainage Characteristics/Odor Serosanguineous;Tan;Yellow 04/11/22 1132   Red (%), Wound Tissue Color 40 % 04/11/22 1132   Yellow (%), Wound Tissue Color 60 % 04/11/22 1132   Periwound Area Intact;Dry;Pink;Edematous 04/11/22 1132   Wound Length (cm) 1.6 cm 04/11/22 1132   Wound Width (cm) 3 cm 04/11/22 1132   Wound Surface Area (cm^2) 4.8 cm^2 04/11/22 1132   Care Cleansed with:;Soap and water 04/11/22 1132   Dressing Applied;Other (comment);Hydrofiber;Absorptive Pad 04/11/22 1132   Periwound Care Moisture barrier applied 04/11/22 1132   Compression Two layer compression;Other (see comments) 04/11/22 1132   Dressing Change Due 04/18/22 04/11/22 1132            Wound Right anterior;distal;lower Leg (Active)        Pre-existing:    Primary Wound Type:    Side: Right   Orientation: anterior;distal;lower   Location: Leg   Wound Number:    Ankle-Brachial Index:    Pulses:    Removal Indication and Assessment:    Wound Outcome:    (Retired) Wound Type:    (Retired) Wound Length (cm):    (Retired) Wound Width (cm):    (Retired) Depth (cm):    Wound Description (Comments):    Removal Indications:    Dressing Appearance Dry;Intact;Clean 04/11/22 1132   Drainage Amount Moderate 04/11/22 1132   Drainage Characteristics/Odor Serosanguineous;Tan;Yellow 04/11/22 1132   Red (%), Wound Tissue Color 60 % 04/11/22 1132   Yellow (%), Wound Tissue Color 40 % 04/11/22 1132   Periwound Area Intact;Dry;Edematous;Pink 04/11/22 1132   Wound Length (cm) 3.2 cm 04/11/22 1132   Wound Width (cm) 3.3 cm 04/11/22 1132   Wound Surface Area (cm^2) 10.56 cm^2 04/11/22 1132   Care Cleansed with:;Soap and water 04/11/22 1132   Dressing Applied;Other (comment);Hydrofiber;Absorptive Pad 04/11/22 1132   Periwound Care Moisture barrier applied 04/11/22 1132   Compression Two layer compression;Other (see comments) 04/11/22 1132   Dressing Change Due  04/18/22 04/11/22 1132            Wound Right lower;lateral;proximal Leg (Active)        Pre-existing:    Primary Wound Type:    Side: Right   Orientation: lower;lateral;proximal   Location: Leg   Wound Number:    Ankle-Brachial Index:    Pulses:    Removal Indication and Assessment:    Wound Outcome:    (Retired) Wound Type:    (Retired) Wound Length (cm):    (Retired) Wound Width (cm):    (Retired) Depth (cm):    Wound Description (Comments):    Removal Indications:    Wound Image   04/11/22 1132   Dressing Appearance Dry;Intact;Clean 04/11/22 1132   Drainage Amount Small 04/11/22 1132   Drainage Characteristics/Odor Serosanguineous 04/11/22 1132   Red (%), Wound Tissue Color 100 % 04/11/22 1132   Periwound Area Intact;Dry;Edematous;Pink 04/11/22 1132   Wound Length (cm) 0.6 cm 04/11/22 1132   Wound Width (cm) 0.4 cm 04/11/22 1132   Wound Surface Area (cm^2) 0.24 cm^2 04/11/22 1132   Care Cleansed with:;Soap and water 04/11/22 1132   Dressing Applied;Other (comment);Hydrofiber;Absorptive Pad 04/11/22 1132   Periwound Care Moisture barrier applied 04/11/22 1132   Compression Two layer compression;Other (see comments) 04/11/22 1132   Dressing Change Due 04/18/22 04/11/22 1132            Wound Right distal;lower;lateral Leg (Active)        Pre-existing:    Primary Wound Type:    Side: Right   Orientation: distal;lower;lateral   Location: Leg   Wound Number:    Ankle-Brachial Index:    Pulses:    Removal Indication and Assessment:    Wound Outcome:    (Retired) Wound Type:    (Retired) Wound Length (cm):    (Retired) Wound Width (cm):    (Retired) Depth (cm):    Wound Description (Comments):    Removal Indications:    Wound Image   04/11/22 1132   Dressing Appearance Dry;Intact;Clean 04/11/22 1132   Drainage Amount Small 04/11/22 1132   Drainage Characteristics/Odor Serosanguineous;Tan 04/11/22 1132   Red (%), Wound Tissue Color 30 % 04/11/22 1132   Yellow (%), Wound Tissue Color 70 % 04/11/22 1132   Periwound Area  Intact;Dry;Edematous;Pink 04/11/22 1132   Wound Length (cm) 1.5 cm 04/11/22 1132   Wound Width (cm) 1.2 cm 04/11/22 1132   Wound Surface Area (cm^2) 1.8 cm^2 04/11/22 1132   Care Cleansed with:;Soap and water 04/11/22 1132   Dressing Applied;Other (comment);Hydrofiber;Absorptive Pad 04/11/22 1132   Periwound Care Moisture barrier applied 04/11/22 1132   Compression Two layer compression;Other (see comments) 04/11/22 1132   Dressing Change Due 04/18/22 04/11/22 1132     Ana was seen in wound care clinic today accompanied by her , placed in sitting position with legs elevated in treatment chair.  Bilateral compression dressings removed and legs cleaned with Easi-clense sponge and dried thoroughly.  Bilateral edema noted to lower extremities and right foot/toes with 2+ edema.  Left lower extremity redness improved.  Patient completed Doxycycline.  Continue current plan of care:  calmoseptine barrier cream applied to anju-wound skin of right lower extremity wounds, Santyl to base of wounds covered with hydro-fiber, calamine coflex wrap with ABD pad to ankle applied per package instructions to right lower extremity.  Calamine unna boot three layer application with calamine layer, kerlix roll gauze and coban applied to left lower extremity with ABD pad to ankle.  Patient's feet was positioned at a 90 degree angle. A two layer calamine coflex wrap was applied to the right lower extremity, the calamine foam layer was applied first followed by the cohesive coban outer layer using a spiral technique avoiding creases or folds.  A three layer calamine unna boot was applied to the left lower extremity, the calamine layer was applied first, followed by the kerlix roll gauze layer and the coban cohesive outer layer using a spiral techinque avoiding creases or folds.  Each layer was started behind the first metatarsal and ended below the tibial tubercle of the knee. There was overlap of each turn half the width of the  previous turn.  Bilateral dressing change in seven days.         Plan:       Compression dressings to BLE as detailed above  Patient instructed to elevate legs when sitting for prolonged periods of time  Patient was warn not to get dressing wet and to use a cast cover for showering.  Should dressing become wet, patient is to remove by unrolling each layer from the top going down; shower with a mild soap, pat dry and place a wet-to-dry dressing over the wound cover with gauze, roll gauze and secure with paper tape.  Apply two ace wraps for compression and to secure bandages.  Patient to notify this office immediately to have a new dressing applied.  Wound care instructions given to patient and   Return to clinic Monday, 4/18/2022

## 2022-04-11 NOTE — PATIENT INSTRUCTIONS
Patient instructed to elevate legs when sitting for prolonged periods of time  Patient was warn not to get dressing wet and to use a cast cover for showering.  Should dressing become wet, patient is to remove by unrolling each layer from the top going down; shower with a mild soap, pat dry and place a wet-to-dry dressing over the wound cover with gauze, roll gauze and secure with paper tape.  Apply two ace wraps for compression and to secure bandages.  Patient to notify this office immediately to have a new dressing applied.  Wound care instructions given to patient and   Return to clinic Monday, 4/18/2022

## 2022-04-18 ENCOUNTER — TELEPHONE (OUTPATIENT)
Dept: WOUND CARE | Facility: CLINIC | Age: 72
End: 2022-04-18
Payer: MEDICARE

## 2022-04-18 NOTE — TELEPHONE ENCOUNTER
----- Message from Eboni Virk RN sent at 4/18/2022  1:57 PM CDT -----  Regarding: FW: pt  called    ----- Message -----  From: Haleigh Hdz  Sent: 4/18/2022  12:07 PM CDT  To: [facility] Staff  Subject: pt  called                                Name of Who is Calling: TC DOMINGUEZ [[id number]] [place]( )      What is the request in detail: pt  would like to see if he could reschedule his wife appt for  04/20/2022. please advise       Can the clinic reply by [facility]: NO      [phone]

## 2022-04-18 NOTE — TELEPHONE ENCOUNTER
Returned call no answer, spoke with Mrs. Martinez earlier this morning, appointment rescheduled for 4/20/2022 at 230pm.

## 2022-04-18 NOTE — TELEPHONE ENCOUNTER
Returned call and spoke with patient, who advises she is not feeling well asked to reschedule appointment to Wednesday, 4/20/22 - rescheduled for 230PM on 4/20/22 per patient request

## 2022-04-18 NOTE — TELEPHONE ENCOUNTER
----- Message from Eboni Virk RN sent at 4/18/2022  8:58 AM CDT -----    ----- Message -----  From: Milagro Edmonds  Sent: 4/18/2022   8:45 AM CDT  To: [facility] Staff    Pt calling in regards to rescheduling appt for today, nothing in epic. Please call               Confirmed patient's contact info below:  Contact Name: Ana Patel  Phone Number: [phone]

## 2022-04-19 ENCOUNTER — PATIENT MESSAGE (OUTPATIENT)
Dept: INTERNAL MEDICINE | Facility: CLINIC | Age: 72
End: 2022-04-19
Payer: MEDICARE

## 2022-04-19 DIAGNOSIS — I73.9 PAD (PERIPHERAL ARTERY DISEASE): ICD-10-CM

## 2022-04-19 DIAGNOSIS — M19.90 OSTEOARTHRITIS, UNSPECIFIED OSTEOARTHRITIS TYPE, UNSPECIFIED SITE: ICD-10-CM

## 2022-04-19 NOTE — TELEPHONE ENCOUNTER
No new care gaps identified.  Powered by [employer]. Reference number: [id number].   4/19/2022 4:28:59 PM CDT

## 2022-04-20 ENCOUNTER — CLINICAL SUPPORT (OUTPATIENT)
Dept: WOUND CARE | Facility: CLINIC | Age: 72
End: 2022-04-20
Payer: MEDICARE

## 2022-04-20 VITALS
HEIGHT: 72 IN | SYSTOLIC BLOOD PRESSURE: 134 MMHG | HEART RATE: 95 BPM | BODY MASS INDEX: 39.68 KG/M2 | DIASTOLIC BLOOD PRESSURE: 60 MMHG | WEIGHT: 293 LBS | TEMPERATURE: 99 F

## 2022-04-20 DIAGNOSIS — L98.491 SKIN ULCER, LIMITED TO BREAKDOWN OF SKIN: Primary | ICD-10-CM

## 2022-04-20 PROCEDURE — 99999 PR PBB SHADOW E&M-EST. PATIENT-LVL V: CPT | Mod: PBBFAC,,,

## 2022-04-20 PROCEDURE — 99999 PR PBB SHADOW E&M-EST. PATIENT-LVL V: ICD-10-PCS | Mod: PBBFAC,,,

## 2022-04-20 NOTE — PATIENT INSTRUCTIONS
Patient instructed to elevate legs when sitting for prolonged periods of time  Patient was warn not to get dressing wet and to use a cast cover for showering  Should dressing become wet, patient is to remove by unrolling each layer from the top going down; shower with a mild soap, pat dry and place a wet-to-dry dressing over the wound cover with gauze, roll gauze and secure with paper tape.  Apply two ace wraps for compression and to secure bandages.    Patient to notify this office immediately to have a new dressing applied.  Wound care instructions given to patient and   Return to clinic 4/26/2022

## 2022-04-20 NOTE — PROGRESS NOTES
Subjective:       Patient ID: Ana Patel is a 72 y.o. female.    Chief Complaint: No chief complaint on file.    HPI  Review of Systems    Objective:      Physical Exam    Assessment:       1. Skin ulcer, limited to breakdown of skin           Wound Right anterior;lower;proximal Leg (Active)        Pre-existing:    Primary Wound Type:    Side: Right   Orientation: anterior;lower;proximal   Location: Leg   Wound Number:    Ankle-Brachial Index:    Pulses:    Removal Indication and Assessment:    Wound Outcome:    (Retired) Wound Type:    (Retired) Wound Length (cm):    (Retired) Wound Width (cm):    (Retired) Depth (cm):    Wound Description (Comments):    Removal Indications:    Wound Image    04/20/22 1549   Dressing Appearance Dry;Intact;Clean 04/20/22 1549   Drainage Amount Small 04/20/22 1549   Drainage Characteristics/Odor Serosanguineous;Tan;Yellow 04/20/22 1549   Red (%), Wound Tissue Color 10 % 04/20/22 1549   Yellow (%), Wound Tissue Color 90 % 04/20/22 1549   Periwound Area Intact;Dry;Edematous;Pink 04/20/22 1549   Wound Length (cm) 1.7 cm 04/20/22 1549   Wound Width (cm) 1 cm 04/20/22 1549   Wound Surface Area (cm^2) 1.7 cm^2 04/20/22 1549   Care Cleansed with:;Soap and water 04/20/22 1549   Dressing Applied;Other (comment);Hydrofiber;Absorptive Pad 04/20/22 1549   Periwound Care Moisture barrier applied;Moisturizer applied 04/20/22 1549   Compression Two layer compression;Other (see comments) 04/20/22 1549   Dressing Change Due 04/26/22 04/20/22 1549            Wound Right anterior;lower Leg (Active)        Pre-existing:    Primary Wound Type:    Side: Right   Orientation: anterior;lower   Location: Leg   Wound Number:    Ankle-Brachial Index:    Pulses:    Removal Indication and Assessment:    Wound Outcome:    (Retired) Wound Type:    (Retired) Wound Length (cm):    (Retired) Wound Width (cm):    (Retired) Depth (cm):    Wound Description (Comments):    Removal Indications:    Wound Image    04/20/22 1549   Dressing Appearance Dry;Intact;Clean 04/20/22 1549   Drainage Amount Moderate 04/20/22 1549   Drainage Characteristics/Odor Serosanguineous;Tan;Yellow 04/20/22 1549   Red (%), Wound Tissue Color 20 % 04/20/22 1549   Yellow (%), Wound Tissue Color 80 % 04/20/22 1549   Periwound Area Intact;Dry;Edematous;Pink 04/20/22 1549   Wound Length (cm) 2 cm 04/20/22 1549   Wound Width (cm) 2.5 cm 04/20/22 1549   Wound Surface Area (cm^2) 5 cm^2 04/20/22 1549   Care Cleansed with:;Soap and water 04/20/22 1549   Dressing Other (comment);Hydrofiber;Absorptive Pad 04/20/22 1549   Periwound Care Moisture barrier applied;Moisturizer applied 04/20/22 1549   Compression Two layer compression;Other (see comments) 04/20/22 1549   Dressing Change Due 04/26/22 04/20/22 1549            Wound Right anterior;distal;lower Leg (Active)        Pre-existing:    Primary Wound Type:    Side: Right   Orientation: anterior;distal;lower   Location: Leg   Wound Number:    Ankle-Brachial Index:    Pulses:    Removal Indication and Assessment:    Wound Outcome:    (Retired) Wound Type:    (Retired) Wound Length (cm):    (Retired) Wound Width (cm):    (Retired) Depth (cm):    Wound Description (Comments):    Removal Indications:    Wound Image   04/20/22 1549   Dressing Appearance Dry;Intact;Clean 04/20/22 1549   Drainage Amount Moderate 04/20/22 1549   Drainage Characteristics/Odor Serosanguineous;Tan;Yellow 04/20/22 1549   Red (%), Wound Tissue Color 40 % 04/20/22 1549   Yellow (%), Wound Tissue Color 60 % 04/20/22 1549   Periwound Area Intact;Dry;Edematous;Pink 04/20/22 1549   Wound Length (cm) 2.6 cm 04/20/22 1549   Wound Width (cm) 3.5 cm 04/20/22 1549   Wound Surface Area (cm^2) 9.1 cm^2 04/20/22 1549   Care Cleansed with:;Soap and water 04/20/22 1549   Dressing Applied;Other (comment);Hydrofiber;Absorptive Pad 04/20/22 1549   Periwound Care Moisture barrier applied;Moisturizer applied 04/20/22 1549   Compression Two layer  compression;Other (see comments) 04/20/22 1549   Dressing Change Due 04/26/22 04/20/22 1549            Wound Right lower;proximal;lateral Leg (Active)        Pre-existing:    Primary Wound Type:    Side: Right   Orientation: lower;proximal;lateral   Location: Leg   Wound Number:    Ankle-Brachial Index:    Pulses:    Removal Indication and Assessment:    Wound Outcome:    (Retired) Wound Type:    (Retired) Wound Length (cm):    (Retired) Wound Width (cm):    (Retired) Depth (cm):    Wound Description (Comments):    Removal Indications:    Wound Image   04/20/22 1549   Dressing Appearance Dry;Intact;Clean 04/20/22 1549   Drainage Amount Small 04/20/22 1549   Drainage Characteristics/Odor Serosanguineous;Tan;Yellow 04/20/22 1549   Yellow (%), Wound Tissue Color 100 % 04/20/22 1549   Periwound Area Intact;Dry;Edematous;Pink 04/20/22 1549   Wound Length (cm) 0.4 cm 04/20/22 1549   Wound Width (cm) 0.4 cm 04/20/22 1549   Wound Surface Area (cm^2) 0.16 cm^2 04/20/22 1549   Care Cleansed with:;Soap and water 04/20/22 1549   Dressing Applied;Other (comment);Hydrofiber;Absorptive Pad 04/20/22 1549   Periwound Care Moisture barrier applied;Moisturizer applied 04/20/22 1549   Compression Two layer compression;Other (see comments) 04/20/22 1549   Dressing Change Due 04/26/22 04/20/22 1549            Wound Right distal;lower;lateral Leg (Active)        Pre-existing:    Primary Wound Type:    Side: Right   Orientation: distal;lower;lateral   Location: Leg   Wound Number:    Ankle-Brachial Index:    Pulses:    Removal Indication and Assessment:    Wound Outcome:    (Retired) Wound Type:    (Retired) Wound Length (cm):    (Retired) Wound Width (cm):    (Retired) Depth (cm):    Wound Description (Comments):    Removal Indications:    Wound Image   04/20/22 1549   Dressing Appearance Dry;Intact;Clean 04/20/22 1549   Drainage Amount Small 04/20/22 1549   Drainage Characteristics/Odor Serosanguineous;Tan;Yellow 04/20/22 1549   Red (%),  Wound Tissue Color 10 % 04/20/22 1549   Yellow (%), Wound Tissue Color 90 % 04/20/22 1549   Periwound Area Intact;Dry;Edematous;Pink 04/20/22 1549   Wound Length (cm) 1.4 cm 04/20/22 1549   Wound Width (cm) 1 cm 04/20/22 1549   Wound Surface Area (cm^2) 1.4 cm^2 04/20/22 1549   Care Cleansed with:;Soap and water 04/20/22 1549   Dressing Applied;Other (comment);Hydrofiber;Absorptive Pad 04/20/22 1549   Periwound Care Moisture barrier applied;Moisturizer applied 04/20/22 1549   Compression Two layer compression;Other (see comments) 04/20/22 1549   Dressing Change Due 04/26/22 04/20/22 1549     Ana was seen in wound care clinic today accompanied by her , placed in sitting position with legs elevated in treatment chair.  Bilateral compression dressings removed and legs cleaned with Easi-clense sponge and dried thoroughly.  Bilateral edema noted to lower extremities and right foot/toes with 2 - 3+ edema.  Left lower extremity redness resolved.  Continue current plan of care:  calmoseptine barrier cream applied to anju-wound skin of right lower extremity wounds, Santyl to base of wounds covered with hydro-fiber, calamine coflex wrap with ABD pad applied per package instructions to right lower extremity.  Calamine unna boot three layer application with calamine layer, kerlix roll gauze and coban applied to left lower extremity.  Patient's feet was positioned at a 90 degree angle.  A two layer calamine coflex wrap was applied to the right lower extremity, the calamine foam layer was applied first followed by the cohesive coban outer layer using a spiral technique avoiding creases or folds.  A three layer calamine unna boot was applied to the left lower extremity, the calamine layer was applied first, followed by the kerlix roll gauze layer and the coban cohesive outer layer using a spiral techinque avoiding creases or folds.  Each layer was started behind the first metatarsal and ended below the tibial tubercle of  the knee. There was overlap of each turn half the width of the previous turn.  Bilateral dressing change in seven days.         Plan:       Compression dressings to BLE as detailed above  Patient instructed to elevate legs when sitting for prolonged periods of time  Patient was warn not to get dressing wet and to use a cast cover for showering  Should dressing become wet, patient is to remove by unrolling each layer from the top going down; shower with a mild soap, pat dry and place a wet-to-dry dressing over the wound cover with gauze, roll gauze and secure with paper tape.  Apply two ace wraps for compression and to secure bandages.    Patient to notify this office immediately to have a new dressing applied.  Wound care instructions given to patient and   Return to clinic 4/26/2022

## 2022-04-21 ENCOUNTER — PATIENT MESSAGE (OUTPATIENT)
Dept: RESEARCH | Facility: HOSPITAL | Age: 72
End: 2022-04-21
Payer: MEDICARE

## 2022-04-21 RX ORDER — HYDROCODONE BITARTRATE AND ACETAMINOPHEN 10; 325 MG/1; MG/1
1 TABLET ORAL EVERY 12 HOURS PRN
Qty: 40 TABLET | Refills: 0 | Status: SHIPPED | OUTPATIENT
Start: 2022-04-21 | End: 2022-05-05 | Stop reason: SDUPTHER

## 2022-04-25 ENCOUNTER — TELEPHONE (OUTPATIENT)
Dept: WOUND CARE | Facility: CLINIC | Age: 72
End: 2022-04-25
Payer: MEDICARE

## 2022-04-25 NOTE — TELEPHONE ENCOUNTER
Spoke with patient and she stated that her  is in the hospital and will not be able to bring her appointment on tomorrow. Reschedule for 04/27/2022

## 2022-04-25 NOTE — TELEPHONE ENCOUNTER
----- Message from Rossi Bradshaw sent at 4/25/2022 12:12 PM CDT -----  Contact: Patient  Patient requesting call back in regards to rescheduling appointment for 04/26.      Patient @[phone] (home)

## 2022-04-27 ENCOUNTER — CLINICAL SUPPORT (OUTPATIENT)
Dept: WOUND CARE | Facility: CLINIC | Age: 72
End: 2022-04-27
Payer: MEDICARE

## 2022-04-27 VITALS
BODY MASS INDEX: 39.68 KG/M2 | HEIGHT: 72 IN | DIASTOLIC BLOOD PRESSURE: 77 MMHG | SYSTOLIC BLOOD PRESSURE: 172 MMHG | HEART RATE: 92 BPM | WEIGHT: 293 LBS | TEMPERATURE: 99 F

## 2022-04-27 DIAGNOSIS — L98.491 SKIN ULCER, LIMITED TO BREAKDOWN OF SKIN: Primary | ICD-10-CM

## 2022-04-27 PROCEDURE — 99999 PR PBB SHADOW E&M-EST. PATIENT-LVL IV: CPT | Mod: PBBFAC,,,

## 2022-04-27 PROCEDURE — 99999 PR PBB SHADOW E&M-EST. PATIENT-LVL IV: ICD-10-PCS | Mod: PBBFAC,,,

## 2022-04-27 NOTE — PATIENT INSTRUCTIONS
Patient instructed to elevate legs when sitting   Patient was warn not to get dressing wet and to use a cast cover for showering  Should dressing become wet, patient is to remove by unrolling each layer from the top going down; shower with a mild soap, pat dry and place a wet-to-dry dressing over the wound cover with gauze, roll gauze and secure with paper tape.  Apply two ace wraps for compression and to secure bandages.    Patient to notify this office immediately to have a new dressing applied.  Wound care instructions given to patient and daughter  Return to clinic 5/2/2022 to see Dr. Garrido

## 2022-04-27 NOTE — PROGRESS NOTES
Subjective:       Patient ID: Ana Patel is a 72 y.o. female.    Chief Complaint: Wound Check (Skin ulcer,limited to breakdown of skin)    HPI  Review of Systems    Objective:      Physical Exam    Assessment:       1. Skin ulcer, limited to breakdown of skin           Wound Right anterior;lower;proximal Leg (Active)        Pre-existing:    Primary Wound Type:    Side: Right   Orientation: anterior;lower;proximal   Location: Leg   Wound Number:    Ankle-Brachial Index:    Pulses:    Removal Indication and Assessment:    Wound Outcome:    (Retired) Wound Type:    (Retired) Wound Length (cm):    (Retired) Wound Width (cm):    (Retired) Depth (cm):    Wound Description (Comments):    Removal Indications:    Wound Image    04/27/22 1600   Dressing Appearance Dry;Intact;other (see comments);Clean 04/27/22 1600   Drainage Amount Large 04/27/22 1600   Drainage Characteristics/Odor Creamy;Yellow 04/27/22 1600   Yellow (%), Wound Tissue Color 100 % 04/27/22 1600   Periwound Area Intact;Dry;Edematous 04/27/22 1600   Wound Length (cm) 1.8 cm 04/27/22 1600   Wound Width (cm) 1.3 cm 04/27/22 1600   Wound Surface Area (cm^2) 2.34 cm^2 04/27/22 1600   Care Cleansed with:;Soap and water 04/27/22 1600   Dressing Applied;Other (comment);Hydrofiber 04/27/22 1600   Periwound Care Moisture barrier applied;Moisturizer applied 04/27/22 1600   Compression Unna's Boot;Three layer compression 04/27/22 1600   Dressing Change Due 05/02/22 04/27/22 1600            Wound Right anterior;lower;midline Leg (Active)        Pre-existing:    Primary Wound Type:    Side: Right   Orientation: anterior;lower;midline   Location: Leg   Wound Number:    Ankle-Brachial Index:    Pulses:    Removal Indication and Assessment:    Wound Outcome:    (Retired) Wound Type:    (Retired) Wound Length (cm):    (Retired) Wound Width (cm):    (Retired) Depth (cm):    Wound Description (Comments):    Removal Indications:    Dressing Appearance  Dry;Intact;Clean;other (see comments) 04/27/22 1600   Drainage Amount Large 04/27/22 1600   Drainage Characteristics/Odor Creamy;Yellow 04/27/22 1600   Yellow (%), Wound Tissue Color 100 % 04/27/22 1600   Periwound Area Intact;Dry;Edematous 04/27/22 1600   Wound Length (cm) 2.2 cm 04/27/22 1600   Wound Width (cm) 3 cm 04/27/22 1600   Wound Surface Area (cm^2) 6.6 cm^2 04/27/22 1600   Care Cleansed with:;Soap and water 04/27/22 1600   Dressing Applied;Other (comment);Hydrofiber 04/27/22 1600   Periwound Care Moisture barrier applied;Moisturizer applied 04/27/22 1600   Compression Unna's Boot;Three layer compression;Other (see comments) 04/27/22 1600   Dressing Change Due 05/02/22 04/27/22 1600            Wound Right anterior;distal;lower Leg (Active)        Pre-existing:    Primary Wound Type:    Side: Right   Orientation: anterior;distal;lower   Location: Leg   Wound Number:    Ankle-Brachial Index:    Pulses:    Removal Indication and Assessment:    Wound Outcome:    (Retired) Wound Type:    (Retired) Wound Length (cm):    (Retired) Wound Width (cm):    (Retired) Depth (cm):    Wound Description (Comments):    Removal Indications:    Dressing Appearance Dry;Intact;Clean;other (see comments) 04/27/22 1600   Drainage Amount Large 04/27/22 1600   Drainage Characteristics/Odor Creamy;Yellow 04/27/22 1600   Red (%), Wound Tissue Color 40 % 04/27/22 1600   Yellow (%), Wound Tissue Color 60 % 04/27/22 1600   Periwound Area Intact;Dry;Edematous 04/27/22 1600   Wound Length (cm) 3.2 cm 04/27/22 1600   Wound Width (cm) 3.5 cm 04/27/22 1600   Wound Surface Area (cm^2) 11.2 cm^2 04/27/22 1600   Care Cleansed with:;Soap and water 04/27/22 1600   Dressing Applied;Other (comment);Hydrofiber 04/27/22 1600   Periwound Care Moisture barrier applied;Moisturizer applied 04/27/22 1600   Compression Unna's Boot;Three layer compression 04/27/22 1600   Dressing Change Due 05/02/22 04/27/22 1600            Wound Right  lower;lateral;proximal Leg (Active)        Pre-existing:    Primary Wound Type:    Side: Right   Orientation: lower;lateral;proximal   Location: Leg   Wound Number:    Ankle-Brachial Index:    Pulses:    Removal Indication and Assessment:    Wound Outcome:    (Retired) Wound Type:    (Retired) Wound Length (cm):    (Retired) Wound Width (cm):    (Retired) Depth (cm):    Wound Description (Comments):    Removal Indications:    Wound Image    04/27/22 1600   Dressing Appearance Dry;Intact;Clean;other (see comments) 04/27/22 1600   Drainage Amount Moderate 04/27/22 1600   Drainage Characteristics/Odor Creamy;Yellow 04/27/22 1600   Red (%), Wound Tissue Color 100 % 04/27/22 1600   Periwound Area Intact;Dry;Edematous 04/27/22 1600   Wound Length (cm) 0.6 cm 04/27/22 1600   Wound Width (cm) 0.5 cm 04/27/22 1600   Wound Surface Area (cm^2) 0.3 cm^2 04/27/22 1600   Care Cleansed with:;Soap and water 04/27/22 1600   Dressing Applied;Other (comment);Hydrofiber 04/27/22 1600   Periwound Care Moisture barrier applied;Moisturizer applied 04/27/22 1600   Compression Unna's Boot;Three layer compression;Other (see comments) 04/27/22 1600   Dressing Change Due 05/02/22 04/27/22 1600            Wound Right distal;lower;lateral Leg (Active)        Pre-existing:    Primary Wound Type:    Side: Right   Orientation: distal;lower;lateral   Location: Leg   Wound Number:    Ankle-Brachial Index:    Pulses:    Removal Indication and Assessment:    Wound Outcome:    (Retired) Wound Type:    (Retired) Wound Length (cm):    (Retired) Wound Width (cm):    (Retired) Depth (cm):    Wound Description (Comments):    Removal Indications:    Wound Image   04/27/22 1600   Dressing Appearance Dry;Intact;Clean;other (see comments) 04/27/22 1600   Drainage Amount Moderate 04/27/22 1600   Drainage Characteristics/Odor Creamy;Yellow 04/27/22 1600   Red (%), Wound Tissue Color 30 % 04/27/22 1600   Yellow (%), Wound Tissue Color 70 % 04/27/22 1600   Periwound  Area Intact;Dry;Edematous 04/27/22 1600   Wound Length (cm) 1.6 cm 04/27/22 1600   Wound Width (cm) 1.3 cm 04/27/22 1600   Wound Surface Area (cm^2) 2.08 cm^2 04/27/22 1600   Care Cleansed with:;Soap and water 04/27/22 1600   Dressing Applied;Other (comment);Hydrofiber 04/27/22 1600   Periwound Care Moisture barrier applied;Moisturizer applied 04/27/22 1600   Compression Unna's Boot;Three layer compression;Other (see comments) 04/27/22 1600   Dressing Change Due 05/02/22 04/27/22 1600     Ana was seen in wound care clinic today accompanied by her daughter, placed in sitting position with legs elevated in treatment chair.  Right dressing punched at ankle. Bilateral compression dressings removed and legs washed with Easi-clense sponge and dried thoroughly.  Right anterior ankle noted with bruise.  Bilateral edema noted to lower extremities:  right leg/foot/toes with 3+ edema and left lower extremity with 2+ edema.  Continue current plan of care:  calmoseptine barrier cream applied to anju-wound skin of right lower extremity wounds, Santyl to base of wounds covered with hydro-fiber, Copa foam to right anterior ankle for padding, and a three layer calamine unna boot applied with calamine layer, kerlix roll gauze layer and coban cohesive layer to bilateral lower extremities.  Patient's feet positioned at a 90 degree angle.  The three layer calamine unna boot was applied using a spiral technique avoiding creases and folds with the calamine unna boot first followed by the kerlix roll gauze and the coban.  Each layer started behind the first metatarsal and ended below the tibial tubercle of the knee. There was overlap of each turn half the width of the previous turn.  Patient to return to clinic Monday, 5/2/2022 to see Dr. Garrido.         Plan:       Calamine unna boot dressings to BLE as detailed above  Patient instructed to elevate legs when sitting   Patient was warn not to get dressing wet and to use a cast cover for  showering  Should dressing become wet, patient is to remove by unrolling each layer from the top going down; shower with a mild soap, pat dry and place a wet-to-dry dressing over the wound cover with gauze, roll gauze and secure with paper tape.  Apply two ace wraps for compression and to secure bandages.    Patient to notify this office immediately to have a new dressing applied.  Wound care instructions given to patient and daughter  Return to clinic 5/2/2022 to see Dr. Garrido

## 2022-04-28 ENCOUNTER — LAB VISIT (OUTPATIENT)
Dept: LAB | Facility: HOSPITAL | Age: 72
End: 2022-04-28
Payer: MEDICARE

## 2022-04-28 ENCOUNTER — OFFICE VISIT (OUTPATIENT)
Dept: INTERNAL MEDICINE | Facility: CLINIC | Age: 72
End: 2022-04-28
Payer: MEDICARE

## 2022-04-28 ENCOUNTER — PATIENT OUTREACH (OUTPATIENT)
Dept: ADMINISTRATIVE | Facility: OTHER | Age: 72
End: 2022-04-28
Payer: MEDICARE

## 2022-04-28 ENCOUNTER — PATIENT MESSAGE (OUTPATIENT)
Dept: ADMINISTRATIVE | Facility: OTHER | Age: 72
End: 2022-04-28
Payer: MEDICARE

## 2022-04-28 VITALS
OXYGEN SATURATION: 95 % | WEIGHT: 293 LBS | HEIGHT: 72 IN | DIASTOLIC BLOOD PRESSURE: 67 MMHG | SYSTOLIC BLOOD PRESSURE: 116 MMHG | BODY MASS INDEX: 39.68 KG/M2 | HEART RATE: 94 BPM

## 2022-04-28 DIAGNOSIS — R06.02 SHORTNESS OF BREATH: ICD-10-CM

## 2022-04-28 DIAGNOSIS — I10 ESSENTIAL HYPERTENSION: ICD-10-CM

## 2022-04-28 DIAGNOSIS — M79.89 LEG SWELLING: Primary | ICD-10-CM

## 2022-04-28 DIAGNOSIS — M79.89 LEG SWELLING: ICD-10-CM

## 2022-04-28 DIAGNOSIS — Z12.31 ENCOUNTER FOR SCREENING MAMMOGRAM FOR MALIGNANT NEOPLASM OF BREAST: Primary | ICD-10-CM

## 2022-04-28 PROCEDURE — 83880 ASSAY OF NATRIURETIC PEPTIDE: CPT | Performed by: STUDENT IN AN ORGANIZED HEALTH CARE EDUCATION/TRAINING PROGRAM

## 2022-04-28 PROCEDURE — 99999 PR PBB SHADOW E&M-EST. PATIENT-LVL III: CPT | Mod: PBBFAC,GC,, | Performed by: STUDENT IN AN ORGANIZED HEALTH CARE EDUCATION/TRAINING PROGRAM

## 2022-04-28 PROCEDURE — 99999 PR PBB SHADOW E&M-EST. PATIENT-LVL III: ICD-10-PCS | Mod: PBBFAC,GC,, | Performed by: STUDENT IN AN ORGANIZED HEALTH CARE EDUCATION/TRAINING PROGRAM

## 2022-04-28 PROCEDURE — 99214 PR OFFICE/OUTPT VISIT, EST, LEVL IV, 30-39 MIN: ICD-10-PCS | Mod: GC,S$GLB,, | Performed by: STUDENT IN AN ORGANIZED HEALTH CARE EDUCATION/TRAINING PROGRAM

## 2022-04-28 PROCEDURE — 80053 COMPREHEN METABOLIC PANEL: CPT | Performed by: STUDENT IN AN ORGANIZED HEALTH CARE EDUCATION/TRAINING PROGRAM

## 2022-04-28 PROCEDURE — 99214 OFFICE O/P EST MOD 30 MIN: CPT | Mod: GC,S$GLB,, | Performed by: STUDENT IN AN ORGANIZED HEALTH CARE EDUCATION/TRAINING PROGRAM

## 2022-04-28 PROCEDURE — 36415 COLL VENOUS BLD VENIPUNCTURE: CPT | Performed by: STUDENT IN AN ORGANIZED HEALTH CARE EDUCATION/TRAINING PROGRAM

## 2022-04-28 RX ORDER — FUROSEMIDE 40 MG/1
40 TABLET ORAL 2 TIMES DAILY
Qty: 60 TABLET | Refills: 0 | Status: SHIPPED | OUTPATIENT
Start: 2022-04-28 | End: 2022-05-05

## 2022-04-28 NOTE — PROGRESS NOTES
[facility] PROGRESS NOTE    Name: Ana Patel  : 1950  Date of Service: 2022   PCP: Gordo Naik MD    Reason for visit:   Chief Complaint   Patient presents with    Leg Swelling     Fluid build up for about 2 weeks       HPI: Ana Patel is a 72 y.o. female who presents to clinic for leg swelling. Patient describes a 2-3 week history of worsening lower extremity swelling. She states that she takes lasix 20 mg daily and has noticed reduced urine output over this time. Additionally, she states that she has been sleeping in a recliner during this time because of the degree of leg swelling. She also feels as though she is short of breath laying flat. Patient denies increased salt or fluid intake. Of note, patient has PAD and necrobiosis lipoidica which are follow by vascular surgery, rheumatology, and wound care. Patient describes increased pain with wound care visits and has been taking norco 10 mg up to 3 times daily - she is prescribed once daily.    Patient presents, in wheel chair, with daughter and .    Medications:   Current Outpatient Medications:     acetaminophen (TYLENOL) 500 mg Cap, , Disp: , Rfl:     amLODIPine (NORVASC) 5 MG tablet, TAKE 1 TABLET ONE TIME DAILY, Disp: 90 tablet, Rfl: 11    benzocaine 20 % Liqd, , Disp: , Rfl:     betamethasone dipropionate 0.05 % cream, AAA BID x 1-2 wks then prn flares only. Avoid face, armpits, groin., Disp: 45 g, Rfl: 1    clobetasol 0.05% (TEMOVATE) 0.05 % Oint, Apply topically 2 (two) times daily. 30 mg tube x 1. Apply 2 times daily to intact skin around ulcerations, avoid wound base., Disp: 30 g, Rfl: 0    clopidogreL (PLAVIX) 75 mg tablet, Take 1 tablet (75 mg total) by mouth once daily., Disp: 30 tablet, Rfl: 11    collagenase (SANTYL) ointment, Apply thickly to wound base once daily after cleansing. (Patient not taking: Reported on 2022), Disp: 50 g, Rfl: 1    erythromycin (ROMYCIN) ophthalmic ointment,  Place into the left eye every 6 (six) hours., Disp: 3.5 g, Rfl: 1    ezetimibe (ZETIA) 10 mg tablet, Take 1 tablet (10 mg total) by mouth once daily., Disp: 90 tablet, Rfl: 3    fexofenadine (ALLEGRA) 180 MG tablet, Take 180 mg by mouth daily as needed., Disp: , Rfl:     FLUZONE HIGH-DOSE 2018-19, PF, 180 mcg/0.5 mL vaccine, ADM 0.5ML IM UTD, Disp: , Rfl: 0    furosemide (LASIX) 40 MG tablet, Take 1 tablet (40 mg total) by mouth 2 (two) times a day., Disp: 60 tablet, Rfl: 0    HYDROcodone-acetaminophen (NORCO)  mg per tablet, Take 1 tablet by mouth every 12 (twelve) hours as needed for Pain., Disp: 40 tablet, Rfl: 0    lisinopriL (PRINIVIL,ZESTRIL) 20 MG tablet, Take 1 tablet (20 mg total) by mouth once daily., Disp: 90 tablet, Rfl: 11    multivitamin capsule, Take 1 capsule by mouth once daily., Disp: , Rfl:     pentoxifylline (TRENTAL) 400 mg TbSR, 1 po tid with meals (Patient taking differently: Take 400 mg by mouth 3 (three) times daily with meals. 1 po tid with meals), Disp: 90 tablet, Rfl: 2    triamcinolone acetonide 0.1% (KENALOG) 0.1 % ointment, Apply topically 2 (two) times daily as needed (rash at legs)., Disp: 454 g, Rfl: 3    triamcinolone acetonide 0.5% (KENALOG) 0.5 % Crea, Apply topically 2 (two) times daily. (Patient not taking: Reported on 4/27/2022), Disp: 454 g, Rfl: 0     Review of Systems:   Review of Systems   Constitutional: Negative for chills, diaphoresis, fever, malaise/fatigue and weight loss.   HENT: Negative for congestion, hearing loss, sinus pain, sore throat and tinnitus.    Eyes: Negative for blurred vision, pain, discharge and redness.   Respiratory: Positive for shortness of breath. Negative for cough, hemoptysis, sputum production and wheezing.    Cardiovascular: Positive for leg swelling. Negative for chest pain, palpitations, orthopnea and claudication.   Gastrointestinal: Negative for abdominal pain, blood in stool, constipation, diarrhea, heartburn, melena,  nausea and vomiting.   Genitourinary: Negative for dysuria, frequency and urgency.   Musculoskeletal: Negative for back pain, joint pain, myalgias and neck pain.   Skin: Negative for itching and rash.   Neurological: Negative for dizziness, tremors, speech change, focal weakness, seizures, weakness and headaches.   Endo/Heme/Allergies: Negative for environmental allergies and polydipsia. Does not bruise/bleed easily.       Vitals:   Vitals:    04/28/22 1459   BP: 116/67   BP Location: Right arm   Patient Position: Sitting   BP Method: Large (Automatic)   Pulse: 94   SpO2: 95%   Weight: (!) 148.6 kg (327 lb 9.7 oz)   Height: 6' (1.829 m)     Body mass index is 44.43 kg/m².    Physical Exam:   Physical Exam  Constitutional:       General: She is not in acute distress.     Appearance: Normal appearance. She is normal weight. She is not ill-appearing, toxic-appearing or diaphoretic.   HENT:      Nose: No congestion or rhinorrhea.   Eyes:      General: No scleral icterus.        Right eye: No discharge.         Left eye: No discharge.   Neck:      Comments: Unable to assess JVD  Cardiovascular:      Rate and Rhythm: Normal rate and regular rhythm.      Pulses: Normal pulses.      Heart sounds: Normal heart sounds. No murmur heard.    No friction rub. No gallop.   Pulmonary:      Effort: Pulmonary effort is normal. No respiratory distress.      Breath sounds: No stridor. Rales (Minimal crackles, left base) present. No wheezing or rhonchi.   Chest:      Chest wall: No tenderness.   Abdominal:      General: Abdomen is flat. Bowel sounds are normal. There is no distension.      Palpations: Abdomen is soft. There is no mass.      Tenderness: There is no abdominal tenderness. There is no guarding.   Musculoskeletal:      Right lower leg: Edema (to mid thigh) present.      Left lower leg: Edema (to mid thigh) present.   Skin:     General: Skin is warm and dry.      Coloration: Skin is not jaundiced.   Neurological:       General: No focal deficit present.      Mental Status: She is alert and oriented to person, place, and time.         Labs: Previous labs reviewed.    Imaging: Previous imaging reviewed.     Assessment/Plan:   Leg swelling  Shortness of breath  Patient clinically volume overloaded with significant weight gain since last visit. Will increase lasix dose, schedule echo, and order labs. Follow in 1 week. Will discuss pain control with Dr. Naik. At this time, recommended only taking norco as prescribed.  -     Echo; Future; Expected date: 04/28/2022  -     Comprehensive Metabolic Panel; Future; Expected date: 04/28/2022  -     BNP; Future; Expected date: 04/28/2022  -     furosemide (LASIX) 40 MG tablet; Take 1 tablet (40 mg total) by mouth 2 (two) times a day.  Dispense: 60 tablet; Refill: 0    Follow up in 1 week    Discussed with Dr. Chester

## 2022-04-28 NOTE — PROGRESS NOTES
Health Maintenance Due   Topic Date Due    Diabetes Urine Screening  Never done    Foot Exam  Never done    Sign Pain Contract  Never done    Complete Opioid Risk Tool  Never done    Low Dose Statin  Never done    Eye Exam  09/28/2018    Colorectal Cancer Screening  05/28/2019    Mammogram  09/03/2020    Shingles Vaccine (3 of 3) 11/20/2020     Updates were requested from care everywhere.  Chart was reviewed for overdue Proactive [facility] Encounters ([facility]) topics (CRS, Breast Cancer Screening, Eye exam)  Health Maintenance has been updated.  [place] immunization registry triggered.  Immunizations were reconciled.  Mammogram ordered/task ticket sent.  Cologuard Screening (Multitarget Stool DNA)Released 3/11/2022

## 2022-04-28 NOTE — PATIENT INSTRUCTIONS
Plan:  - increase lasix to 40 mg twice daily  - echo ordered  - labs ordered  - follow up in 1 week

## 2022-04-29 ENCOUNTER — OFFICE VISIT (OUTPATIENT)
Dept: RHEUMATOLOGY | Facility: CLINIC | Age: 72
End: 2022-04-29
Payer: MEDICARE

## 2022-04-29 ENCOUNTER — HOSPITAL ENCOUNTER (OUTPATIENT)
Dept: CARDIOLOGY | Facility: HOSPITAL | Age: 72
Discharge: HOME | End: 2022-04-29
Attending: STUDENT IN AN ORGANIZED HEALTH CARE EDUCATION/TRAINING PROGRAM
Payer: MEDICARE

## 2022-04-29 VITALS
BODY MASS INDEX: 44.43 KG/M2 | DIASTOLIC BLOOD PRESSURE: 59 MMHG | TEMPERATURE: 99 F | HEART RATE: 82 BPM | SYSTOLIC BLOOD PRESSURE: 118 MMHG | WEIGHT: 293 LBS

## 2022-04-29 VITALS
HEIGHT: 72 IN | HEART RATE: 95 BPM | BODY MASS INDEX: 39.68 KG/M2 | SYSTOLIC BLOOD PRESSURE: 118 MMHG | WEIGHT: 293 LBS | DIASTOLIC BLOOD PRESSURE: 59 MMHG

## 2022-04-29 DIAGNOSIS — L92.1 NECROBIOSIS LIPOIDICA: Primary | ICD-10-CM

## 2022-04-29 DIAGNOSIS — M79.89 LEG SWELLING: ICD-10-CM

## 2022-04-29 DIAGNOSIS — R06.02 SHORTNESS OF BREATH: ICD-10-CM

## 2022-04-29 DIAGNOSIS — I73.9 PAD (PERIPHERAL ARTERY DISEASE): ICD-10-CM

## 2022-04-29 LAB
ALBUMIN SERPL BCP-MCNC: 3.5 G/DL (ref 3.5–5.2)
ALP SERPL-CCNC: 52 U/L (ref 55–135)
ALT SERPL W/O P-5'-P-CCNC: 10 U/L (ref 10–44)
ANION GAP SERPL CALC-SCNC: 10 MMOL/L (ref 8–16)
ASCENDING AORTA: 3.41 CM
AST SERPL-CCNC: 16 U/L (ref 10–40)
AV INDEX (PROSTH): 0.61
AV MEAN GRADIENT: 12 MMHG
AV PEAK GRADIENT: 23 MMHG
AV VALVE AREA: 2.19 CM2
AV VELOCITY RATIO: 0.63
BILIRUB SERPL-MCNC: 0.3 MG/DL (ref 0.1–1)
BNP SERPL-MCNC: 30 PG/ML (ref 0–99)
BSA FOR ECHO PROCEDURE: 2.74 M2
BUN SERPL-MCNC: 14 MG/DL (ref 8–23)
CALCIUM SERPL-MCNC: 9.5 MG/DL (ref 8.7–10.5)
CHLORIDE SERPL-SCNC: 105 MMOL/L (ref 95–110)
CO2 SERPL-SCNC: 26 MMOL/L (ref 23–29)
CREAT SERPL-MCNC: 0.8 MG/DL (ref 0.5–1.4)
CV ECHO LV RWT: 0.32 CM
DOP CALC AO PEAK VEL: 2.39 M/S
DOP CALC AO VTI: 46.05 CM
DOP CALC LVOT AREA: 3.6 CM2
DOP CALC LVOT DIAMETER: 2.14 CM
DOP CALC LVOT PEAK VEL: 1.5 M/S
DOP CALC LVOT STROKE VOLUME: 100.62 CM3
DOP CALCLVOT PEAK VEL VTI: 27.99 CM
E WAVE DECELERATION TIME: 182.51 MSEC
E/A RATIO: 0.97
E/E' RATIO: 7.92 M/S
ECHO LV POSTERIOR WALL: 0.87 CM (ref 0.6–1.1)
EJECTION FRACTION: 65 %
EST. GFR  (AFRICAN AMERICAN): >60 ML/MIN/1.73 M^2
EST. GFR  (NON AFRICAN AMERICAN): >60 ML/MIN/1.73 M^2
FRACTIONAL SHORTENING: 32 % (ref 28–44)
GLUCOSE SERPL-MCNC: 93 MG/DL (ref 70–110)
INTERVENTRICULAR SEPTUM: 0.81 CM (ref 0.6–1.1)
IVRT: 71.36 MSEC
LA MAJOR: 6.11 CM
LA MINOR: 6.02 CM
LA WIDTH: 4.71 CM
LEFT ATRIUM SIZE: 3.67 CM
LEFT ATRIUM VOLUME INDEX MOD: 33.1 ML/M2
LEFT ATRIUM VOLUME INDEX: 33.9 ML/M2
LEFT ATRIUM VOLUME MOD: 87.11 CM3
LEFT ATRIUM VOLUME: 89.11 CM3
LEFT INTERNAL DIMENSION IN SYSTOLE: 3.72 CM (ref 2.1–4)
LEFT VENTRICLE DIASTOLIC VOLUME INDEX: 55.11 ML/M2
LEFT VENTRICLE DIASTOLIC VOLUME: 144.94 ML
LEFT VENTRICLE MASS INDEX: 64 G/M2
LEFT VENTRICLE SYSTOLIC VOLUME INDEX: 22.4 ML/M2
LEFT VENTRICLE SYSTOLIC VOLUME: 58.91 ML
LEFT VENTRICULAR INTERNAL DIMENSION IN DIASTOLE: 5.46 CM (ref 3.5–6)
LEFT VENTRICULAR MASS: 168.02 G
LV LATERAL E/E' RATIO: 7.62 M/S
LV SEPTAL E/E' RATIO: 8.25 M/S
MV A" WAVE DURATION": 8.56 MSEC
MV PEAK A VEL: 1.02 M/S
MV PEAK E VEL: 0.99 M/S
MV STENOSIS PRESSURE HALF TIME: 52.93 MS
MV VALVE AREA P 1/2 METHOD: 4.16 CM2
PISA TR MAX VEL: 2.98 M/S
POTASSIUM SERPL-SCNC: 4.4 MMOL/L (ref 3.5–5.1)
PROT SERPL-MCNC: 7.1 G/DL (ref 6–8.4)
PULM VEIN S/D RATIO: 1.69
PV PEAK D VEL: 0.49 M/S
PV PEAK S VEL: 0.83 M/S
RA MAJOR: 5.55 CM
RA PRESSURE: 3 MMHG
RA WIDTH: 4.02 CM
RIGHT VENTRICULAR END-DIASTOLIC DIMENSION: 4.08 CM
RV TISSUE DOPPLER FREE WALL SYSTOLIC VELOCITY 1 (APICAL 4 CHAMBER VIEW): 18.64 CM/S
SINUS: 2.88 CM
SODIUM SERPL-SCNC: 141 MMOL/L (ref 136–145)
STJ: 2.58 CM
TDI LATERAL: 0.13 M/S
TDI SEPTAL: 0.12 M/S
TDI: 0.13 M/S
TR MAX PG: 36 MMHG
TRICUSPID ANNULAR PLANE SYSTOLIC EXCURSION: 2.53 CM
TV REST PULMONARY ARTERY PRESSURE: 39 MMHG

## 2022-04-29 PROCEDURE — 99213 OFFICE O/P EST LOW 20 MIN: CPT | Mod: S$GLB,,, | Performed by: INTERNAL MEDICINE

## 2022-04-29 PROCEDURE — 93306 TTE W/DOPPLER COMPLETE: CPT | Mod: 26,,, | Performed by: INTERNAL MEDICINE

## 2022-04-29 PROCEDURE — 1125F PR PAIN SEVERITY QUANTIFIED, PAIN PRESENT: ICD-10-PCS | Mod: CPTII,S$GLB,, | Performed by: INTERNAL MEDICINE

## 2022-04-29 PROCEDURE — 4010F ACE/ARB THERAPY RXD/TAKEN: CPT | Mod: CPTII,S$GLB,, | Performed by: INTERNAL MEDICINE

## 2022-04-29 PROCEDURE — 3008F BODY MASS INDEX DOCD: CPT | Mod: CPTII,S$GLB,, | Performed by: INTERNAL MEDICINE

## 2022-04-29 PROCEDURE — 3074F SYST BP LT 130 MM HG: CPT | Mod: CPTII,S$GLB,, | Performed by: INTERNAL MEDICINE

## 2022-04-29 PROCEDURE — 4010F PR ACE/ARB THEARPY RXD/TAKEN: ICD-10-PCS | Mod: CPTII,S$GLB,, | Performed by: INTERNAL MEDICINE

## 2022-04-29 PROCEDURE — 93306 TTE W/DOPPLER COMPLETE: CPT

## 2022-04-29 PROCEDURE — 3074F PR MOST RECENT SYSTOLIC BLOOD PRESSURE < 130 MM HG: ICD-10-PCS | Mod: CPTII,S$GLB,, | Performed by: INTERNAL MEDICINE

## 2022-04-29 PROCEDURE — 1160F RVW MEDS BY RX/DR IN RCRD: CPT | Mod: CPTII,S$GLB,, | Performed by: INTERNAL MEDICINE

## 2022-04-29 PROCEDURE — 1125F AMNT PAIN NOTED PAIN PRSNT: CPT | Mod: CPTII,S$GLB,, | Performed by: INTERNAL MEDICINE

## 2022-04-29 PROCEDURE — 3008F PR BODY MASS INDEX (BMI) DOCUMENTED: ICD-10-PCS | Mod: CPTII,S$GLB,, | Performed by: INTERNAL MEDICINE

## 2022-04-29 PROCEDURE — 3078F DIAST BP <80 MM HG: CPT | Mod: CPTII,S$GLB,, | Performed by: INTERNAL MEDICINE

## 2022-04-29 PROCEDURE — 1159F PR MEDICATION LIST DOCUMENTED IN MEDICAL RECORD: ICD-10-PCS | Mod: CPTII,S$GLB,, | Performed by: INTERNAL MEDICINE

## 2022-04-29 PROCEDURE — 1160F PR REVIEW ALL MEDS BY PRESCRIBER/CLIN PHARMACIST DOCUMENTED: ICD-10-PCS | Mod: CPTII,S$GLB,, | Performed by: INTERNAL MEDICINE

## 2022-04-29 PROCEDURE — 1159F MED LIST DOCD IN RCRD: CPT | Mod: CPTII,S$GLB,, | Performed by: INTERNAL MEDICINE

## 2022-04-29 PROCEDURE — 99499 UNLISTED E&M SERVICE: CPT | Mod: S$GLB,,, | Performed by: INTERNAL MEDICINE

## 2022-04-29 PROCEDURE — 99999 PR PBB SHADOW E&M-EST. PATIENT-LVL III: ICD-10-PCS | Mod: PBBFAC,,, | Performed by: INTERNAL MEDICINE

## 2022-04-29 PROCEDURE — 99499 RISK ADDL DX/OHS AUDIT: ICD-10-PCS | Mod: S$GLB,,, | Performed by: INTERNAL MEDICINE

## 2022-04-29 PROCEDURE — 99999 PR PBB SHADOW E&M-EST. PATIENT-LVL III: CPT | Mod: PBBFAC,,, | Performed by: INTERNAL MEDICINE

## 2022-04-29 PROCEDURE — 93306 ECHO (CUPID ONLY): ICD-10-PCS | Mod: 26,,, | Performed by: INTERNAL MEDICINE

## 2022-04-29 PROCEDURE — 3078F PR MOST RECENT DIASTOLIC BLOOD PRESSURE < 80 MM HG: ICD-10-PCS | Mod: CPTII,S$GLB,, | Performed by: INTERNAL MEDICINE

## 2022-04-29 PROCEDURE — 99213 PR OFFICE/OUTPT VISIT, EST, LEVL III, 20-29 MIN: ICD-10-PCS | Mod: S$GLB,,, | Performed by: INTERNAL MEDICINE

## 2022-05-01 NOTE — PROGRESS NOTES
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  She remembers it in the lower extremities.  She had trouble walking.  It may have lasted 6 months.  She was treated with unknown medication.  She has had no recurrence of the problem.     She has had a rash on her leg since she was 18 years old.  She had a previous biopsy which revealed necrobiosis lipoidica.  She has also been clinically the diagnosed as having lipodermatosclerosis.  She developed ulcers on her right leg in December.  She has been going to Wound Care.  She had had at least 1 or 2 prior episodes of ulcers on her legs.  She had been treated with steroids in the past for the ulcers.  She had only been on topical medications for the rash.  She has been going to wound care.  She was seen by vascular surgery.  She was diagnosed as having pyoderma gangrenosum.  She was placed on high-dose prednisone.     I saw her for the 1st time in [facility].  Clinically she had no evidence to suggest an underlying connective tissue disease.  She does have osteoarthritis.  I saw her again in March.  She was off prednisone.  Her legs were less erythematous.  I placed her on Plaquenil 400 mg daily.    Her legs are doing about the same.  She has no active ulcers on the left leg.  She has developed some increased swelling in her legs and is on increased dose of the diuretic.  She is scheduled for an echocardiogram.  She denies other recent medical problems.    Physical examination was not performed, the entire time was counseling.    Assessment:  1.NLD  2. Osteoarthritis  3. Peripheral vascular disease    Plans:  1. I recommend an eye exam  2. Continue Plaquenil as before  3. Return in 4 months

## 2022-05-02 ENCOUNTER — CLINICAL SUPPORT (OUTPATIENT)
Dept: WOUND CARE | Facility: CLINIC | Age: 72
End: 2022-05-02
Payer: MEDICARE

## 2022-05-02 VITALS
SYSTOLIC BLOOD PRESSURE: 142 MMHG | BODY MASS INDEX: 39.68 KG/M2 | HEART RATE: 75 BPM | WEIGHT: 293 LBS | HEIGHT: 72 IN | TEMPERATURE: 99 F | DIASTOLIC BLOOD PRESSURE: 64 MMHG

## 2022-05-02 DIAGNOSIS — I73.9 PVD (PERIPHERAL VASCULAR DISEASE): ICD-10-CM

## 2022-05-02 DIAGNOSIS — L98.491 SKIN ULCER, LIMITED TO BREAKDOWN OF SKIN: Primary | ICD-10-CM

## 2022-05-02 PROCEDURE — 99999 PR PBB SHADOW E&M-EST. PATIENT-LVL IV: CPT | Mod: PBBFAC,,,

## 2022-05-02 PROCEDURE — 99999 PR PBB SHADOW E&M-EST. PATIENT-LVL IV: ICD-10-PCS | Mod: PBBFAC,,,

## 2022-05-02 NOTE — PATIENT INSTRUCTIONS
Compression dressings to BLE as detailed above  Patient instructed to elevate legs when sitting for prolonged periods of time  Patient was warn not to get dressing wet and to use a cast cover for showering.  Should dressing become wet, patient is to remove by unrolling each layer from the top going down; shower with a mild soap, pat dry and place a wet-to-dry dressing over the wound cover with gauze, roll gauze and secure with paper tape.  Apply two ace wraps for compression and to secure bandages.  Patient to notify this office immediately to have a new dressing applied.  Wound care instructions given to patient and   Return to clinic Monday

## 2022-05-02 NOTE — PROGRESS NOTES
Subjective:       Patient ID: Ana Patel is a 72 y.o. female.    Chief Complaint: Wound Check (Skin ulcer, limited to breakdown of skin)    HPI  Review of Systems    Objective:      Physical Exam    Assessment:       1. Skin ulcer, limited to breakdown of skin    2. PVD (peripheral vascular disease)           Wound Right anterior;distal;lower Leg (Active)        Pre-existing:    Primary Wound Type:    Side: Right   Orientation: anterior;distal;lower   Location: Leg   Wound Number:    Ankle-Brachial Index:    Pulses:    Removal Indication and Assessment:    Wound Outcome:    (Retired) Wound Type:    (Retired) Wound Length (cm):    (Retired) Wound Width (cm):    (Retired) Depth (cm):    Wound Description (Comments):    Removal Indications:    Wound Image   05/02/22 1749   Dressing Appearance Saturated;Intact 05/02/22 1749   Drainage Amount Large 05/02/22 1749   Drainage Characteristics/Odor Clear 05/02/22 1749   Yellow (%), Wound Tissue Color 100 % 05/02/22 1749   Periwound Area Moist;Pink;Redness;Edematous 05/02/22 1749   Wound Length (cm) 3 cm 05/02/22 1749   Wound Width (cm) 3 cm 05/02/22 1749   Wound Surface Area (cm^2) 9 cm^2 05/02/22 1749   Care Cleansed with:;Soap and water 05/02/22 1749   Dressing Applied;Compression wrap 05/02/22 1749   Compression Three layer compression 05/02/22 1749   Dressing Change Due 05/09/22 05/02/22 1749            Wound Right anterior;lower;midline Leg (Active)        Pre-existing:    Primary Wound Type:    Side: Right   Orientation: anterior;lower;midline   Location: Leg   Wound Number:    Ankle-Brachial Index:    Pulses:    Removal Indication and Assessment:    Wound Outcome:    (Retired) Wound Type:    (Retired) Wound Length (cm):    (Retired) Wound Width (cm):    (Retired) Depth (cm):    Wound Description (Comments):    Removal Indications:    Wound Image   05/02/22 1749   Dressing Appearance Intact;Moist drainage 05/02/22 1749   Drainage Amount Large 05/02/22 1749    Drainage Characteristics/Odor Clear 05/02/22 1749   Red (%), Wound Tissue Color 95 % 05/02/22 1749   Yellow (%), Wound Tissue Color 5 % 05/02/22 1749   Periwound Area Intact;Edematous;Pink 05/02/22 1749   Wound Length (cm) 3.9 cm 05/02/22 1749   Wound Width (cm) 4.8 cm 05/02/22 1749   Wound Surface Area (cm^2) 18.72 cm^2 05/02/22 1749   Care Cleansed with:;Soap and water 05/02/22 1749   Dressing Applied;Compression wrap 05/02/22 1749   Compression Three layer compression 05/02/22 1749   Dressing Change Due 05/09/22 05/02/22 1749            Wound Right anterior;lower;proximal;lateral Leg (Active)        Pre-existing:    Primary Wound Type:    Side: Right   Orientation: anterior;lower;proximal;lateral   Location: Leg   Wound Number:    Ankle-Brachial Index:    Pulses:    Removal Indication and Assessment:    Wound Outcome:    (Retired) Wound Type:    (Retired) Wound Length (cm):    (Retired) Wound Width (cm):    (Retired) Depth (cm):    Wound Description (Comments):    Removal Indications:    Wound Image   05/02/22 1749   Dressing Appearance Moist drainage;Intact 05/02/22 1749   Drainage Amount Large 05/02/22 1749   Drainage Characteristics/Odor Clear 05/02/22 1749   Yellow (%), Wound Tissue Color 100 % 05/02/22 1749   Periwound Area Intact;Edematous;Pink 05/02/22 1749   Wound Length (cm) 1.7 cm 05/02/22 1749   Wound Width (cm) 1.3 cm 05/02/22 1749   Wound Surface Area (cm^2) 2.21 cm^2 05/02/22 1749   Care Cleansed with:;Soap and water 05/02/22 1749   Dressing Applied;Compression wrap 05/02/22 1749   Compression Three layer compression 05/02/22 1749   Dressing Change Due 05/09/22 05/02/22 1749            Wound Left lower Leg (Active)        Pre-existing:    Primary Wound Type:    Side: Left   Orientation: lower   Location: Leg   Wound Number:    Ankle-Brachial Index:    Pulses:    Removal Indication and Assessment:    Wound Outcome:    (Retired) Wound Type:    (Retired) Wound Length (cm):    (Retired) Wound Width  (cm):    (Retired) Depth (cm):    Wound Description (Comments):    Removal Indications:    Wound Image   05/02/22 1749   Dressing Appearance Dry;Intact 05/02/22 1749   Drainage Amount None 05/02/22 1749   Care Cleansed with:;Soap and water 05/02/22 1749   Ana was seen in wound care clinic today accompanied by her , placed in sitting position with legs elevated in treatment chair.  Bilateral compression dressings removed and legs cleaned with Easi-clense sponge and dried thoroughly.  Bilateral edema noted to both lower extremities and right foot/toes with 2+ edema.  Left lower extremity redness with noted improvement. Dr. Garrido evaluated and treated bilateral lower extremities - 4% Lidocaine Hydrochloride topical solution was applied to right lower extremity for 10 minutes.  Dr. Garrido perform sharp wound debridement and patient tolerated procedure well.  New wound care orders given per Dr. Garrido: Calmoseptine barrier cream applied to anju-wound skin of right lower extremity wounds,  Calamine unna boot three layer application with calamine layer, left lower is healed per Dr. Garrido Tuubigrip F was placed on the leg for compression until she's able to get to the store, they need to be applied first thing in morning.  Patient's feet was positioned at a 90 degree angle. A two layer calamine coflex wrap was applied to the right lower extremity, the calamine foam layer was applied first followed by the cohesive coban outer layer using a spiral technique avoiding creases or folds.  Each layer was started behind the first metatarsal and ended below the tibial tubercle of the knee. There was overlap of each turn half the width of the previous turn. dressing change in seven days        Plan:    Compression dressings to BLE as detailed above  Patient instructed to elevate legs when sitting for prolonged periods of time  Patient was warn not to get dressing wet and to use a cast cover for showering.  Should dressing  become wet, patient is to remove by unrolling each layer from the top going down; shower with a mild soap, pat dry and place a wet-to-dry dressing over the wound cover with gauze, roll gauze and secure with paper tape.  Apply two ace wraps for compression and to secure bandages.  Patient to notify this office immediately to have a new dressing applied.  Wound care instructions given to patient and   Return to clinic Monday

## 2022-05-03 NOTE — PROGRESS NOTES
I have personally discussed  this patient and agree with the resident, and agree with  their note as stated above with the following thoughts:    Labs nad get echo-- diurese

## 2022-05-04 ENCOUNTER — TELEPHONE (OUTPATIENT)
Dept: INTERNAL MEDICINE | Facility: CLINIC | Age: 72
End: 2022-05-04
Payer: MEDICARE

## 2022-05-04 ENCOUNTER — HOSPITAL ENCOUNTER (EMERGENCY)
Facility: HOSPITAL | Age: 72
Discharge: HOME | End: 2022-05-04
Attending: EMERGENCY MEDICINE
Payer: MEDICARE

## 2022-05-04 VITALS
WEIGHT: 293 LBS | HEIGHT: 72 IN | TEMPERATURE: 98 F | BODY MASS INDEX: 39.68 KG/M2 | OXYGEN SATURATION: 95 % | RESPIRATION RATE: 18 BRPM | DIASTOLIC BLOOD PRESSURE: 72 MMHG | HEART RATE: 100 BPM | SYSTOLIC BLOOD PRESSURE: 130 MMHG

## 2022-05-04 DIAGNOSIS — R22.43 LOCALIZED SWELLING OF BOTH LOWER LEGS: Primary | ICD-10-CM

## 2022-05-04 DIAGNOSIS — R06.02 SHORTNESS OF BREATH: ICD-10-CM

## 2022-05-04 DIAGNOSIS — L03.116 CELLULITIS OF LEFT LEG: ICD-10-CM

## 2022-05-04 LAB
ALBUMIN SERPL BCP-MCNC: 3.4 G/DL (ref 3.5–5.2)
ALP SERPL-CCNC: 48 U/L (ref 55–135)
ALT SERPL W/O P-5'-P-CCNC: 8 U/L (ref 10–44)
ANION GAP SERPL CALC-SCNC: 12 MMOL/L (ref 8–16)
AST SERPL-CCNC: 13 U/L (ref 10–40)
BASOPHILS # BLD AUTO: 0.05 K/UL (ref 0–0.2)
BASOPHILS NFR BLD: 0.7 % (ref 0–1.9)
BILIRUB SERPL-MCNC: 0.3 MG/DL (ref 0.1–1)
BNP SERPL-MCNC: 29 PG/ML (ref 0–99)
BUN SERPL-MCNC: 22 MG/DL (ref 8–23)
CALCIUM SERPL-MCNC: 9.5 MG/DL (ref 8.7–10.5)
CHLORIDE SERPL-SCNC: 103 MMOL/L (ref 95–110)
CO2 SERPL-SCNC: 25 MMOL/L (ref 23–29)
CREAT SERPL-MCNC: 1.1 MG/DL (ref 0.5–1.4)
DIFFERENTIAL METHOD: ABNORMAL
EOSINOPHIL # BLD AUTO: 0.2 K/UL (ref 0–0.5)
EOSINOPHIL NFR BLD: 2.2 % (ref 0–8)
ERYTHROCYTE [DISTWIDTH] IN BLOOD BY AUTOMATED COUNT: 15.9 % (ref 11.5–14.5)
EST. GFR  (AFRICAN AMERICAN): 58.8 ML/MIN/1.73 M^2
EST. GFR  (NON AFRICAN AMERICAN): 51 ML/MIN/1.73 M^2
GLUCOSE SERPL-MCNC: 88 MG/DL (ref 70–110)
HCT VFR BLD AUTO: 31.2 % (ref 37–48.5)
HGB BLD-MCNC: 9.6 G/DL (ref 12–16)
IMM GRANULOCYTES # BLD AUTO: 0.06 K/UL (ref 0–0.04)
IMM GRANULOCYTES NFR BLD AUTO: 0.8 % (ref 0–0.5)
LYMPHOCYTES # BLD AUTO: 1.5 K/UL (ref 1–4.8)
LYMPHOCYTES NFR BLD: 21.2 % (ref 18–48)
MCH RBC QN AUTO: 31.2 PG (ref 27–31)
MCHC RBC AUTO-ENTMCNC: 30.8 G/DL (ref 32–36)
MCV RBC AUTO: 101 FL (ref 82–98)
MONOCYTES # BLD AUTO: 1.5 K/UL (ref 0.3–1)
MONOCYTES NFR BLD: 20.1 % (ref 4–15)
NEUTROPHILS # BLD AUTO: 4 K/UL (ref 1.8–7.7)
NEUTROPHILS NFR BLD: 55 % (ref 38–73)
NRBC BLD-RTO: 0 /100 WBC
PLATELET # BLD AUTO: 177 K/UL (ref 150–450)
PMV BLD AUTO: 11.3 FL (ref 9.2–12.9)
POTASSIUM SERPL-SCNC: 4 MMOL/L (ref 3.5–5.1)
PROT SERPL-MCNC: 7.3 G/DL (ref 6–8.4)
RBC # BLD AUTO: 3.08 M/UL (ref 4–5.4)
SODIUM SERPL-SCNC: 140 MMOL/L (ref 136–145)
TROPONIN I SERPL DL<=0.01 NG/ML-MCNC: 0.01 NG/ML (ref 0–0.03)
WBC # BLD AUTO: 7.28 K/UL (ref 3.9–12.7)

## 2022-05-04 PROCEDURE — 93010 ELECTROCARDIOGRAM REPORT: CPT | Mod: ,,, | Performed by: INTERNAL MEDICINE

## 2022-05-04 PROCEDURE — 80053 COMPREHEN METABOLIC PANEL: CPT | Performed by: EMERGENCY MEDICINE

## 2022-05-04 PROCEDURE — 85025 COMPLETE CBC W/AUTO DIFF WBC: CPT | Performed by: EMERGENCY MEDICINE

## 2022-05-04 PROCEDURE — 99285 PR EMERGENCY DEPT VISIT,LEVEL V: ICD-10-PCS | Mod: ,,, | Performed by: EMERGENCY MEDICINE

## 2022-05-04 PROCEDURE — 86803 HEPATITIS C AB TEST: CPT | Performed by: EMERGENCY MEDICINE

## 2022-05-04 PROCEDURE — 93010 EKG 12-LEAD: ICD-10-PCS | Mod: ,,, | Performed by: INTERNAL MEDICINE

## 2022-05-04 PROCEDURE — 84484 ASSAY OF TROPONIN QUANT: CPT | Performed by: EMERGENCY MEDICINE

## 2022-05-04 PROCEDURE — 99285 EMERGENCY DEPT VISIT HI MDM: CPT | Mod: ,,, | Performed by: EMERGENCY MEDICINE

## 2022-05-04 PROCEDURE — 87389 HIV-1 AG W/HIV-1&-2 AB AG IA: CPT | Performed by: EMERGENCY MEDICINE

## 2022-05-04 PROCEDURE — 93005 ELECTROCARDIOGRAM TRACING: CPT

## 2022-05-04 PROCEDURE — 99285 EMERGENCY DEPT VISIT HI MDM: CPT | Mod: 25

## 2022-05-04 PROCEDURE — 83880 ASSAY OF NATRIURETIC PEPTIDE: CPT | Performed by: EMERGENCY MEDICINE

## 2022-05-04 RX ORDER — DOXYCYCLINE 100 MG/1
100 CAPSULE ORAL 2 TIMES DAILY
Qty: 14 CAPSULE | Refills: 0 | Status: SHIPPED | OUTPATIENT
Start: 2022-05-04 | End: 2022-05-11

## 2022-05-04 NOTE — TELEPHONE ENCOUNTER
Spoke with pt spouse and he described that pt's legs are very swollen and the leg with the wound is weeping. Headed to the ED now. Had a follow up appt tomorrow in resident clinic Dr Mccabe.  Last office visit for leg swelling 04/28 in resident clinic

## 2022-05-04 NOTE — TELEPHONE ENCOUNTER
----- Message from Royal Prabhakar sent at 5/4/2022  8:21 AM CDT -----  Contact: pt  Caller is requesting an earlier appointment then we can schedule.  Caller is requesting a message be sent to the provider.  If this is for urgent care symptoms, did you offer other providers at this location, providers at other locations, or [facility]? (yes, no, n/a):    If this is for the patients physical, did you offer to schedule next available and put on wait list, or to see NP or PA for their physical?  (yes, no, n/a):    When is the next available appointment with their provider: unknown   Reason for the appointment:  swelling and felling bad  Patient preference of timeframe to be scheduled: today   Would the patient like a call back, or a response through their [facility] portal?: call  Comments:

## 2022-05-04 NOTE — ED PROVIDER NOTES
"Encounter Date: 5/4/2022       History     Chief Complaint   Patient presents with    Edema     Arrived via ems from home with c/o BLE swelling for few months, worse today, weeping, "open sores" to right leg     Ana Patel is a 72 y.o. female who  has a past medical history of Eczema, Hyperlipidemia, Hypertension, Necrobiosis lipoidica, Obesity, Osteoarthritis, and Varicose vein of leg.    The patient presents to the ED due to bilateral leg swelling.   Patient reports symptoms started a few months ago.   She is followed by Rheumatology, wound care, vascular surgery, and her PCP.  She was recently evaluated by her PCP and had labs and an echo ordered.  Her Lasix was increased, and she was scheduled for a follow-up appointment.  She presents today because the swelling is getting worse and it is not controlled with her home pain medication.  She denies any associated fever, chest pain, shortness of breath, abdominal pain, nausea/vomiting/diarrhea. She is not currently on ABX.         Review of patient's allergies indicates:   Allergen Reactions    Sulfa (sulfonamide antibiotics) Hives    Oxycodone Other (See Comments)     Restless, poor sleep    Pravastatin Other (See Comments)     Muscle cramps    Ciprofloxacin Rash    Pcn [penicillins] Rash     Past Medical History:   Diagnosis Date    Eczema     Hyperlipidemia     Hypertension     Necrobiosis lipoidica     Obesity     Osteoarthritis     Varicose vein of leg      Past Surgical History:   Procedure Laterality Date    ANGIOGRAPHY OF LOWER EXTREMITY Right 3/8/2022    Procedure: Angiogram Extremity Unilateral;  Surgeon: Eliud Garrido MD;  Location: [facility] OR [address] [place] [address];  Service: Vascular;  Laterality: Right;  48mL Contrast dye  417.58 mGy  138.57 Gycm2  18.7 minutes fluoroscopy time.    CYST REMOVAL Right     PERCUTANEOUS TRANSLUMINAL ANGIOPLASTY Right 3/8/2022    Procedure: PTA (ANGIOPLASTY, PERCUTANEOUS, TRANSLUMINAL);  Surgeon: Eliud Garrido, " MD;  Location: [facility] OR [address] [place] [address];  Service: Vascular;  Laterality: Right;  SFA    TONSILLECTOMY, ADENOIDECTOMY       Family History   Adopted: Yes   Problem Relation Age of Onset    No Known Problems Daughter     No Known Problems Son      Social History     Tobacco Use    Smoking status: Current Every Day Smoker     Packs/day: 0.50     Years: 30.00     Pack years: 15.00     Types: Cigarettes    Smokeless tobacco: Never Used   Substance Use Topics    Alcohol use: Not Currently     Types: 1 Standard drinks or equivalent per week     Comment: maybe 1-2 yearly    Drug use: No     Review of Systems   Constitutional: Negative for chills and fever.   HENT: Negative for sore throat.    Respiratory: Negative for cough and shortness of breath.    Cardiovascular: Positive for leg swelling. Negative for chest pain.   Gastrointestinal: Negative for nausea and vomiting.   Genitourinary: Negative for dysuria, frequency and urgency.   Musculoskeletal: Negative for back pain.   Skin: Negative for rash and wound.   Neurological: Negative for syncope and weakness.   Hematological: Does not bruise/bleed easily.   Psychiatric/Behavioral: Negative for agitation, behavioral problems and confusion.       Physical Exam     Initial Vitals [05/04/22 0945]   BP Pulse Resp Temp SpO2   130/72 100 18 98.1 °F (36.7 °C) 95 %      MAP       --         Physical Exam    Nursing note and vitals reviewed.  Constitutional: She appears well-developed and well-nourished. She is not diaphoretic. No distress.   HENT:   Head: Normocephalic and atraumatic.   Mouth/Throat: Oropharynx is clear and moist.   Eyes: EOM are normal. Pupils are equal, round, and reactive to light.   Neck: No tracheal deviation present.   Cardiovascular: Normal rate, regular rhythm, normal heart sounds and intact distal pulses.   Pulmonary/Chest: Breath sounds normal. No stridor. No respiratory distress. She has no wheezes.   Abdominal: Abdomen is soft. Bowel sounds are normal.  She exhibits no distension and no mass. There is no abdominal tenderness.   Musculoskeletal:         General: No edema. Normal range of motion.     Neurological: She is alert and oriented to person, place, and time. She has normal strength. No cranial nerve deficit or sensory deficit.   Skin: Skin is warm and dry. Capillary refill takes less than 2 seconds. No pallor.   Psychiatric: She has a normal mood and affect. Her behavior is normal. Thought content normal.         ED Course   Procedures  Labs Reviewed   CBC W/ AUTO DIFFERENTIAL - Abnormal; Notable for the following components:       Result Value    RBC 3.08 (*)     Hemoglobin 9.6 (*)     Hematocrit 31.2 (*)      (*)     MCH 31.2 (*)     MCHC 30.8 (*)     RDW 15.9 (*)     Immature Granulocytes 0.8 (*)     Immature Grans (Abs) 0.06 (*)     Mono # 1.5 (*)     Mono % 20.1 (*)     All other components within normal limits   COMPREHENSIVE METABOLIC PANEL - Abnormal; Notable for the following components:    Albumin 3.4 (*)     Alkaline Phosphatase 48 (*)     ALT 8 (*)     eGFR if  58.8 (*)     eGFR if non  51.0 (*)     All other components within normal limits   TROPONIN I   B-TYPE NATRIURETIC PEPTIDE   HIV 1 / 2 ANTIBODY   HEPATITIS C ANTIBODY     EKG Readings: (Independently Interpreted)   Initial Reading: No STEMI.   Normal sinus rhythm with sinus arrhythmia, rate 73, no ST changes or evidence of ischemia, normal intervals.  No prior for comparison.     ECG Results          EKG 12-lead (In process)  Result time 05/04/22 13:26:14    In process by Interface, Lab In [facility] (05/04/22 13:26:14)                 Narrative:    Test Reason : R06.02,    Vent. Rate : 071 BPM     Atrial Rate : 074 BPM     P-R Int : 190 ms          QRS Dur : 098 ms      QT Int : 382 ms       P-R-T Axes : 038 043 013 degrees     QTc Int : 415 ms    Undetermined rhythm  Cannot rule out Inferior infarct ,age undetermined  Cannot rule out Anterior infarct  ,age undetermined  Abnormal ECG  No previous ECGs available    Referred By: [email or web address]   SELF           Confirmed By:                             Imaging Results          X-Ray Chest AP Portable (Final result)  Result time 05/04/22 12:27:20    Final result by Ibrahima Valenzuela III, MD (05/04/22 12:27:20)                 Impression:      No acute process seen.      Electronically signed by: Ibrahima Valenzuela MD  Date:    05/04/2022  Time:    12:27             Narrative:    EXAMINATION:  XR CHEST AP PORTABLE    CLINICAL HISTORY:  CHF;    FINDINGS:  Chest one view portable.    Heart size is normal.  Lungs are clear and the bones show nothing unusual.                                 Medications - No data to display  Medical Decision Making:   History:   Old Medical Records: I decided to obtain old medical records.  Old Records Summarized: records from clinic visits, other records and records from another hospital.       <> Summary of Records: Patient is followed by Rheumatology, vascular surgery, wound care, and PCP.  Ultrasound on 03/28 revealed no evidence of DVT.  Seen by PCP 4/28.  Lasix increased to 40 mg b.i.d. Echo was ordered, revealing EF of 65% with normal systolic and diastolic function.  Patient is scheduled for follow-up appointment tomorrow, 5/5.  Initial Assessment:   71 yo F with leg swelling.   Will obtain labs, CXR, and reassess.  Differential Diagnosis:   Differential Diagnosis includes, but is not limited to:  PE, MI/ACS, pneumothorax, pericardial effusion/tamonade, pneumonia, lung abscess, pericarditis/myocarditis, pleural effusion, lung mass, CHF exacerbation, asthma exacerbation, COPD exacerbation, aspirated/ingested foreign body, airway obstruction, CO poisoning, anemia, metabolic derangement, allergy/atopy, influenza, viral URI, viral syndrome.    Clinical Tests:   Lab Tests: Ordered and Reviewed  Radiological Study: Reviewed and Ordered  Medical Tests: Ordered and Reviewed  ED Management:  CXR  without infiltrates, pulmonary edema, or other acute process.  EKG without ischemia or arrhythmia.  Labs unremarkable, troponin and BNP within normal limits.  No evidence of central cause of peripheral edema.  Will cover with doxycycline for possible early cellulitis.  Patient to follow-up with PCP as scheduled tomorrow.  Return precautions discussed.    On re-evaluation, the patient's status has improved.  After complete ED evaluation, clinical impression is most consistent with bilateral leg swelling.  PCP follow-up within 2-3 days was recommended.    After taking into careful account the patient's history, physical exam findings, as well as empirical and objective data obtained throughout ED workup, I feel no emergent medical condition has been identified. No further evaluation or admission was felt to be required, and the patient is stable for discharge from the ED. The patient and any additional family present were updated with test results, overall clinical impression, and recommended further plan of care, including discharge instructions as provided and outpatient follow-up for continued evaluation and management as needed. All questions were answered. The patient expressed understanding and agreed with current plan for discharge and follow-up plan of care. Strict ED return precautions were provided, including return/worsening of current symptoms, new symptoms, or any other concerns.                        Clinical Impression:   Final diagnoses:  [R06.02] Shortness of breath  [R22.43] Localized swelling of both lower legs (Primary)  [L03.116] Cellulitis of left leg            ED Disposition Condition    Discharge Stable        ED Prescriptions     Medication Sig Dispense Start Date End Date Auth. Provider    doxycycline (VIBRAMYCIN) 100 MG Cap Take 1 capsule (100 mg total) by mouth 2 (two) times daily. for 7 days 14 capsule 5/4/2022 5/11/2022 Edvin Espino MD        Follow-up Information     Follow up With  Specialties Details Why Contact Info    Gordo Naik MD Internal Medicine Schedule an appointment as soon as possible for a visit   [address]  [place] [address]  [phone]             Edvin Espino MD  05/04/22 6386

## 2022-05-05 ENCOUNTER — TELEPHONE (OUTPATIENT)
Dept: INTERNAL MEDICINE | Facility: CLINIC | Age: 72
End: 2022-05-05

## 2022-05-05 ENCOUNTER — CLINICAL SUPPORT (OUTPATIENT)
Dept: WOUND CARE | Facility: CLINIC | Age: 72
End: 2022-05-05
Payer: MEDICARE

## 2022-05-05 ENCOUNTER — PATIENT MESSAGE (OUTPATIENT)
Dept: INTERNAL MEDICINE | Facility: CLINIC | Age: 72
End: 2022-05-05

## 2022-05-05 ENCOUNTER — TELEPHONE (OUTPATIENT)
Dept: WOUND CARE | Facility: CLINIC | Age: 72
End: 2022-05-05
Payer: MEDICARE

## 2022-05-05 ENCOUNTER — OFFICE VISIT (OUTPATIENT)
Dept: INTERNAL MEDICINE | Facility: CLINIC | Age: 72
End: 2022-05-05
Payer: MEDICARE

## 2022-05-05 VITALS
SYSTOLIC BLOOD PRESSURE: 126 MMHG | WEIGHT: 293 LBS | BODY MASS INDEX: 39.68 KG/M2 | HEIGHT: 72 IN | DIASTOLIC BLOOD PRESSURE: 66 MMHG | HEART RATE: 106 BPM | TEMPERATURE: 99 F

## 2022-05-05 VITALS
DIASTOLIC BLOOD PRESSURE: 66 MMHG | WEIGHT: 293 LBS | HEART RATE: 106 BPM | HEIGHT: 72 IN | OXYGEN SATURATION: 93 % | BODY MASS INDEX: 39.68 KG/M2 | SYSTOLIC BLOOD PRESSURE: 126 MMHG

## 2022-05-05 DIAGNOSIS — M19.90 OSTEOARTHRITIS, UNSPECIFIED OSTEOARTHRITIS TYPE, UNSPECIFIED SITE: ICD-10-CM

## 2022-05-05 DIAGNOSIS — L98.491 SKIN ULCER, LIMITED TO BREAKDOWN OF SKIN: Primary | ICD-10-CM

## 2022-05-05 DIAGNOSIS — D64.9 ANEMIA, UNSPECIFIED TYPE: ICD-10-CM

## 2022-05-05 DIAGNOSIS — M79.89 LEG SWELLING: Primary | ICD-10-CM

## 2022-05-05 DIAGNOSIS — I73.9 PAD (PERIPHERAL ARTERY DISEASE): ICD-10-CM

## 2022-05-05 PROCEDURE — 99999 PR PBB SHADOW E&M-EST. PATIENT-LVL V: CPT | Mod: PBBFAC,,,

## 2022-05-05 PROCEDURE — 99999 PR PBB SHADOW E&M-EST. PATIENT-LVL V: ICD-10-PCS | Mod: PBBFAC,,,

## 2022-05-05 PROCEDURE — 99213 PR OFFICE/OUTPT VISIT, EST, LEVL III, 20-29 MIN: ICD-10-PCS | Mod: GC,S$GLB,, | Performed by: STUDENT IN AN ORGANIZED HEALTH CARE EDUCATION/TRAINING PROGRAM

## 2022-05-05 PROCEDURE — 99213 OFFICE O/P EST LOW 20 MIN: CPT | Mod: GC,S$GLB,, | Performed by: STUDENT IN AN ORGANIZED HEALTH CARE EDUCATION/TRAINING PROGRAM

## 2022-05-05 PROCEDURE — 99999 PR PBB SHADOW E&M-EST. PATIENT-LVL IV: ICD-10-PCS | Mod: PBBFAC,GC,, | Performed by: STUDENT IN AN ORGANIZED HEALTH CARE EDUCATION/TRAINING PROGRAM

## 2022-05-05 PROCEDURE — 99999 PR PBB SHADOW E&M-EST. PATIENT-LVL IV: CPT | Mod: PBBFAC,GC,, | Performed by: STUDENT IN AN ORGANIZED HEALTH CARE EDUCATION/TRAINING PROGRAM

## 2022-05-05 NOTE — TELEPHONE ENCOUNTER
----- Message from Eboni Virk RN sent at 5/5/2022 10:06 AM CDT -----  Regarding: FW: advice how to wrap the wound    ----- Message -----  From: Jorge Luis Hunter  Sent: 5/5/2022   9:26 AM CDT  To: [facility] Staff  Subject: advice how to wrap the wound                         The Pt's  would like a call back to see if you need to see the Pt to re-wrap the wound, or will it be ok staying wrapped up from when it was wrapped on 5/02/22.    Please contact the caller    # [phone] ([place])

## 2022-05-05 NOTE — TELEPHONE ENCOUNTER
----- Message from Corie Salazar sent at 5/5/2022  4:49 PM CDT -----  Contact: [phone]  Pt's  called to advise that the pt is having severe swelling in her low extremities that have no produced sores that have spread to both legs. She has an appointment on Monday, but he thinks she needs to be seen sooner. Please Advise

## 2022-05-05 NOTE — PATIENT INSTRUCTIONS
Plan:  - hold lasix until seen by Dr. Naik  - referral to physical therapy  - increase activity as tolerated  - repeat CBC next week

## 2022-05-05 NOTE — PATIENT INSTRUCTIONS
Patient instructed to elevate legs when sitting   Patient was warn not to get dressing wet and to use a cast cover for showering  Should dressing become wet, patient is to remove by unrolling each layer from the top going down; shower with a mild soap, pat dry and place a wet-to-dry dressing over the wound cover with gauze, roll gauze and secure with paper tape.  Apply two ace wraps for compression and to secure bandages.    Patient to notify this office immediately to have a new dressing applied.  Take doxycycline 100 mg 1 capsule two times daily for 7 days as ordered.   Wound care instructions given to patient and daughter  Return to clinic 5/10/2022

## 2022-05-05 NOTE — PROGRESS NOTES
Subjective:       Patient ID: Ana Patel is a 72 y.o. female.    Chief Complaint: Wound Check (Skin ulcer, limited to breakdown of skin )    HPI  Review of Systems    Objective:      Physical Exam    Assessment:       1. Skin ulcer, limited to breakdown of skin           Wound Left lower;medial Leg (Active)        Pre-existing:    Primary Wound Type:    Side: Left   Orientation: lower;medial   Location: Leg   Wound Number:    Ankle-Brachial Index:    Pulses:    Removal Indication and Assessment:    Wound Outcome:    (Retired) Wound Type:    (Retired) Wound Length (cm):    (Retired) Wound Width (cm):    (Retired) Depth (cm):    Wound Description (Comments):    Removal Indications:    Wound Image    05/05/22 1724   Dressing Appearance No dressing;other (see comments) 05/05/22 1724   Drainage Amount None 05/05/22 1724   Appearance Intact;Other (see comments) 05/05/22 1724   Periwound Area Intact;Dry;Edematous 05/05/22 1724   Care Cleansed with:;Soap and water 05/05/22 1724   Dressing Applied;Foam 05/05/22 1724   Periwound Care Dry periwound area maintained 05/05/22 1724   Compression Unna's Boot;Three layer compression;Other (see comments) 05/05/22 1724   Dressing Change Due 05/10/22 05/05/22 1724            Wound Right anterior;lower;proximal Leg (Active)        Pre-existing:    Primary Wound Type:    Side: Right   Orientation: anterior;lower;proximal   Location: Leg   Wound Number:    Ankle-Brachial Index:    Pulses:    Removal Indication and Assessment:    Wound Outcome:    (Retired) Wound Type:    (Retired) Wound Length (cm):    (Retired) Wound Width (cm):    (Retired) Depth (cm):    Wound Description (Comments):    Removal Indications:    Wound Image   05/05/22 1724   Dressing Appearance Dry;Intact;Dried drainage 05/05/22 1724   Drainage Amount Large 05/05/22 1724   Drainage Characteristics/Odor Serosanguineous;Tan;Yellow 05/05/22 1724   Black (%), Wound Tissue Color 5 % 05/05/22 1724   Red (%), Wound  Tissue Color 60 % 05/05/22 1724   Yellow (%), Wound Tissue Color 35 % 05/05/22 1724   Periwound Area Intact;Dry;Edematous;Redness;Pink 05/05/22 1724   Wound Length (cm) 4.4 cm 05/05/22 1724   Wound Width (cm) 3.2 cm 05/05/22 1724   Wound Surface Area (cm^2) 14.08 cm^2 05/05/22 1724   Care Cleansed with:;Soap and water 05/05/22 1724   Dressing Applied;Absorptive Pad;Compression wrap 05/05/22 1724   Periwound Care Moisture barrier applied;Other (see comments) 05/05/22 1724   Compression Unna's Boot;Three layer compression;Other (see comments) 05/05/22 1724   Dressing Change Due 05/10/22 05/05/22 1724            Wound Right anterior;distal;lower Leg (Active)        Pre-existing:    Primary Wound Type:    Side: Right   Orientation: anterior;distal;lower   Location: Leg   Wound Number:    Ankle-Brachial Index:    Pulses:    Removal Indication and Assessment:    Wound Outcome:    (Retired) Wound Type:    (Retired) Wound Length (cm):    (Retired) Wound Width (cm):    (Retired) Depth (cm):    Wound Description (Comments):    Removal Indications:    Wound Image    05/05/22 1724   Dressing Appearance Dry;Intact;Dried drainage 05/05/22 1724   Drainage Amount Large 05/05/22 1724   Drainage Characteristics/Odor Serosanguineous;Tan 05/05/22 1724   Red (%), Wound Tissue Color 95 % 05/05/22 1724   Yellow (%), Wound Tissue Color 5 % 05/05/22 1724   Periwound Area Intact;Dry;Edematous;Redness 05/05/22 1724   Wound Length (cm) 2.8 cm 05/05/22 1724   Wound Width (cm) 3.4 cm 05/05/22 1724   Wound Surface Area (cm^2) 9.52 cm^2 05/05/22 1724   Care Cleansed with:;Soap and water 05/05/22 1724   Dressing Applied;Absorptive Pad 05/05/22 1724   Periwound Care Moisture barrier applied;Other (see comments) 05/05/22 1724   Compression Unna's Boot;Three layer compression;Other (see comments) 05/05/22 1724   Dressing Change Due 05/10/22 05/05/22 1724            Wound Right lower;proximal;lateral Leg (Active)        Pre-existing:    Primary Wound  Type:    Side: Right   Orientation: lower;proximal;lateral   Location: Leg   Wound Number:    Ankle-Brachial Index:    Pulses:    Removal Indication and Assessment:    Wound Outcome:    (Retired) Wound Type:    (Retired) Wound Length (cm):    (Retired) Wound Width (cm):    (Retired) Depth (cm):    Wound Description (Comments):    Removal Indications:    Wound Image   05/05/22 1724   Dressing Appearance Dry;Intact;Dried drainage 05/05/22 1724   Drainage Characteristics/Odor Serosanguineous;Tan;Yellow 05/05/22 1724   Black (%), Wound Tissue Color 30 % 05/05/22 1724   Yellow (%), Wound Tissue Color 70 % 05/05/22 1724   Periwound Area Intact;Dry;Edematous;Redness 05/05/22 1724   Wound Length (cm) 0.6 cm 05/05/22 1724   Wound Width (cm) 0.6 cm 05/05/22 1724   Wound Surface Area (cm^2) 0.36 cm^2 05/05/22 1724   Care Cleansed with:;Soap and water 05/05/22 1724   Dressing Applied;Absorptive Pad 05/05/22 1724   Periwound Care Moisture barrier applied;Other (see comments) 05/05/22 1724   Compression Unna's Boot;Three layer compression;Other (see comments) 05/05/22 1724   Dressing Change Due 05/10/22 05/05/22 1724            Wound Right lower;lateral;distal Leg (Active)        Pre-existing:    Primary Wound Type:    Side: Right   Orientation: lower;lateral;distal   Location: Leg   Wound Number:    Ankle-Brachial Index:    Pulses:    Removal Indication and Assessment:    Wound Outcome:    (Retired) Wound Type:    (Retired) Wound Length (cm):    (Retired) Wound Width (cm):    (Retired) Depth (cm):    Wound Description (Comments):    Removal Indications:    Wound Image   05/05/22 1724   Dressing Appearance Dry;Intact;Dried drainage 05/05/22 1724   Drainage Amount Moderate 05/05/22 1724   Drainage Characteristics/Odor Serosanguineous;Tan;Yellow 05/05/22 1724   Yellow (%), Wound Tissue Color 100 % 05/05/22 1724   Periwound Area Intact;Dry;Edematous;Redness 05/05/22 1724   Wound Length (cm) 0.5 cm 05/05/22 1724   Wound Width (cm)  0.6 cm 05/05/22 1724   Wound Surface Area (cm^2) 0.3 cm^2 05/05/22 1724   Care Cleansed with:;Soap and water 05/05/22 1724   Dressing Applied;Absorptive Pad 05/05/22 1724   Periwound Care Moisture barrier applied;Other (see comments) 05/05/22 1724   Compression Unna's Boot;Three layer compression;Other (see comments) 05/05/22 1724   Dressing Change Due 05/10/22 05/05/22 1724     Ana was seen in wound care clinic today accompanied by her daughter, placed in sitting position with legs elevated in treatment chair.  Left leg dressed with tubi- compression and right leg with intact calamine unna boot with dried drainage to anterior dressing.  Compression dressing and tubi- removed and legs washed with Easi-clense sponge and dried thoroughly.  Right anterior ankle noted with a bruise, redness to lower extremity and 3+edema to leg, foot and toes.  Left medial lower leg with two intact blisters, 3+ edema to leg and foot.  Bilateral calamine unna boots applied with calmoseptine barrier cream to anju-wound skin of right lower extremity wounds, mepilex lite foam to left medial blisters, Copa foam to right anterior ankle and ABD pads to ankles.  A three layer calamine unna boot applied with calamine layer, kerlix roll gauze layer and coban cohesive layer to bilateral lower extremities.  Patient's feet positioned at a 90 degree angle.  Each layer was applied using a spiral technique avoiding creases and folds with the calamine unna boot first followed by the kerlix roll gauze and the coban.  Each layer started behind the first metatarsal and ended below the tibial tubercle of the knee. There was overlap of each turn half the width of the previous turn.  Patient states she was seen in Internal Medicine today, patient reports weight gain of 11 pounds in one week with an overall increase of 38 pounds in one month- an order for home health was placed.  Patient reports she was also seen in the Emergency Room yesterday  5/4/2022 for complaint of swelling - patient was discharged from ED on doxycycline 100 mg 1 capsule by mouth two times daily for 7 days.  Patient will return to wound care clinic Tuesday, 5/10/2022.         Plan:       Calamine unna boot dressings to BLE as detailed above  Patient instructed to elevate legs when sitting   Patient was warn not to get dressing wet and to use a cast cover for showering  Should dressing become wet, patient is to remove by unrolling each layer from the top going down; shower with a mild soap, pat dry and place a wet-to-dry dressing over the wound cover with gauze, roll gauze and secure with paper tape.  Apply two ace wraps for compression and to secure bandages.    Patient to notify this office immediately to have a new dressing applied.  Take doxycycline 100 mg 1 capsule two times daily for 7 days as ordered.   Wound care instructions given to patient and daughter  Return to clinic 5/10/2022

## 2022-05-05 NOTE — TELEPHONE ENCOUNTER
Patient  called again to discuss issues. Spoke to him states he would like a call back from provider to discuss getting patient admitted to hospital.

## 2022-05-05 NOTE — PROGRESS NOTES
RESIDENT CLINIC PROGRESS NOTE    Name: Ana Patel  : 1950  Date of Service: 2022   PCP: Gordo Naik MD    Reason for visit:   Chief Complaint   Patient presents with    Follow-up       HPI: Ana Patel is a 72 y.o. female who presents to clinic for leg swelling follow up. At our last visit, we had increased her lasix to 40 mg BID. BNP was normal and echo with normal EF and low CVP. Patient was subsequently seen in ER after increased weeping from her right> left leg. She was noted to have normal CXR. She was noted to have increasing redness of right leg. She was discharged with doxycycline for concern of early cellulits. She has not started taking this. Notably, patient was found to have hemoglobin 9.6 from baseline 13.9 2 months ago. She denies bloody or dark stool, blood in urine, or other over bleeding. She does continue to describe shortness of breath and lightheadedness.     Patient's daughter states that she continues to take norco  mg up to three times daily.       Patient presents, in wheel chair, with daughter.    Medications:   Current Outpatient Medications:     acetaminophen (TYLENOL) 500 mg Cap, , Disp: , Rfl:     amLODIPine (NORVASC) 5 MG tablet, TAKE 1 TABLET ONE TIME DAILY, Disp: 90 tablet, Rfl: 11    benzocaine 20 % Liqd, , Disp: , Rfl:     betamethasone dipropionate 0.05 % cream, AAA BID x 1-2 wks then prn flares only. Avoid face, armpits, groin., Disp: 45 g, Rfl: 1    clobetasol 0.05% (TEMOVATE) 0.05 % Oint, Apply topically 2 (two) times daily. 30 mg tube x 1. Apply 2 times daily to intact skin around ulcerations, avoid wound base., Disp: 30 g, Rfl: 0    clopidogreL (PLAVIX) 75 mg tablet, Take 1 tablet (75 mg total) by mouth once daily., Disp: 30 tablet, Rfl: 11    doxycycline (VIBRAMYCIN) 100 MG Cap, Take 1 capsule (100 mg total) by mouth 2 (two) times daily. for 7 days, Disp: 14 capsule, Rfl: 0    erythromycin (ROMYCIN) ophthalmic ointment, Place  into the left eye every 6 (six) hours., Disp: 3.5 g, Rfl: 1    ezetimibe (ZETIA) 10 mg tablet, Take 1 tablet (10 mg total) by mouth once daily., Disp: 90 tablet, Rfl: 3    fexofenadine (ALLEGRA) 180 MG tablet, Take 180 mg by mouth daily as needed., Disp: , Rfl:     FLUZONE HIGH-DOSE 2018-19, PF, 180 mcg/0.5 mL vaccine, ADM 0.5ML IM UTD, Disp: , Rfl: 0    furosemide (LASIX) 40 MG tablet, Take 1 tablet (40 mg total) by mouth 2 (two) times a day., Disp: 60 tablet, Rfl: 0    HYDROcodone-acetaminophen (NORCO)  mg per tablet, Take 1 tablet by mouth every 12 (twelve) hours as needed for Pain., Disp: 40 tablet, Rfl: 0    lisinopriL (PRINIVIL,ZESTRIL) 20 MG tablet, Take 1 tablet (20 mg total) by mouth once daily., Disp: 90 tablet, Rfl: 11    multivitamin capsule, Take 1 capsule by mouth once daily., Disp: , Rfl:     pentoxifylline (TRENTAL) 400 mg TbSR, 1 po tid with meals (Patient taking differently: Take 400 mg by mouth 3 (three) times daily with meals. 1 po tid with meals), Disp: 90 tablet, Rfl: 2    triamcinolone acetonide 0.1% (KENALOG) 0.1 % ointment, Apply topically 2 (two) times daily as needed (rash at legs)., Disp: 454 g, Rfl: 3    triamcinolone acetonide 0.5% (KENALOG) 0.5 % Crea, Apply topically 2 (two) times daily., Disp: 454 g, Rfl: 0    collagenase (SANTYL) ointment, Apply thickly to wound base once daily after cleansing. (Patient not taking: No sig reported), Disp: 50 g, Rfl: 1     Review of Systems:   Review of Systems   Constitutional: Negative for chills, diaphoresis, fever, malaise/fatigue and weight loss.   HENT: Negative for congestion, hearing loss, sinus pain, sore throat and tinnitus.    Eyes: Negative for blurred vision, pain, discharge and redness.   Respiratory: Positive for shortness of breath. Negative for cough, hemoptysis, sputum production and wheezing.    Cardiovascular: Positive for leg swelling. Negative for chest pain, palpitations, orthopnea and claudication.    Gastrointestinal: Negative for abdominal pain, blood in stool, constipation, diarrhea, heartburn, melena, nausea and vomiting.   Genitourinary: Negative for dysuria, frequency and urgency.   Musculoskeletal: Negative for back pain, joint pain, myalgias and neck pain.   Skin: Negative for itching and rash.   Neurological: Negative for dizziness, tremors, speech change, focal weakness, seizures, weakness and headaches.   Endo/Heme/Allergies: Negative for environmental allergies and polydipsia. Does not bruise/bleed easily.       Vitals:   There were no vitals filed for this visit.  There is no height or weight on file to calculate BMI.    Physical Exam:   Physical Exam  Constitutional:       General: She is not in acute distress.     Appearance: Normal appearance. She is normal weight. She is not ill-appearing, toxic-appearing or diaphoretic.   HENT:      Nose: No congestion or rhinorrhea.   Eyes:      General: No scleral icterus.        Right eye: No discharge.         Left eye: No discharge.   Neck:      Comments: Unable to assess JVD  Cardiovascular:      Rate and Rhythm: Normal rate and regular rhythm.      Pulses: Normal pulses.      Heart sounds: Normal heart sounds. No murmur heard.    No friction rub. No gallop.   Pulmonary:      Effort: Pulmonary effort is normal. No respiratory distress.      Breath sounds: No stridor. No wheezing, rhonchi or rales.   Chest:      Chest wall: No tenderness.   Abdominal:      General: Abdomen is flat. Bowel sounds are normal. There is no distension.      Palpations: Abdomen is soft. There is no mass.      Tenderness: There is no abdominal tenderness. There is no guarding.   Musculoskeletal:      Right lower leg: Edema (to mid thigh) present.      Left lower leg: Edema (to mid thigh) present.      Comments: Redness of right leg   Skin:     General: Skin is warm and dry.      Coloration: Skin is not jaundiced.   Neurological:      General: No focal deficit present.      Mental  Status: She is alert and oriented to person, place, and time.         Labs: Previous labs reviewed.    Imaging: Previous imaging reviewed.     Assessment/Plan:   Leg swelling  Shortness of breath  Given echo findings and limited improvement with lasix, patient is unlikely intravascularly volume overloaded and increasing swelling may represent worsening venous insufficiency. I have ordered outpatient physical therapy for her and have recommended increasing activity as tolerated. Patient will see wound care today. Given her lightheadedness, will hold lasix for now and reassess when seen by Dr. Naik. Additionally, family believes patient may benefit from home health services. We will defer this decision to Dr. Naik next week. Agree to complete doxycycline for potential cellulitis.    Anemia  Reduced hb from baseline, will repeat prior to follow up in PCP clinic - no evidence of overt bleeding.    Follow up in 1 week with Dr. Naik.    Discussed with Dr. Nash

## 2022-05-05 NOTE — TELEPHONE ENCOUNTER
No new care gaps identified.  [facility] Embedded Care Gaps. Reference number: [id number]. 5/05/2022   4:36:56 PM CDT

## 2022-05-05 NOTE — TELEPHONE ENCOUNTER
Returned call and spoke with patient, who advised dressing is wet, patient scheduled for dressing change today at 3:30pm.

## 2022-05-06 ENCOUNTER — PATIENT MESSAGE (OUTPATIENT)
Dept: INTERNAL MEDICINE | Facility: CLINIC | Age: 72
End: 2022-05-06
Payer: MEDICARE

## 2022-05-06 LAB
HCV AB SERPL QL IA: NEGATIVE
HIV 1+2 AB+HIV1 P24 AG SERPL QL IA: NEGATIVE

## 2022-05-06 RX ORDER — HYDROCODONE BITARTRATE AND ACETAMINOPHEN 10; 325 MG/1; MG/1
1 TABLET ORAL EVERY 12 HOURS PRN
Qty: 40 TABLET | Refills: 0 | Status: SHIPPED | OUTPATIENT
Start: 2022-05-06 | End: 2022-05-19

## 2022-05-12 RX ORDER — HYDROXYCHLOROQUINE SULFATE 200 MG/1
TABLET, FILM COATED ORAL
COMMUNITY
End: 2022-10-19

## 2022-05-13 RX ORDER — HYDROXYCHLOROQUINE SULFATE 200 MG/1
200 TABLET, FILM COATED ORAL DAILY
Qty: 180 TABLET | Refills: 2 | Status: SHIPPED | OUTPATIENT
Start: 2022-05-13 | End: 2023-05-31

## 2022-05-17 ENCOUNTER — PATIENT MESSAGE (OUTPATIENT)
Dept: INTERNAL MEDICINE | Facility: CLINIC | Age: 72
End: 2022-05-17
Payer: MEDICARE

## 2022-05-17 DIAGNOSIS — I73.9 PAD (PERIPHERAL ARTERY DISEASE): Primary | ICD-10-CM

## 2022-05-17 DIAGNOSIS — M19.90 OSTEOARTHRITIS, UNSPECIFIED OSTEOARTHRITIS TYPE, UNSPECIFIED SITE: ICD-10-CM

## 2022-05-17 DIAGNOSIS — I10 ESSENTIAL HYPERTENSION: ICD-10-CM

## 2022-05-18 DIAGNOSIS — E11.9 TYPE 2 DIABETES MELLITUS WITHOUT COMPLICATION: ICD-10-CM

## 2022-05-18 RX ORDER — LISINOPRIL 20 MG/1
20 TABLET ORAL DAILY
Qty: 90 TABLET | Refills: 3 | Status: SHIPPED | OUTPATIENT
Start: 2022-05-18 | End: 2023-06-27

## 2022-05-18 NOTE — TELEPHONE ENCOUNTER
No new care gaps identified.  [facility] Embedded Care Gaps. Reference number: [id number]. 5/17/2022   8:53:58 PM CDT

## 2022-05-18 NOTE — TELEPHONE ENCOUNTER
Refill Routing Note   Medication(s) are not appropriate for processing by [facility] for the following reason(s):      - Patient has been seen in the ED/Hospital since the last PCP visit    [facility] action(s):  Defer       Medication Therapy Plan: FOV;  Medication reconciliation completed: No     Appointments  past 12m or future 3m with PCP    Date Provider   Last Visit   3/11/2022 Gordo Naik MD   Next Visit   6/17/2022 Gordo Naik MD   ED visits in past 90 days: 1        Note composed:9:10 AM 05/18/2022

## 2022-05-19 ENCOUNTER — PATIENT MESSAGE (OUTPATIENT)
Dept: INTERNAL MEDICINE | Facility: CLINIC | Age: 72
End: 2022-05-19
Payer: MEDICARE

## 2022-05-19 DIAGNOSIS — E78.5 HYPERLIPIDEMIA, UNSPECIFIED HYPERLIPIDEMIA TYPE: ICD-10-CM

## 2022-05-19 DIAGNOSIS — I10 ESSENTIAL HYPERTENSION: ICD-10-CM

## 2022-05-19 RX ORDER — ROSUVASTATIN CALCIUM 5 MG/1
TABLET, COATED ORAL
Qty: 90 TABLET | Refills: 11 | Status: SHIPPED | OUTPATIENT
Start: 2022-05-19 | End: 2023-06-28

## 2022-05-19 RX ORDER — FUROSEMIDE 20 MG/1
TABLET ORAL
Qty: 90 TABLET | Refills: 11 | OUTPATIENT
Start: 2022-05-19

## 2022-05-19 RX ORDER — AMLODIPINE BESYLATE 5 MG/1
TABLET ORAL
Qty: 90 TABLET | Refills: 11 | Status: SHIPPED | OUTPATIENT
Start: 2022-05-19 | End: 2023-06-19

## 2022-05-19 RX ORDER — TRAMADOL HYDROCHLORIDE 50 MG/1
50 TABLET ORAL EVERY 12 HOURS PRN
Qty: 60 TABLET | Refills: 4 | Status: SHIPPED | OUTPATIENT
Start: 2022-05-19 | End: 2022-05-25 | Stop reason: SDUPTHER

## 2022-05-19 NOTE — TELEPHONE ENCOUNTER
No new care gaps identified.  [facility] Embedded Care Gaps. Reference number: [id number]. 5/19/2022   1:24:16 PM CDT

## 2022-05-19 NOTE — TELEPHONE ENCOUNTER
Refill Routing Note   Medication(s) are not appropriate for processing by [facility] for the following reason(s):      - Patient has been seen in the ED/Hospital since the last PCP visit    [facility] action(s):  Defer  Quick Discontinue       Medication Therapy Plan: DEFER amlodipine and rosuvastatin [Last PCP Visit: 3/11/2022  Last ED Visit: 5/4/2022]; QUICKDC furosemide (no longer taking this dose)  Medication reconciliation completed: No     Appointments  past 12m or future 3m with PCP    Date Provider   Last Visit   3/11/2022 Gordo Naik MD   Next Visit   6/17/2022 Gordo Naik MD   ED visits in past 90 days: 1        Note composed:2:43 PM 05/19/2022

## 2022-05-25 ENCOUNTER — PATIENT MESSAGE (OUTPATIENT)
Dept: INTERNAL MEDICINE | Facility: CLINIC | Age: 72
End: 2022-05-25
Payer: MEDICARE

## 2022-05-25 DIAGNOSIS — M19.90 OSTEOARTHRITIS, UNSPECIFIED OSTEOARTHRITIS TYPE, UNSPECIFIED SITE: ICD-10-CM

## 2022-05-25 DIAGNOSIS — I73.9 PAD (PERIPHERAL ARTERY DISEASE): ICD-10-CM

## 2022-05-25 RX ORDER — TRAMADOL HYDROCHLORIDE 50 MG/1
50 TABLET ORAL EVERY 12 HOURS PRN
Qty: 60 TABLET | Refills: 4 | Status: SHIPPED | OUTPATIENT
Start: 2022-05-25 | End: 2022-10-17 | Stop reason: SDUPTHER

## 2022-05-30 ENCOUNTER — PATIENT MESSAGE (OUTPATIENT)
Dept: ADMINISTRATIVE | Facility: HOSPITAL | Age: 72
End: 2022-05-30
Payer: MEDICARE

## 2022-05-31 ENCOUNTER — PATIENT MESSAGE (OUTPATIENT)
Dept: INTERNAL MEDICINE | Facility: CLINIC | Age: 72
End: 2022-05-31
Payer: MEDICARE

## 2022-05-31 RX ORDER — FEXOFENADINE HCL AND PSEUDOEPHEDRINE HCI 180; 240 MG/1; MG/1
1 TABLET, EXTENDED RELEASE ORAL DAILY
Qty: 10 TABLET | Refills: 0 | Status: CANCELLED | OUTPATIENT
Start: 2022-05-31 | End: 2022-06-10

## 2022-05-31 NOTE — TELEPHONE ENCOUNTER
No new care gaps identified.  [facility] Embedded Care Gaps. Reference number: [id number]. 5/31/2022   1:42:29 PM CDT

## 2022-06-01 ENCOUNTER — PATIENT MESSAGE (OUTPATIENT)
Dept: INTERNAL MEDICINE | Facility: CLINIC | Age: 72
End: 2022-06-01
Payer: MEDICARE

## 2022-06-01 RX ORDER — MINERAL OIL
180 ENEMA (ML) RECTAL DAILY PRN
Qty: 30 TABLET | Refills: 11 | Status: SHIPPED | OUTPATIENT
Start: 2022-06-01 | End: 2022-06-10 | Stop reason: SDUPTHER

## 2022-06-08 ENCOUNTER — PATIENT MESSAGE (OUTPATIENT)
Dept: INTERNAL MEDICINE | Facility: CLINIC | Age: 72
End: 2022-06-08
Payer: MEDICARE

## 2022-06-10 ENCOUNTER — PATIENT MESSAGE (OUTPATIENT)
Dept: INTERNAL MEDICINE | Facility: CLINIC | Age: 72
End: 2022-06-10
Payer: MEDICARE

## 2022-06-10 RX ORDER — MINERAL OIL
180 ENEMA (ML) RECTAL DAILY PRN
Qty: 30 TABLET | Refills: 11 | Status: SHIPPED | OUTPATIENT
Start: 2022-06-10 | End: 2023-06-19

## 2022-06-10 NOTE — TELEPHONE ENCOUNTER
No new care gaps identified.  [facility] Embedded Care Gaps. Reference number: [id number]. 6/10/2022   8:45:44 AM CDT

## 2022-06-17 ENCOUNTER — OFFICE VISIT (OUTPATIENT)
Dept: INTERNAL MEDICINE | Facility: CLINIC | Age: 72
End: 2022-06-17
Payer: MEDICARE

## 2022-06-17 VITALS
DIASTOLIC BLOOD PRESSURE: 84 MMHG | HEART RATE: 81 BPM | HEIGHT: 72 IN | BODY MASS INDEX: 45.93 KG/M2 | OXYGEN SATURATION: 94 % | SYSTOLIC BLOOD PRESSURE: 120 MMHG

## 2022-06-17 DIAGNOSIS — M19.90 OSTEOARTHRITIS, UNSPECIFIED OSTEOARTHRITIS TYPE, UNSPECIFIED SITE: ICD-10-CM

## 2022-06-17 DIAGNOSIS — L97.929 VENOUS STASIS ULCERS OF BOTH LOWER EXTREMITIES: Primary | ICD-10-CM

## 2022-06-17 DIAGNOSIS — I83.019 VENOUS STASIS ULCERS OF BOTH LOWER EXTREMITIES: Primary | ICD-10-CM

## 2022-06-17 DIAGNOSIS — I83.029 VENOUS STASIS ULCERS OF BOTH LOWER EXTREMITIES: Primary | ICD-10-CM

## 2022-06-17 DIAGNOSIS — L97.919 VENOUS STASIS ULCERS OF BOTH LOWER EXTREMITIES: Primary | ICD-10-CM

## 2022-06-17 DIAGNOSIS — I73.9 PAD (PERIPHERAL ARTERY DISEASE): ICD-10-CM

## 2022-06-17 PROCEDURE — 3079F DIAST BP 80-89 MM HG: CPT | Mod: CPTII,S$GLB,, | Performed by: INTERNAL MEDICINE

## 2022-06-17 PROCEDURE — 99999 PR PBB SHADOW E&M-EST. PATIENT-LVL V: CPT | Mod: PBBFAC,,, | Performed by: INTERNAL MEDICINE

## 2022-06-17 PROCEDURE — 99214 PR OFFICE/OUTPT VISIT, EST, LEVL IV, 30-39 MIN: ICD-10-PCS | Mod: S$GLB,,, | Performed by: INTERNAL MEDICINE

## 2022-06-17 PROCEDURE — 3074F PR MOST RECENT SYSTOLIC BLOOD PRESSURE < 130 MM HG: ICD-10-PCS | Mod: CPTII,S$GLB,, | Performed by: INTERNAL MEDICINE

## 2022-06-17 PROCEDURE — 3079F PR MOST RECENT DIASTOLIC BLOOD PRESSURE 80-89 MM HG: ICD-10-PCS | Mod: CPTII,S$GLB,, | Performed by: INTERNAL MEDICINE

## 2022-06-17 PROCEDURE — 4010F PR ACE/ARB THEARPY RXD/TAKEN: ICD-10-PCS | Mod: CPTII,S$GLB,, | Performed by: INTERNAL MEDICINE

## 2022-06-17 PROCEDURE — 99499 UNLISTED E&M SERVICE: CPT | Mod: S$GLB,,, | Performed by: INTERNAL MEDICINE

## 2022-06-17 PROCEDURE — 3008F BODY MASS INDEX DOCD: CPT | Mod: CPTII,S$GLB,, | Performed by: INTERNAL MEDICINE

## 2022-06-17 PROCEDURE — 99999 PR PBB SHADOW E&M-EST. PATIENT-LVL V: ICD-10-PCS | Mod: PBBFAC,,, | Performed by: INTERNAL MEDICINE

## 2022-06-17 PROCEDURE — 1159F MED LIST DOCD IN RCRD: CPT | Mod: CPTII,S$GLB,, | Performed by: INTERNAL MEDICINE

## 2022-06-17 PROCEDURE — 1101F PT FALLS ASSESS-DOCD LE1/YR: CPT | Mod: CPTII,S$GLB,, | Performed by: INTERNAL MEDICINE

## 2022-06-17 PROCEDURE — 3288F FALL RISK ASSESSMENT DOCD: CPT | Mod: CPTII,S$GLB,, | Performed by: INTERNAL MEDICINE

## 2022-06-17 PROCEDURE — 99214 OFFICE O/P EST MOD 30 MIN: CPT | Mod: S$GLB,,, | Performed by: INTERNAL MEDICINE

## 2022-06-17 PROCEDURE — 3288F PR FALLS RISK ASSESSMENT DOCUMENTED: ICD-10-PCS | Mod: CPTII,S$GLB,, | Performed by: INTERNAL MEDICINE

## 2022-06-17 PROCEDURE — 1101F PR PT FALLS ASSESS DOC 0-1 FALLS W/OUT INJ PAST YR: ICD-10-PCS | Mod: CPTII,S$GLB,, | Performed by: INTERNAL MEDICINE

## 2022-06-17 PROCEDURE — 1159F PR MEDICATION LIST DOCUMENTED IN MEDICAL RECORD: ICD-10-PCS | Mod: CPTII,S$GLB,, | Performed by: INTERNAL MEDICINE

## 2022-06-17 PROCEDURE — 3008F PR BODY MASS INDEX (BMI) DOCUMENTED: ICD-10-PCS | Mod: CPTII,S$GLB,, | Performed by: INTERNAL MEDICINE

## 2022-06-17 PROCEDURE — 1125F AMNT PAIN NOTED PAIN PRSNT: CPT | Mod: CPTII,S$GLB,, | Performed by: INTERNAL MEDICINE

## 2022-06-17 PROCEDURE — 3074F SYST BP LT 130 MM HG: CPT | Mod: CPTII,S$GLB,, | Performed by: INTERNAL MEDICINE

## 2022-06-17 PROCEDURE — 1160F PR REVIEW ALL MEDS BY PRESCRIBER/CLIN PHARMACIST DOCUMENTED: ICD-10-PCS | Mod: CPTII,S$GLB,, | Performed by: INTERNAL MEDICINE

## 2022-06-17 PROCEDURE — 1160F RVW MEDS BY RX/DR IN RCRD: CPT | Mod: CPTII,S$GLB,, | Performed by: INTERNAL MEDICINE

## 2022-06-17 PROCEDURE — 1125F PR PAIN SEVERITY QUANTIFIED, PAIN PRESENT: ICD-10-PCS | Mod: CPTII,S$GLB,, | Performed by: INTERNAL MEDICINE

## 2022-06-17 PROCEDURE — 4010F ACE/ARB THERAPY RXD/TAKEN: CPT | Mod: CPTII,S$GLB,, | Performed by: INTERNAL MEDICINE

## 2022-06-17 PROCEDURE — 99499 RISK ADDL DX/OHS AUDIT: ICD-10-PCS | Mod: S$GLB,,, | Performed by: INTERNAL MEDICINE

## 2022-06-17 RX ORDER — HYDROCODONE BITARTRATE AND ACETAMINOPHEN 5; 325 MG/1; MG/1
1 TABLET ORAL
Qty: 10 TABLET | Refills: 0 | Status: SHIPPED | OUTPATIENT
Start: 2022-06-17 | End: 2022-07-01

## 2022-06-17 NOTE — PROGRESS NOTES
Subjective:       Patient ID: Ana Patel is a 72 y.o. female.    Chief Complaint: Follow-up (3mt )    Patient is here for followup for chronic conditions/hosp f/u.    She was at  for hospitalization. Told she had staph infection. Has had wound care and therapy at home with some improvement over last week.     Will be seeing a different vascular specialist at  also.    She was discharged abt 1 month ago.    Still has pain RLE wound when nurse does wound cleaning.  She has run out hydrocodone. Uses Tramadol for chronic OA pains. Would like hydrocodone refill for severe pains she gets when nurse does home wound care.    No other new health issues.    Still smoking.    Review of Systems   Constitutional: Negative for activity change, appetite change and unexpected weight change.   Eyes: Negative for visual disturbance.   Respiratory: Negative for cough, chest tightness and shortness of breath.    Cardiovascular: Negative for chest pain.   Gastrointestinal: Negative for abdominal distention and abdominal pain.   Genitourinary: Negative for difficulty urinating and urgency.   Musculoskeletal: Positive for arthralgias. Negative for joint swelling.   Skin: Positive for rash (necrobiosis bilat legs and eczema) and wound (RLE open sores).   Neurological: Negative for tremors, syncope and weakness.           Objective:      Physical Exam  Vitals reviewed.   Constitutional:       General: She is not in acute distress.     Appearance: Normal appearance. She is well-developed. She is obese. She is not ill-appearing, toxic-appearing or diaphoretic.      Comments: Diff rising to exam table 2/2 wt and jts   HENT:      Head: Normocephalic and atraumatic.   Eyes:      General: No scleral icterus.     Pupils: Pupils are equal, round, and reactive to light.   Neck:      Thyroid: No thyromegaly.   Cardiovascular:      Rate and Rhythm: Normal rate and regular rhythm.      Heart sounds: Normal heart sounds. No murmur heard.     No friction rub. No gallop.   Pulmonary:      Effort: Pulmonary effort is normal. No respiratory distress.      Breath sounds: Normal breath sounds. No wheezing or rales.   Abdominal:      General: Bowel sounds are normal. There is no distension.      Palpations: Abdomen is soft. There is no mass.      Tenderness: There is no abdominal tenderness. There is no guarding or rebound.   Musculoskeletal:         General: Normal range of motion.      Cervical back: Normal range of motion.      Comments: Valgus LLE leg.  Knee crepitus bilat.   Lymphadenopathy:      Cervical: No cervical adenopathy.   Skin:     Comments: RLE lower leg in ace wrap, sores not seen today    R foot unable to palpate pulses, but foot is not cold, CRT<3 sec   Neurological:      Mental Status: She is alert and oriented to person, place, and time.   Psychiatric:         Speech: Speech normal.         Behavior: Behavior normal.         Assessment:       1. Venous stasis ulcers of both lower extremities    2. PAD (peripheral artery disease)    3. Osteoarthritis, unspecified osteoarthritis type, unspecified site        Plan:       Ana was seen today for follow-up.    Diagnoses and all orders for this visit:    Venous stasis ulcers of both lower extremities  Following with wound care and vascular surgery at     PAD (peripheral artery disease)  -     HYDROcodone-acetaminophen (NORCO) 5-325 mg per tablet; Take 1 tablet by mouth every 24 hours as needed for Pain.  Osteoarthritis, unspecified osteoarthritis type, unspecified site  -     HYDROcodone-acetaminophen (NORCO) 5-325 mg per tablet; Take 1 tablet by mouth every 24 hours as needed for Pain.  For wound care needs  Lengthy discussion re importance of smoking cessation, not committed to quit yet    Next time recheck blood work, general panel    Health Maintenance       Date Due Completion Date    Diabetes Urine Screening Never done ---    Foot Exam Never done ---    Sign Pain Contract Never done  ---    Complete Opioid Risk Tool Never done ---    Eye Exam 09/28/2018 9/28/2017    Colorectal Cancer Screening 05/28/2019 5/28/2018    Mammogram 09/03/2020 9/3/2019    Shingles Vaccine (3 of 3) 11/20/2020 9/25/2020    COVID-19 Vaccine (4 - Booster for Moderna series) 03/15/2022 11/15/2021    Hemoglobin A1c 05/10/2022 11/10/2021    Lipid Panel 11/10/2022 11/10/2021    Low Dose Statin 06/17/2023 6/17/2022    TETANUS VACCINE 11/10/2031 11/10/2021    Override on 8/24/2017: Declined      She does not have dm2.  She wants to wait on screenings for now    Follow up in about 4 months (around 10/17/2022) for Moderna covid vaccine please.    Future Appointments   Date Time Provider Department Center   6/22/2022  1:00 PM MODERNA VACCINE, [facility]   10/17/2022  1:40 PM Gordo Naik MD [facility]

## 2022-06-22 ENCOUNTER — IMMUNIZATION (OUTPATIENT)
Dept: PHARMACY | Facility: CLINIC | Age: 72
End: 2022-06-22
Payer: MEDICARE

## 2022-06-22 DIAGNOSIS — Z23 NEED FOR VACCINATION: Primary | ICD-10-CM

## 2022-06-30 ENCOUNTER — PATIENT MESSAGE (OUTPATIENT)
Dept: INTERNAL MEDICINE | Facility: CLINIC | Age: 72
End: 2022-06-30
Payer: MEDICARE

## 2022-06-30 DIAGNOSIS — M19.90 OSTEOARTHRITIS, UNSPECIFIED OSTEOARTHRITIS TYPE, UNSPECIFIED SITE: ICD-10-CM

## 2022-06-30 DIAGNOSIS — I73.9 PAD (PERIPHERAL ARTERY DISEASE): ICD-10-CM

## 2022-06-30 NOTE — TELEPHONE ENCOUNTER
No new care gaps identified.  [facility] Embedded Care Gaps. Reference number: [id number]. 6/30/2022   9:23:50 AM CDT

## 2022-07-01 RX ORDER — HYDROCODONE BITARTRATE AND ACETAMINOPHEN 5; 325 MG/1; MG/1
2 TABLET ORAL
Qty: 60 TABLET | Refills: 0 | Status: SHIPPED | OUTPATIENT
Start: 2022-07-01 | End: 2022-08-16

## 2022-07-05 DIAGNOSIS — I73.9 PAD (PERIPHERAL ARTERY DISEASE): ICD-10-CM

## 2022-07-05 DIAGNOSIS — M19.90 OSTEOARTHRITIS, UNSPECIFIED OSTEOARTHRITIS TYPE, UNSPECIFIED SITE: ICD-10-CM

## 2022-07-05 RX ORDER — TRAMADOL HYDROCHLORIDE 50 MG/1
50 TABLET ORAL EVERY 12 HOURS PRN
Qty: 60 TABLET | Refills: 4 | Status: CANCELLED | OUTPATIENT
Start: 2022-07-05

## 2022-07-05 NOTE — TELEPHONE ENCOUNTER
Called patient regarding the request for her tramadol. Advised her that  sent in a refill request back last month on 5/25/22 and 4 refills. Patient stated that she will check with the pharmacy-[facility]

## 2022-07-11 ENCOUNTER — PATIENT MESSAGE (OUTPATIENT)
Dept: ADMINISTRATIVE | Facility: HOSPITAL | Age: 72
End: 2022-07-11
Payer: MEDICARE

## 2022-07-11 ENCOUNTER — PATIENT MESSAGE (OUTPATIENT)
Dept: INTERNAL MEDICINE | Facility: CLINIC | Age: 72
End: 2022-07-11
Payer: MEDICARE

## 2022-07-11 DIAGNOSIS — I83.019 VENOUS STASIS ULCERS OF BOTH LOWER EXTREMITIES: ICD-10-CM

## 2022-07-11 DIAGNOSIS — I73.9 PAD (PERIPHERAL ARTERY DISEASE): Primary | ICD-10-CM

## 2022-07-11 DIAGNOSIS — L97.929 VENOUS STASIS ULCERS OF BOTH LOWER EXTREMITIES: ICD-10-CM

## 2022-07-11 DIAGNOSIS — I83.029 VENOUS STASIS ULCERS OF BOTH LOWER EXTREMITIES: ICD-10-CM

## 2022-07-11 DIAGNOSIS — M19.90 OSTEOARTHRITIS, UNSPECIFIED OSTEOARTHRITIS TYPE, UNSPECIFIED SITE: ICD-10-CM

## 2022-07-11 DIAGNOSIS — L97.919 VENOUS STASIS ULCERS OF BOTH LOWER EXTREMITIES: ICD-10-CM

## 2022-07-11 NOTE — TELEPHONE ENCOUNTER
Hi, please send to [facility] dme    Orders Placed This Encounter    WHEELCHAIR FOR HOME USE       Thank you, Gordo Naik

## 2022-07-15 RX ORDER — TRAMADOL HYDROCHLORIDE 50 MG/1
50 TABLET ORAL EVERY 12 HOURS PRN
Qty: 60 TABLET | Refills: 4 | OUTPATIENT
Start: 2022-07-15

## 2022-08-01 ENCOUNTER — OFFICE VISIT (OUTPATIENT)
Dept: PODIATRY | Facility: CLINIC | Age: 72
End: 2022-08-01
Payer: MEDICARE

## 2022-08-01 VITALS
WEIGHT: 293 LBS | DIASTOLIC BLOOD PRESSURE: 80 MMHG | HEIGHT: 72 IN | SYSTOLIC BLOOD PRESSURE: 146 MMHG | BODY MASS INDEX: 39.68 KG/M2 | HEART RATE: 77 BPM

## 2022-08-01 DIAGNOSIS — L97.512 SKIN ULCER OF TOE OF RIGHT FOOT WITH FAT LAYER EXPOSED: Primary | ICD-10-CM

## 2022-08-01 PROCEDURE — 1126F AMNT PAIN NOTED NONE PRSNT: CPT | Mod: CPTII,S$GLB,, | Performed by: PODIATRIST

## 2022-08-01 PROCEDURE — 1159F PR MEDICATION LIST DOCUMENTED IN MEDICAL RECORD: ICD-10-PCS | Mod: CPTII,S$GLB,, | Performed by: PODIATRIST

## 2022-08-01 PROCEDURE — 1159F MED LIST DOCD IN RCRD: CPT | Mod: CPTII,S$GLB,, | Performed by: PODIATRIST

## 2022-08-01 PROCEDURE — 99214 PR OFFICE/OUTPT VISIT, EST, LEVL IV, 30-39 MIN: ICD-10-PCS | Mod: 25,S$GLB,, | Performed by: PODIATRIST

## 2022-08-01 PROCEDURE — 3079F DIAST BP 80-89 MM HG: CPT | Mod: CPTII,S$GLB,, | Performed by: PODIATRIST

## 2022-08-01 PROCEDURE — 11042 DBRDMT SUBQ TIS 1ST 20SQCM/<: CPT | Mod: S$GLB,,, | Performed by: PODIATRIST

## 2022-08-01 PROCEDURE — 87075 CULTR BACTERIA EXCEPT BLOOD: CPT | Performed by: PODIATRIST

## 2022-08-01 PROCEDURE — 87186 SC STD MICRODIL/AGAR DIL: CPT | Performed by: PODIATRIST

## 2022-08-01 PROCEDURE — 3008F PR BODY MASS INDEX (BMI) DOCUMENTED: ICD-10-PCS | Mod: CPTII,S$GLB,, | Performed by: PODIATRIST

## 2022-08-01 PROCEDURE — 87070 CULTURE OTHR SPECIMN AEROBIC: CPT | Performed by: PODIATRIST

## 2022-08-01 PROCEDURE — 4010F ACE/ARB THERAPY RXD/TAKEN: CPT | Mod: CPTII,S$GLB,, | Performed by: PODIATRIST

## 2022-08-01 PROCEDURE — 11042 PR DEBRIDEMENT, SKIN, SUB-Q TISSUE,=<20 SQ CM: ICD-10-PCS | Mod: S$GLB,,, | Performed by: PODIATRIST

## 2022-08-01 PROCEDURE — 3077F SYST BP >= 140 MM HG: CPT | Mod: CPTII,S$GLB,, | Performed by: PODIATRIST

## 2022-08-01 PROCEDURE — 3077F PR MOST RECENT SYSTOLIC BLOOD PRESSURE >= 140 MM HG: ICD-10-PCS | Mod: CPTII,S$GLB,, | Performed by: PODIATRIST

## 2022-08-01 PROCEDURE — 99214 OFFICE O/P EST MOD 30 MIN: CPT | Mod: 25,S$GLB,, | Performed by: PODIATRIST

## 2022-08-01 PROCEDURE — 4010F PR ACE/ARB THEARPY RXD/TAKEN: ICD-10-PCS | Mod: CPTII,S$GLB,, | Performed by: PODIATRIST

## 2022-08-01 PROCEDURE — 3008F BODY MASS INDEX DOCD: CPT | Mod: CPTII,S$GLB,, | Performed by: PODIATRIST

## 2022-08-01 PROCEDURE — 87077 CULTURE AEROBIC IDENTIFY: CPT | Performed by: PODIATRIST

## 2022-08-01 PROCEDURE — 1126F PR PAIN SEVERITY QUANTIFIED, NO PAIN PRESENT: ICD-10-PCS | Mod: CPTII,S$GLB,, | Performed by: PODIATRIST

## 2022-08-01 PROCEDURE — 99999 PR PBB SHADOW E&M-EST. PATIENT-LVL IV: ICD-10-PCS | Mod: PBBFAC,,, | Performed by: PODIATRIST

## 2022-08-01 PROCEDURE — 99999 PR PBB SHADOW E&M-EST. PATIENT-LVL IV: CPT | Mod: PBBFAC,,, | Performed by: PODIATRIST

## 2022-08-01 PROCEDURE — 3079F PR MOST RECENT DIASTOLIC BLOOD PRESSURE 80-89 MM HG: ICD-10-PCS | Mod: CPTII,S$GLB,, | Performed by: PODIATRIST

## 2022-08-01 NOTE — PROGRESS NOTES
"Subjective:      Patient ID: Ana Patel is a 72 y.o. female.    Chief Complaint: Nail Problem (Right big toe nail )    Pt presents today c/o a loose toenail and a "callus" that she peeled off and it started bleeding. Pt has swelling in legs and has tubi- on right leg. Pt states that the right big toenail has been painful. Pt states he legs are red bc her "eczema" is spreading.     Review of Systems   Constitutional: Negative for chills, fever and malaise/fatigue.   HENT: Negative for hearing loss.    Cardiovascular: Positive for leg swelling. Negative for claudication.   Respiratory: Negative for shortness of breath.    Skin: Positive for color change and nail changes. Negative for flushing and rash.   Musculoskeletal: Negative for joint pain and myalgias.   Neurological: Positive for sensory change. Negative for loss of balance, numbness and paresthesias.   Psychiatric/Behavioral: Negative for altered mental status.           Objective:      Physical Exam  Vitals reviewed.   Cardiovascular:      Pulses: Decreased pulses.           Dorsalis pedis pulses are 1+ on the right side.        Posterior tibial pulses are 1+ on the right side.      Comments: Edema noted to digits on right foot as well  Musculoskeletal:      Right lower leg: Edema present.      Comments:        Feet:      Right foot:      Protective Sensation: 5 sites tested. 5 sites sensed.      Skin integrity: Ulcer, blister and skin breakdown present.      Toenail Condition: Right toenails are abnormally thick.      Left foot:      Protective Sensation: 5 sites tested. 5 sites sensed.   Skin:     Capillary Refill: Capillary refill takes more than 3 seconds.      Comments: Right hallux nail thick and loose  Nail bed soft and macerated    Ulceration  Location: right distal hallux  Measurements: 0.5x 0.5x 0.2cm  Drainage: serous  Wound base: fibrotic  SOI: erythema  cultured       Neurological:      Mental Status: She is alert and oriented to " person, place, and time.      Comments: Gross sensation intact b/L               Assessment:       Encounter Diagnosis   Name Primary?    Skin ulcer of toe of right foot with fat layer exposed Yes         Plan:       Ana was seen today for nail problem.    Diagnoses and all orders for this visit:    Skin ulcer of toe of right foot with fat layer exposed  -     Aerobic culture  -     Culture, Anaerobic      I counseled the patient on her conditions, their implications and medical management.      Toe wound cultured  Loose portion of hallux nail removed without incident, nail bed soft and macerated    Ulceration on distal right hallux debrided through sub-q tissue using an tissue nipper. Ulceration debrided down to healthy tissue. Pt tolerated debridement well.     Toe cleansed with betadine  DSD applied, pt advised to keep dry  Will send HH orders to agency that's already seeing pt  RTC in 1 week or sooner if any new pedal problems arise, any signs of infection occur, or if condition worsens.     .

## 2022-08-03 LAB — BACTERIA SPEC AEROBE CULT: ABNORMAL

## 2022-08-05 LAB — BACTERIA SPEC ANAEROBE CULT: NORMAL

## 2022-08-08 ENCOUNTER — OFFICE VISIT (OUTPATIENT)
Dept: PODIATRY | Facility: CLINIC | Age: 72
End: 2022-08-08
Payer: MEDICARE

## 2022-08-08 VITALS — HEART RATE: 58 BPM | SYSTOLIC BLOOD PRESSURE: 140 MMHG | DIASTOLIC BLOOD PRESSURE: 58 MMHG

## 2022-08-08 DIAGNOSIS — L97.512 SKIN ULCER OF TOE OF RIGHT FOOT WITH FAT LAYER EXPOSED: Primary | ICD-10-CM

## 2022-08-08 PROCEDURE — 99499 NO LOS: ICD-10-PCS | Mod: S$GLB,,, | Performed by: PODIATRIST

## 2022-08-08 PROCEDURE — 99499 UNLISTED E&M SERVICE: CPT | Mod: S$GLB,,, | Performed by: PODIATRIST

## 2022-08-08 PROCEDURE — 99999 PR PBB SHADOW E&M-EST. PATIENT-LVL I: ICD-10-PCS | Mod: PBBFAC,,, | Performed by: PODIATRIST

## 2022-08-08 PROCEDURE — 3077F PR MOST RECENT SYSTOLIC BLOOD PRESSURE >= 140 MM HG: ICD-10-PCS | Mod: CPTII,S$GLB,, | Performed by: PODIATRIST

## 2022-08-08 PROCEDURE — 99999 PR PBB SHADOW E&M-EST. PATIENT-LVL I: CPT | Mod: PBBFAC,,, | Performed by: PODIATRIST

## 2022-08-08 PROCEDURE — 11042 DBRDMT SUBQ TIS 1ST 20SQCM/<: CPT | Mod: S$GLB,,, | Performed by: PODIATRIST

## 2022-08-08 PROCEDURE — 3078F DIAST BP <80 MM HG: CPT | Mod: CPTII,S$GLB,, | Performed by: PODIATRIST

## 2022-08-08 PROCEDURE — 11042 PR DEBRIDEMENT, SKIN, SUB-Q TISSUE,=<20 SQ CM: ICD-10-PCS | Mod: S$GLB,,, | Performed by: PODIATRIST

## 2022-08-08 PROCEDURE — 4010F PR ACE/ARB THEARPY RXD/TAKEN: ICD-10-PCS | Mod: CPTII,S$GLB,, | Performed by: PODIATRIST

## 2022-08-08 PROCEDURE — 4010F ACE/ARB THERAPY RXD/TAKEN: CPT | Mod: CPTII,S$GLB,, | Performed by: PODIATRIST

## 2022-08-08 PROCEDURE — 3077F SYST BP >= 140 MM HG: CPT | Mod: CPTII,S$GLB,, | Performed by: PODIATRIST

## 2022-08-08 PROCEDURE — 3078F PR MOST RECENT DIASTOLIC BLOOD PRESSURE < 80 MM HG: ICD-10-PCS | Mod: CPTII,S$GLB,, | Performed by: PODIATRIST

## 2022-08-08 RX ORDER — MINOCYCLINE HYDROCHLORIDE 100 MG/1
100 CAPSULE ORAL 2 TIMES DAILY
Qty: 20 CAPSULE | Refills: 0 | Status: SHIPPED | OUTPATIENT
Start: 2022-08-08 | End: 2023-11-03

## 2022-08-08 NOTE — PROGRESS NOTES
Subjective:      Patient ID: Ana Patel is a 72 y.o. female.    Chief Complaint: No chief complaint on file.    Pt presents today for ulceration on right hallux. Pt states nursing changed bandage. Pt denies any new issues.     Review of Systems   Constitutional: Negative for chills, fever and malaise/fatigue.   HENT: Negative for hearing loss.    Cardiovascular: Positive for leg swelling. Negative for claudication.   Respiratory: Negative for shortness of breath.    Skin: Positive for color change and nail changes. Negative for flushing and rash.   Musculoskeletal: Negative for joint pain and myalgias.   Neurological: Positive for sensory change. Negative for loss of balance, numbness and paresthesias.   Psychiatric/Behavioral: Negative for altered mental status.           Objective:      Physical Exam  Vitals reviewed.   Cardiovascular:      Pulses: Decreased pulses.           Dorsalis pedis pulses are 1+ on the right side.        Posterior tibial pulses are 1+ on the right side.      Comments: Edema noted to digits on right foot as well  Musculoskeletal:      Right lower leg: Edema present.      Comments:        Feet:      Right foot:      Protective Sensation: 5 sites tested. 5 sites sensed.      Skin integrity: Ulcer, blister and skin breakdown present.      Toenail Condition: Right toenails are abnormally thick.      Left foot:      Protective Sensation: 5 sites tested. 5 sites sensed.   Skin:     Capillary Refill: Capillary refill takes more than 3 seconds.      Comments: Right hallux nail bed dry      Ulceration  Location: right distal hallux  Measurements: 0.5x 0.5x 0.2cm  Drainage: serous  Wound base: fibrotic  SOI: erythema, improved       Neurological:      Mental Status: She is alert and oriented to person, place, and time.      Comments: Gross sensation intact b/L               Assessment:       Encounter Diagnosis   Name Primary?    Skin ulcer of toe of right foot with fat layer exposed Yes          Plan:       Diagnoses and all orders for this visit:    Skin ulcer of toe of right foot with fat layer exposed    Other orders  -     minocycline (MINOCIN,DYNACIN) 100 MG capsule; Take 1 capsule (100 mg total) by mouth 2 (two) times daily.      I counseled the patient on her conditions, their implications and medical management.      rx minocycline  Ulceration on distal right hallux debrided through sub-q tissue using an tissue nipper. Ulceration debrided down to healthy tissue. Pt tolerated debridement well.     Toe cleansed with betadine  DSD applied, pt advised to keep dry  RTC in 2 weeks or sooner if any new pedal problems arise, any signs of infection occur, or if condition worsens.     .

## 2022-08-16 ENCOUNTER — PATIENT MESSAGE (OUTPATIENT)
Dept: INTERNAL MEDICINE | Facility: CLINIC | Age: 72
End: 2022-08-16
Payer: MEDICARE

## 2022-08-16 DIAGNOSIS — I73.9 PAD (PERIPHERAL ARTERY DISEASE): ICD-10-CM

## 2022-08-16 DIAGNOSIS — M19.90 OSTEOARTHRITIS, UNSPECIFIED OSTEOARTHRITIS TYPE, UNSPECIFIED SITE: ICD-10-CM

## 2022-08-16 RX ORDER — HYDROCODONE BITARTRATE AND ACETAMINOPHEN 10; 325 MG/1; MG/1
1 TABLET ORAL EVERY 12 HOURS PRN
Qty: 60 TABLET | Refills: 0 | Status: SHIPPED | OUTPATIENT
Start: 2022-08-16 | End: 2022-09-21 | Stop reason: SDUPTHER

## 2022-08-17 ENCOUNTER — PATIENT MESSAGE (OUTPATIENT)
Dept: INTERNAL MEDICINE | Facility: CLINIC | Age: 72
End: 2022-08-17
Payer: MEDICARE

## 2022-08-24 ENCOUNTER — PATIENT MESSAGE (OUTPATIENT)
Dept: ADMINISTRATIVE | Facility: HOSPITAL | Age: 72
End: 2022-08-24
Payer: MEDICARE

## 2022-09-15 ENCOUNTER — PATIENT OUTREACH (OUTPATIENT)
Dept: ADMINISTRATIVE | Facility: HOSPITAL | Age: 72
End: 2022-09-15
Payer: MEDICARE

## 2022-09-21 ENCOUNTER — PATIENT MESSAGE (OUTPATIENT)
Dept: INTERNAL MEDICINE | Facility: CLINIC | Age: 72
End: 2022-09-21
Payer: MEDICARE

## 2022-09-21 DIAGNOSIS — I73.9 PAD (PERIPHERAL ARTERY DISEASE): ICD-10-CM

## 2022-09-21 DIAGNOSIS — M19.90 OSTEOARTHRITIS, UNSPECIFIED OSTEOARTHRITIS TYPE, UNSPECIFIED SITE: ICD-10-CM

## 2022-09-21 NOTE — TELEPHONE ENCOUNTER
Care Due:                  Date            Visit Type   Department     Provider  --------------------------------------------------------------------------------                                EP -                              PRIMARY      [facility]  Last Visit: 06-      CARE ([facility])   MEDICINE       Gordo Naik                               -                              PRIMARY      [facility]  Next Visit: 10-      CARE ([facility])   MEDICINE       Gordo Naik                                                            Last  Test          Frequency    Reason                     Performed    Due Date  --------------------------------------------------------------------------------    Lipid Panel.  12 months..  ezetimibe, rosuvastatin..  11-   11-    [facility] Embedded Care Gaps. Reference number: [id number]. 9/21/2022   3:48:51 PM CDT

## 2022-09-22 RX ORDER — HYDROCODONE BITARTRATE AND ACETAMINOPHEN 10; 325 MG/1; MG/1
1 TABLET ORAL EVERY 12 HOURS PRN
Qty: 60 TABLET | Refills: 0 | Status: SHIPPED | OUTPATIENT
Start: 2022-09-22 | End: 2022-11-28 | Stop reason: SDUPTHER

## 2022-10-10 ENCOUNTER — PATIENT MESSAGE (OUTPATIENT)
Dept: ADMINISTRATIVE | Facility: HOSPITAL | Age: 72
End: 2022-10-10
Payer: MEDICARE

## 2022-10-17 ENCOUNTER — TELEPHONE (OUTPATIENT)
Dept: INTERNAL MEDICINE | Facility: CLINIC | Age: 72
End: 2022-10-17
Payer: MEDICARE

## 2022-10-17 ENCOUNTER — PATIENT MESSAGE (OUTPATIENT)
Dept: INTERNAL MEDICINE | Facility: CLINIC | Age: 72
End: 2022-10-17
Payer: MEDICARE

## 2022-10-17 ENCOUNTER — OFFICE VISIT (OUTPATIENT)
Dept: INTERNAL MEDICINE | Facility: CLINIC | Age: 72
End: 2022-10-17
Payer: MEDICARE

## 2022-10-17 VITALS
BODY MASS INDEX: 45.93 KG/M2 | OXYGEN SATURATION: 94 % | DIASTOLIC BLOOD PRESSURE: 70 MMHG | RESPIRATION RATE: 18 BRPM | HEART RATE: 67 BPM | SYSTOLIC BLOOD PRESSURE: 122 MMHG | HEIGHT: 72 IN

## 2022-10-17 DIAGNOSIS — M19.90 OSTEOARTHRITIS, UNSPECIFIED OSTEOARTHRITIS TYPE, UNSPECIFIED SITE: ICD-10-CM

## 2022-10-17 DIAGNOSIS — I73.9 PAD (PERIPHERAL ARTERY DISEASE): ICD-10-CM

## 2022-10-17 DIAGNOSIS — Z12.31 ENCOUNTER FOR SCREENING MAMMOGRAM FOR MALIGNANT NEOPLASM OF BREAST: Primary | ICD-10-CM

## 2022-10-17 LAB
AMPHET+METHAMPHET UR QL: NEGATIVE
BARBITURATES UR QL SCN>200 NG/ML: NEGATIVE
BENZODIAZ UR QL SCN>200 NG/ML: NEGATIVE
BZE UR QL SCN: NEGATIVE
CANNABINOIDS UR QL SCN: ABNORMAL
CREAT UR-MCNC: 32 MG/DL (ref 15–325)
ETHANOL UR-MCNC: <10 MG/DL
METHADONE UR QL SCN>300 NG/ML: NEGATIVE
OPIATES UR QL SCN: ABNORMAL
PCP UR QL SCN>25 NG/ML: NEGATIVE
TOXICOLOGY INFORMATION: ABNORMAL

## 2022-10-17 PROCEDURE — G0008 FLU VACCINE - QUADRIVALENT - ADJUVANTED: ICD-10-PCS | Mod: S$GLB,,, | Performed by: INTERNAL MEDICINE

## 2022-10-17 PROCEDURE — 3288F FALL RISK ASSESSMENT DOCD: CPT | Mod: CPTII,S$GLB,, | Performed by: INTERNAL MEDICINE

## 2022-10-17 PROCEDURE — 99214 OFFICE O/P EST MOD 30 MIN: CPT | Mod: S$GLB,,, | Performed by: INTERNAL MEDICINE

## 2022-10-17 PROCEDURE — 3078F PR MOST RECENT DIASTOLIC BLOOD PRESSURE < 80 MM HG: ICD-10-PCS | Mod: CPTII,S$GLB,, | Performed by: INTERNAL MEDICINE

## 2022-10-17 PROCEDURE — 3288F PR FALLS RISK ASSESSMENT DOCUMENTED: ICD-10-PCS | Mod: CPTII,S$GLB,, | Performed by: INTERNAL MEDICINE

## 2022-10-17 PROCEDURE — G0008 ADMIN INFLUENZA VIRUS VAC: HCPCS | Mod: S$GLB,,, | Performed by: INTERNAL MEDICINE

## 2022-10-17 PROCEDURE — 99999 PR PBB SHADOW E&M-EST. PATIENT-LVL V: CPT | Mod: PBBFAC,,, | Performed by: INTERNAL MEDICINE

## 2022-10-17 PROCEDURE — 3078F DIAST BP <80 MM HG: CPT | Mod: CPTII,S$GLB,, | Performed by: INTERNAL MEDICINE

## 2022-10-17 PROCEDURE — 1101F PR PT FALLS ASSESS DOC 0-1 FALLS W/OUT INJ PAST YR: ICD-10-PCS | Mod: CPTII,S$GLB,, | Performed by: INTERNAL MEDICINE

## 2022-10-17 PROCEDURE — 80326 AMPHETAMINES 5 OR MORE: CPT | Performed by: INTERNAL MEDICINE

## 2022-10-17 PROCEDURE — 99999 PR PBB SHADOW E&M-EST. PATIENT-LVL V: ICD-10-PCS | Mod: PBBFAC,,, | Performed by: INTERNAL MEDICINE

## 2022-10-17 PROCEDURE — 90694 VACC AIIV4 NO PRSRV 0.5ML IM: CPT | Mod: S$GLB,,, | Performed by: INTERNAL MEDICINE

## 2022-10-17 PROCEDURE — 80307 DRUG TEST PRSMV CHEM ANLYZR: CPT | Performed by: INTERNAL MEDICINE

## 2022-10-17 PROCEDURE — 4010F PR ACE/ARB THEARPY RXD/TAKEN: ICD-10-PCS | Mod: CPTII,S$GLB,, | Performed by: INTERNAL MEDICINE

## 2022-10-17 PROCEDURE — 4010F ACE/ARB THERAPY RXD/TAKEN: CPT | Mod: CPTII,S$GLB,, | Performed by: INTERNAL MEDICINE

## 2022-10-17 PROCEDURE — 1159F MED LIST DOCD IN RCRD: CPT | Mod: CPTII,S$GLB,, | Performed by: INTERNAL MEDICINE

## 2022-10-17 PROCEDURE — 1160F RVW MEDS BY RX/DR IN RCRD: CPT | Mod: CPTII,S$GLB,, | Performed by: INTERNAL MEDICINE

## 2022-10-17 PROCEDURE — 1126F PR PAIN SEVERITY QUANTIFIED, NO PAIN PRESENT: ICD-10-PCS | Mod: CPTII,S$GLB,, | Performed by: INTERNAL MEDICINE

## 2022-10-17 PROCEDURE — 1159F PR MEDICATION LIST DOCUMENTED IN MEDICAL RECORD: ICD-10-PCS | Mod: CPTII,S$GLB,, | Performed by: INTERNAL MEDICINE

## 2022-10-17 PROCEDURE — 3074F SYST BP LT 130 MM HG: CPT | Mod: CPTII,S$GLB,, | Performed by: INTERNAL MEDICINE

## 2022-10-17 PROCEDURE — 1160F PR REVIEW ALL MEDS BY PRESCRIBER/CLIN PHARMACIST DOCUMENTED: ICD-10-PCS | Mod: CPTII,S$GLB,, | Performed by: INTERNAL MEDICINE

## 2022-10-17 PROCEDURE — 99214 PR OFFICE/OUTPT VISIT, EST, LEVL IV, 30-39 MIN: ICD-10-PCS | Mod: S$GLB,,, | Performed by: INTERNAL MEDICINE

## 2022-10-17 PROCEDURE — 1126F AMNT PAIN NOTED NONE PRSNT: CPT | Mod: CPTII,S$GLB,, | Performed by: INTERNAL MEDICINE

## 2022-10-17 PROCEDURE — 1101F PT FALLS ASSESS-DOCD LE1/YR: CPT | Mod: CPTII,S$GLB,, | Performed by: INTERNAL MEDICINE

## 2022-10-17 PROCEDURE — 90694 FLU VACCINE - QUADRIVALENT - ADJUVANTED: ICD-10-PCS | Mod: S$GLB,,, | Performed by: INTERNAL MEDICINE

## 2022-10-17 PROCEDURE — 3074F PR MOST RECENT SYSTOLIC BLOOD PRESSURE < 130 MM HG: ICD-10-PCS | Mod: CPTII,S$GLB,, | Performed by: INTERNAL MEDICINE

## 2022-10-17 RX ORDER — TRAMADOL HYDROCHLORIDE 50 MG/1
50 TABLET ORAL EVERY 12 HOURS PRN
Qty: 60 TABLET | Refills: 4 | Status: SHIPPED | OUTPATIENT
Start: 2022-10-17 | End: 2023-01-17 | Stop reason: SDUPTHER

## 2022-10-17 NOTE — PROGRESS NOTES
Subjective:       Patient ID: Ana Patel is a 72 y.o. female.    Chief Complaint: Follow-up    Patient is here for followup for chronic conditions:    The patient had a right great toe infection which grew staph aureus now s/p antibiotics. She reports continued tobacco use but decreased to less than half a pack per day. She will be seeing a vascular specialist at  for consideration of stenting for PVD.    Still has pain RLE wound when nurse does wound cleaning for her necrobiosis lipodica. She receives home health wound-care three times per month with overall improvement in the wound.  She uses oxycodone for pain after wound cleanings and uses Tramadol for chronic OA pains.    Blood pressure: well controlled on current regimen of lisinopril and amlodipine.    No other new health issues that she would like to discuss today.    In terms of health maintenance the patient would like to receive her COVID booster and flu vaccine today. She had a mammogram ordered earlier this year but had never completed it. Plan to do mammo now that her lower extremity wound is less active.    Review of Systems   Constitutional:  Negative for activity change, appetite change and unexpected weight change.   HENT:  Negative for sinus pressure and sore throat.    Eyes:  Negative for visual disturbance.   Respiratory:  Negative for cough, chest tightness and shortness of breath.    Cardiovascular:  Positive for leg swelling (chronic; stable). Negative for chest pain.   Gastrointestinal:  Negative for abdominal distention and abdominal pain.   Genitourinary:  Negative for difficulty urinating and urgency.   Musculoskeletal:  Positive for arthralgias. Negative for joint swelling.   Skin:  Positive for rash (necrobiosis right leg and eczema) and wound (RLE w/ dressing clean, dry, and intact).   Neurological:  Negative for tremors, syncope and weakness.           Objective:      Physical Exam  Vitals reviewed.   Constitutional:        General: She is not in acute distress.     Appearance: Normal appearance. She is well-developed. She is obese. She is not ill-appearing, toxic-appearing or diaphoretic.      Comments: Sitting in wheelchair   HENT:      Head: Normocephalic and atraumatic.   Eyes:      General: No scleral icterus.     Extraocular Movements: Extraocular movements intact.      Conjunctiva/sclera: Conjunctivae normal.   Neck:      Thyroid: No thyromegaly.   Cardiovascular:      Rate and Rhythm: Normal rate and regular rhythm.      Heart sounds: Normal heart sounds. No murmur heard.    No friction rub. No gallop.      Comments: Decreased pulses bilateral lower extremities  Pulmonary:      Effort: Pulmonary effort is normal. No respiratory distress.      Breath sounds: Normal breath sounds. No wheezing or rales.   Abdominal:      General: Bowel sounds are normal. There is no distension.      Palpations: Abdomen is soft. There is no mass.      Tenderness: There is no abdominal tenderness. There is no guarding or rebound.   Musculoskeletal:         General: Signs of injury (RLE extremity wrapped, clean, and dry. Distal right great toe w/ healing wound, non-erythematous or purulent.) present. Normal range of motion.      Cervical back: Normal range of motion.      Right lower leg: Edema present.      Left lower leg: Edema present.   Lymphadenopathy:      Cervical: No cervical adenopathy.   Skin:     Capillary Refill: Capillary refill takes more than 3 seconds.      Findings: Erythema (right lower extremity) present.      Comments: RLE lower leg in ace wrap, sores not seen today   Neurological:      Mental Status: She is alert and oriented to person, place, and time.   Psychiatric:         Speech: Speech normal.         Behavior: Behavior normal.       Assessment:       1. Encounter for screening mammogram for malignant neoplasm of breast    2. PAD (peripheral artery disease)    3. Osteoarthritis, unspecified osteoarthritis type, unspecified  site        Plan:       Ana was seen today for follow-up.    Diagnoses and all orders for this visit:    Venous stasis ulcers of both lower extremities  Necrobiosis lipoidica   Osteoarthritis, unspecified osteoarthritis type, unspecified site  Following with home health wound care and vascular surgery at . Slow improvement in right lower extremity wound w/ topical treatment. Plan to continue home health three times weekly w/PRN norco as needed for post-debridement pain.   - Continue home health wound care   - Continue topical collagenase    PAD (peripheral artery disease)  Decreased pedal pulses. Patient reports continued tobacco use but decreased to less than half a pack per day. Encouraged cessation for prevention of worsening PVD.   - Continue follow-up with vascular surgery at     Health Maintenance   - Flu vaccine   - Covid booster (5th dose)   - FOBT at home (patient has had it at home but has not completed the test)   - Mammography    Health Maintenance         Date Due Completion Date    Diabetes Urine Screening Never done ---    Foot Exam Never done ---    Sign Pain Contract Never done ---    Complete Opioid Risk Tool Never done ---    Eye Exam 09/28/2018 9/28/2017    Colorectal Cancer Screening 05/28/2019 5/28/2018    Mammogram 09/03/2020 9/3/2019    Shingles Vaccine (3 of 3) 11/20/2020 9/25/2020    COVID-19 Vaccine (4 - Booster for Moderna series) 03/15/2022 11/15/2021    Hemoglobin A1c 05/10/2022 11/10/2021    Lipid Panel 11/10/2022 11/10/2021    Low Dose Statin 06/17/2023 6/17/2022    TETANUS VACCINE 11/10/2031 11/10/2021    Override on 8/24/2017: Declined        She does not have dm2.   checked prior to refilling opioids    Follow up in about 6 months (around 4/17/2023).    Future Appointments   Date Time Provider Department Center   10/19/2022  2:00 PM Caridad Diaz NP [facility] IM [facility] PCW   10/19/2022  4:00 PM Sarkis Alvarez MD [facility] RHEUM [facility]   2/17/2023  1:20 PM Gordo SILVERIO  MD [facility] DO[facility]          I have reviewed and concur with the resident's history, physical, assessment, and plan.  I have personally interviewed and examined the patient at bedside.  See below addendum for my evaluation and additional findings.

## 2022-10-17 NOTE — PATIENT INSTRUCTIONS
Health Maintenance Due   Topic Date Due                   COVID-19 Vaccine (5 - Booster for Moderna series) 08/17/2022    Influenza Vaccine (1) 09/01/2022

## 2022-10-17 NOTE — TELEPHONE ENCOUNTER
----- Message from Laurie Gonzalez sent at 10/17/2022 11:18 AM CDT -----  Regarding: advice  Contact: [facility] [phone]  Labs were faxed over. Please confirm if they were received  Please call dn advise

## 2022-10-17 NOTE — TELEPHONE ENCOUNTER
Called HH spoke to Elvie she is going to fax the most current labs as well as most recent for comparison- Rosalba MARRUFO MA

## 2022-10-17 NOTE — PROGRESS NOTES
Patient administered FLU HD >65 IM left deltoid. Two patient identifiers (name & ) confirmed. Patient advised 15 min wait time. Patient tolerated vaccination well.- Rosalba MARRUFO MA

## 2022-10-19 ENCOUNTER — OFFICE VISIT (OUTPATIENT)
Dept: RHEUMATOLOGY | Facility: CLINIC | Age: 72
End: 2022-10-19
Payer: MEDICARE

## 2022-10-19 ENCOUNTER — PATIENT OUTREACH (OUTPATIENT)
Dept: ADMINISTRATIVE | Facility: HOSPITAL | Age: 72
End: 2022-10-19
Payer: MEDICARE

## 2022-10-19 VITALS
HEART RATE: 72 BPM | WEIGHT: 293 LBS | BODY MASS INDEX: 39.68 KG/M2 | DIASTOLIC BLOOD PRESSURE: 46 MMHG | SYSTOLIC BLOOD PRESSURE: 110 MMHG | HEIGHT: 72 IN

## 2022-10-19 DIAGNOSIS — I73.9 PAD (PERIPHERAL ARTERY DISEASE): ICD-10-CM

## 2022-10-19 DIAGNOSIS — L92.1 NECROBIOSIS LIPOIDICA: Primary | ICD-10-CM

## 2022-10-19 DIAGNOSIS — E66.01 MORBID OBESITY: ICD-10-CM

## 2022-10-19 PROCEDURE — 3074F SYST BP LT 130 MM HG: CPT | Mod: CPTII,S$GLB,, | Performed by: INTERNAL MEDICINE

## 2022-10-19 PROCEDURE — 3078F DIAST BP <80 MM HG: CPT | Mod: CPTII,S$GLB,, | Performed by: INTERNAL MEDICINE

## 2022-10-19 PROCEDURE — 99999 PR PBB SHADOW E&M-EST. PATIENT-LVL IV: CPT | Mod: PBBFAC,,, | Performed by: INTERNAL MEDICINE

## 2022-10-19 PROCEDURE — 99499 UNLISTED E&M SERVICE: CPT | Mod: HCNC,S$GLB,, | Performed by: INTERNAL MEDICINE

## 2022-10-19 PROCEDURE — 4010F ACE/ARB THERAPY RXD/TAKEN: CPT | Mod: CPTII,S$GLB,, | Performed by: INTERNAL MEDICINE

## 2022-10-19 PROCEDURE — 99213 PR OFFICE/OUTPT VISIT, EST, LEVL III, 20-29 MIN: ICD-10-PCS | Mod: S$GLB,,, | Performed by: INTERNAL MEDICINE

## 2022-10-19 PROCEDURE — 99213 OFFICE O/P EST LOW 20 MIN: CPT | Mod: S$GLB,,, | Performed by: INTERNAL MEDICINE

## 2022-10-19 PROCEDURE — 99999 PR PBB SHADOW E&M-EST. PATIENT-LVL IV: ICD-10-PCS | Mod: PBBFAC,,, | Performed by: INTERNAL MEDICINE

## 2022-10-19 PROCEDURE — 3074F PR MOST RECENT SYSTOLIC BLOOD PRESSURE < 130 MM HG: ICD-10-PCS | Mod: CPTII,S$GLB,, | Performed by: INTERNAL MEDICINE

## 2022-10-19 PROCEDURE — 3078F PR MOST RECENT DIASTOLIC BLOOD PRESSURE < 80 MM HG: ICD-10-PCS | Mod: CPTII,S$GLB,, | Performed by: INTERNAL MEDICINE

## 2022-10-19 PROCEDURE — 4010F PR ACE/ARB THEARPY RXD/TAKEN: ICD-10-PCS | Mod: CPTII,S$GLB,, | Performed by: INTERNAL MEDICINE

## 2022-10-19 PROCEDURE — 1125F AMNT PAIN NOTED PAIN PRSNT: CPT | Mod: CPTII,S$GLB,, | Performed by: INTERNAL MEDICINE

## 2022-10-19 PROCEDURE — 1125F PR PAIN SEVERITY QUANTIFIED, PAIN PRESENT: ICD-10-PCS | Mod: CPTII,S$GLB,, | Performed by: INTERNAL MEDICINE

## 2022-10-19 PROCEDURE — 99499 RISK ADDL DX/OHS AUDIT: ICD-10-PCS | Mod: HCNC,S$GLB,, | Performed by: INTERNAL MEDICINE

## 2022-10-19 RX ORDER — FUROSEMIDE 20 MG/1
40 TABLET ORAL DAILY
COMMUNITY
Start: 2022-05-07 | End: 2023-03-27 | Stop reason: SDUPTHER

## 2022-10-19 ASSESSMENT — ROUTINE ASSESSMENT OF PATIENT INDEX DATA (RAPID3)
PSYCHOLOGICAL DISTRESS SCORE: 3.3
PAIN SCORE: 9
MDHAQ FUNCTION SCORE: 1.1
FATIGUE SCORE: 8
TOTAL RAPID3 SCORE: 7.05
PATIENT GLOBAL ASSESSMENT SCORE: 8.5
AM STIFFNESS SCORE: 1, YES

## 2022-10-19 NOTE — PROGRESS NOTES
Health Maintenance Due   Topic Date Due    Colorectal Cancer Screening  05/28/2019    Mammogram  09/03/2020    Shingles Vaccine (3 of 3) 11/20/2020    COVID-19 Vaccine (5 - Booster for Moderna series) 08/17/2022     Triggered LINKS. Updated Care Everywhere. Chart review completed as part of [employer] report.

## 2022-10-20 ENCOUNTER — PES CALL (OUTPATIENT)
Dept: ADMINISTRATIVE | Facility: CLINIC | Age: 72
End: 2022-10-20
Payer: MEDICARE

## 2022-10-21 NOTE — PROGRESS NOTES
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  She remembers it in the lower extremities.  She had trouble walking.  It may have lasted 6 months.  She was treated with unknown medication.  She has had no recurrence of the problem.     She has had a rash on her leg since she was 18 years old.  She had a previous biopsy which revealed necrobiosis lipoidica.  She has also been clinically the diagnosed as having lipodermatosclerosis.  She developed ulcers on her right leg in December.  She has been going to Wound Care.  She had had at least 1 or 2 prior episodes of ulcers on her legs.  She had been treated with steroids in the past for the ulcers.  She had only been on topical medications for the rash.  She has been going to wound care.  She was seen by vascular surgery.  She was diagnosed as having pyoderma gangrenosum.  She was placed on high-dose prednisone.     I saw her for the 1st time in [facility].  Clinically she had no evidence to suggest an underlying connective tissue disease.  She does have osteoarthritis.  I saw her again in March.  She was off prednisone.  Her legs were less erythematous.  I placed her on Plaquenil 400 mg daily.    She  was hospitalized since her last visit for infection legs.  She is now getting home health who is change her dressing 3 times a week.  Her legs are getting much better.  She had an ulcer on her toe but this is healing as well.  She continues to take Plaquenil.  She thinks her legs are doing better since she is been on the Plaquenil.  She is had no other recent medical problems.      Physical examination was not performed, the entire time was counseling.     Assessment:  NLD    Plans:   Continue Plaquenil as before  Return in 6 months

## 2022-10-22 LAB
6MAM UR QL: NOT DETECTED
7AMINOCLONAZEPAM UR QL: NOT DETECTED
A-OH ALPRAZ UR QL: NOT DETECTED
ALPHA-OH-MIDAZOLAM: NOT DETECTED
ALPRAZ UR QL: NOT DETECTED
AMPHET UR QL SCN: NOT DETECTED
ANNOTATION COMMENT IMP: NORMAL
ANNOTATION COMMENT IMP: NORMAL
BARBITURATES UR QL: NOT DETECTED
BUPRENORPHINE UR QL: NOT DETECTED
BZE UR QL: NOT DETECTED
CARBOXYTHC UR QL: PRESENT
CARISOPRODOL UR QL: NOT DETECTED
CLONAZEPAM UR QL: NOT DETECTED
CODEINE UR QL: NOT DETECTED
CREAT UR-MCNC: 32.6 MG/DL (ref 20–400)
DIAZEPAM UR QL: NOT DETECTED
ETHYL GLUCURONIDE UR QL: NOT DETECTED
FENTANYL UR QL: NOT DETECTED
GABAPENTIN: NOT DETECTED
HYDROCODONE UR QL: PRESENT
HYDROMORPHONE UR QL: PRESENT
LORAZEPAM UR QL: NOT DETECTED
MDA UR QL: NOT DETECTED
MDEA UR QL: NOT DETECTED
MDMA UR QL: NOT DETECTED
ME-PHENIDATE UR QL: NOT DETECTED
METHADONE UR QL: NOT DETECTED
METHAMPHET UR QL: NOT DETECTED
MIDAZOLAM UR QL SCN: NOT DETECTED
MORPHINE UR QL: NOT DETECTED
NALOXONE: NOT DETECTED
NORBUPRENORPHINE UR QL CFM: NOT DETECTED
NORDIAZEPAM UR QL: NOT DETECTED
NORFENTANYL UR QL: NOT DETECTED
NORHYDROCODONE UR QL CFM: PRESENT
NORMEPERIDINE UR QL CFM: NOT DETECTED
NOROXYCODONE UR QL CFM: NOT DETECTED
NOROXYMORPHONE UR QL SCN: NOT DETECTED
OXAZEPAM UR QL: NOT DETECTED
OXYCODONE UR QL: NOT DETECTED
OXYMORPHONE UR QL: NOT DETECTED
PATHOLOGY STUDY: NORMAL
PCP UR QL: NOT DETECTED
PHENTERMINE UR QL: NOT DETECTED
PREGABALIN: NOT DETECTED
SERVICE CMNT-IMP: NORMAL
TAPENTADOL UR QL SCN: NOT DETECTED
TAPENTADOL UR QL SCN: NOT DETECTED
TEMAZEPAM UR QL: NOT DETECTED
TRAMADOL UR QL: PRESENT
ZOLPIDEM METABOLITE: NOT DETECTED
ZOLPIDEM UR QL: NOT DETECTED

## 2022-10-26 ENCOUNTER — PATIENT MESSAGE (OUTPATIENT)
Dept: RESEARCH | Facility: HOSPITAL | Age: 72
End: 2022-10-26
Payer: MEDICARE

## 2022-10-27 ENCOUNTER — IMMUNIZATION (OUTPATIENT)
Dept: PHARMACY | Facility: CLINIC | Age: 72
End: 2022-10-27
Payer: MEDICARE

## 2022-10-27 DIAGNOSIS — Z23 NEED FOR VACCINATION: Primary | ICD-10-CM

## 2022-11-28 ENCOUNTER — PATIENT MESSAGE (OUTPATIENT)
Dept: INTERNAL MEDICINE | Facility: CLINIC | Age: 72
End: 2022-11-28
Payer: MEDICARE

## 2022-11-28 DIAGNOSIS — I73.9 PAD (PERIPHERAL ARTERY DISEASE): ICD-10-CM

## 2022-11-28 DIAGNOSIS — M19.90 OSTEOARTHRITIS, UNSPECIFIED OSTEOARTHRITIS TYPE, UNSPECIFIED SITE: ICD-10-CM

## 2022-11-28 RX ORDER — HYDROCODONE BITARTRATE AND ACETAMINOPHEN 10; 325 MG/1; MG/1
1 TABLET ORAL EVERY 12 HOURS PRN
Qty: 60 TABLET | Refills: 0 | Status: SHIPPED | OUTPATIENT
Start: 2022-11-28 | End: 2022-12-05 | Stop reason: SDUPTHER

## 2022-11-28 NOTE — TELEPHONE ENCOUNTER
Care Due:                  Date            Visit Type   Department     Provider  --------------------------------------------------------------------------------                                EP -                              PRIMARY      [facility]  Last Visit: 10-      CARE ([facility])   MEDICINE       Gordo Naik                               -                              PRIMARY      [facility]  Next Visit: 02-      CARE ([facility])   MEDICINE       Gordo Naik                                                            Last  Test          Frequency    Reason                     Performed    Due Date  --------------------------------------------------------------------------------    Lipid Panel.  12 months..  ezetimibe, rosuvastatin..  11-   11-    [facility] Embedded Care Gaps. Reference number: [id number]. 11/28/2022   9:54:10 AM CST

## 2022-12-05 DIAGNOSIS — I73.9 PAD (PERIPHERAL ARTERY DISEASE): ICD-10-CM

## 2022-12-05 DIAGNOSIS — M19.90 OSTEOARTHRITIS, UNSPECIFIED OSTEOARTHRITIS TYPE, UNSPECIFIED SITE: ICD-10-CM

## 2022-12-05 NOTE — TELEPHONE ENCOUNTER
No new care gaps identified.  [facility] Embedded Care Gaps. Reference number: [id number]. 12/05/2022   3:47:38 PM CST

## 2022-12-06 RX ORDER — HYDROCODONE BITARTRATE AND ACETAMINOPHEN 10; 325 MG/1; MG/1
1 TABLET ORAL EVERY 12 HOURS PRN
Qty: 60 TABLET | Refills: 0 | Status: SHIPPED | OUTPATIENT
Start: 2022-12-06 | End: 2023-02-10 | Stop reason: SDUPTHER

## 2022-12-08 ENCOUNTER — PES CALL (OUTPATIENT)
Dept: ADMINISTRATIVE | Facility: CLINIC | Age: 72
End: 2022-12-08
Payer: MEDICARE

## 2022-12-23 ENCOUNTER — HOSPITAL ENCOUNTER (OUTPATIENT)
Dept: RADIOLOGY | Facility: HOSPITAL | Age: 72
Discharge: HOME | End: 2022-12-23
Payer: MEDICARE

## 2023-01-16 ENCOUNTER — PATIENT MESSAGE (OUTPATIENT)
Dept: INTERNAL MEDICINE | Facility: CLINIC | Age: 73
End: 2023-01-16
Payer: MEDICARE

## 2023-01-16 DIAGNOSIS — M19.90 OSTEOARTHRITIS, UNSPECIFIED OSTEOARTHRITIS TYPE, UNSPECIFIED SITE: ICD-10-CM

## 2023-01-16 DIAGNOSIS — I73.9 PAD (PERIPHERAL ARTERY DISEASE): ICD-10-CM

## 2023-01-17 ENCOUNTER — HOSPITAL ENCOUNTER (OUTPATIENT)
Dept: RADIOLOGY | Facility: HOSPITAL | Age: 73
Discharge: HOME | End: 2023-01-17
Payer: MEDICARE

## 2023-01-17 VITALS — BODY MASS INDEX: 41.74 KG/M2 | HEIGHT: 72 IN

## 2023-01-17 DIAGNOSIS — Z12.31 ENCOUNTER FOR SCREENING MAMMOGRAM FOR MALIGNANT NEOPLASM OF BREAST: ICD-10-CM

## 2023-01-17 PROCEDURE — 77067 SCR MAMMO BI INCL CAD: CPT | Mod: TC,HCNC

## 2023-01-17 PROCEDURE — 77067 MAMMO DIGITAL SCREENING BILAT WITH TOMO: ICD-10-PCS | Mod: 26,HCNC,, | Performed by: RADIOLOGY

## 2023-01-17 PROCEDURE — 77063 MAMMO DIGITAL SCREENING BILAT WITH TOMO: ICD-10-PCS | Mod: 26,HCNC,, | Performed by: RADIOLOGY

## 2023-01-17 PROCEDURE — 77063 BREAST TOMOSYNTHESIS BI: CPT | Mod: 26,HCNC,, | Performed by: RADIOLOGY

## 2023-01-17 PROCEDURE — 77067 SCR MAMMO BI INCL CAD: CPT | Mod: 26,HCNC,, | Performed by: RADIOLOGY

## 2023-01-17 RX ORDER — TRAMADOL HYDROCHLORIDE 50 MG/1
50 TABLET ORAL EVERY 12 HOURS PRN
Qty: 60 TABLET | Refills: 4 | Status: SHIPPED | OUTPATIENT
Start: 2023-01-17 | End: 2023-04-24 | Stop reason: SDUPTHER

## 2023-01-17 NOTE — TELEPHONE ENCOUNTER
No new care gaps identified.  [facility] Embedded Care Gaps. Reference number: [id number]. 1/17/2023   10:32:26 AM CST

## 2023-01-19 ENCOUNTER — OFFICE VISIT (OUTPATIENT)
Dept: OPTOMETRY | Facility: CLINIC | Age: 73
End: 2023-01-19
Payer: MEDICARE

## 2023-01-19 DIAGNOSIS — Z13.5 GLAUCOMA SCREENING: ICD-10-CM

## 2023-01-19 DIAGNOSIS — H52.03 HYPEROPIA WITH PRESBYOPIA OF BOTH EYES: ICD-10-CM

## 2023-01-19 DIAGNOSIS — H52.4 HYPEROPIA WITH PRESBYOPIA OF BOTH EYES: ICD-10-CM

## 2023-01-19 DIAGNOSIS — H25.13 NUCLEAR SCLEROSIS, BILATERAL: Primary | ICD-10-CM

## 2023-01-19 PROCEDURE — 1101F PR PT FALLS ASSESS DOC 0-1 FALLS W/OUT INJ PAST YR: ICD-10-PCS | Mod: HCNC,CPTII,S$GLB, | Performed by: OPTOMETRIST

## 2023-01-19 PROCEDURE — 92004 COMPRE OPH EXAM NEW PT 1/>: CPT | Mod: HCNC,S$GLB,, | Performed by: OPTOMETRIST

## 2023-01-19 PROCEDURE — 99999 PR PBB SHADOW E&M-EST. PATIENT-LVL III: CPT | Mod: PBBFAC,HCNC,, | Performed by: OPTOMETRIST

## 2023-01-19 PROCEDURE — 92015 PR REFRACTION: ICD-10-PCS | Mod: HCNC,S$GLB,, | Performed by: OPTOMETRIST

## 2023-01-19 PROCEDURE — 92004 PR EYE EXAM, NEW PATIENT,COMPREHESV: ICD-10-PCS | Mod: HCNC,S$GLB,, | Performed by: OPTOMETRIST

## 2023-01-19 PROCEDURE — 3288F FALL RISK ASSESSMENT DOCD: CPT | Mod: HCNC,CPTII,S$GLB, | Performed by: OPTOMETRIST

## 2023-01-19 PROCEDURE — 4010F ACE/ARB THERAPY RXD/TAKEN: CPT | Mod: HCNC,CPTII,S$GLB, | Performed by: OPTOMETRIST

## 2023-01-19 PROCEDURE — 99999 PR PBB SHADOW E&M-EST. PATIENT-LVL III: ICD-10-PCS | Mod: PBBFAC,HCNC,, | Performed by: OPTOMETRIST

## 2023-01-19 PROCEDURE — 3288F PR FALLS RISK ASSESSMENT DOCUMENTED: ICD-10-PCS | Mod: HCNC,CPTII,S$GLB, | Performed by: OPTOMETRIST

## 2023-01-19 PROCEDURE — 4010F PR ACE/ARB THEARPY RXD/TAKEN: ICD-10-PCS | Mod: HCNC,CPTII,S$GLB, | Performed by: OPTOMETRIST

## 2023-01-19 PROCEDURE — 92015 DETERMINE REFRACTIVE STATE: CPT | Mod: HCNC,S$GLB,, | Performed by: OPTOMETRIST

## 2023-01-19 PROCEDURE — 1159F PR MEDICATION LIST DOCUMENTED IN MEDICAL RECORD: ICD-10-PCS | Mod: HCNC,CPTII,S$GLB, | Performed by: OPTOMETRIST

## 2023-01-19 PROCEDURE — 1126F PR PAIN SEVERITY QUANTIFIED, NO PAIN PRESENT: ICD-10-PCS | Mod: HCNC,CPTII,S$GLB, | Performed by: OPTOMETRIST

## 2023-01-19 PROCEDURE — 1101F PT FALLS ASSESS-DOCD LE1/YR: CPT | Mod: HCNC,CPTII,S$GLB, | Performed by: OPTOMETRIST

## 2023-01-19 PROCEDURE — 1126F AMNT PAIN NOTED NONE PRSNT: CPT | Mod: HCNC,CPTII,S$GLB, | Performed by: OPTOMETRIST

## 2023-01-19 PROCEDURE — 1159F MED LIST DOCD IN RCRD: CPT | Mod: HCNC,CPTII,S$GLB, | Performed by: OPTOMETRIST

## 2023-01-19 NOTE — PROGRESS NOTES
HPI    72 year old female is present today for annual Exam: C/o Decresed VA Up   Close.   Pt does not Drive anymore, needs bright light to read   (-)drops  (-)flashes  (-)floaters  (-)diplopia    Diabetic no  No results found for: HGBA1C    OCULAR HISTORY  Last Eye Exam 20 years ago   (-)eye surgery   (-)diagnosed or treated for any eye conditions or diseases none     FAMILY HISTORY  Glaucoma: unknown, pt adopted     Last edited by Corinne Fitzgerald on 1/19/2023  1:33 PM.            Assessment /Plan     For exam results, see Encounter Report.    Nuclear sclerosis, bilateral  -Educated patient on presence of cataracts at today's exam, monitor at annual dilated fundus exam. 5+ years surgical estimate.    Glaucoma screening  -Monitor with annual eye exam and IOP check    Hyperopia with presbyopia of both eyes  Eyeglass Final Rx       Eyeglass Final Rx         Sphere Cylinder Dist VA Add    Right +1.25 Sphere 20/30 +2.50    Left +1.00 Sphere 20/25 +2.50      Type: Bifocal    Expiration Date: 1/19/2024                      RTC 1 yr

## 2023-02-09 DIAGNOSIS — Z00.00 ENCOUNTER FOR MEDICARE ANNUAL WELLNESS EXAM: ICD-10-CM

## 2023-02-10 ENCOUNTER — PATIENT MESSAGE (OUTPATIENT)
Dept: INTERNAL MEDICINE | Facility: CLINIC | Age: 73
End: 2023-02-10
Payer: MEDICARE

## 2023-02-10 DIAGNOSIS — M19.90 OSTEOARTHRITIS, UNSPECIFIED OSTEOARTHRITIS TYPE, UNSPECIFIED SITE: ICD-10-CM

## 2023-02-10 DIAGNOSIS — I73.9 PAD (PERIPHERAL ARTERY DISEASE): ICD-10-CM

## 2023-02-10 RX ORDER — HYDROCODONE BITARTRATE AND ACETAMINOPHEN 10; 325 MG/1; MG/1
1 TABLET ORAL EVERY 12 HOURS PRN
Qty: 60 TABLET | Refills: 0 | Status: SHIPPED | OUTPATIENT
Start: 2023-02-10 | End: 2023-11-03

## 2023-02-10 NOTE — TELEPHONE ENCOUNTER
Care Due:                  Date            Visit Type   Department     Provider  --------------------------------------------------------------------------------                                EP -                              PRIMARY      [facility] INTERNAL  Last Visit: 10-      CARE ([facility])   MEDICINE       Gordo Naik                               -                              PRIMARY      [facility] INTERNAL  Next Visit: 02-      CARE ([facility])   MEDICINE       Gordo Naik                                                            Last  Test          Frequency    Reason                     Performed    Due Date  --------------------------------------------------------------------------------    CBC.........  12 months..  clopidogreL..............  05- 04-    CMP.........  12 months..  ezetimibe, lisinopriL,     05- 04-                             rosuvastatin.............    Lipid Panel.  12 months..  ezetimibe, rosuvastatin..  11-   11-    [facility] Embedded Care Gaps. Reference number: [id number]. 2/10/2023   3:31:00 PM CST

## 2023-02-27 ENCOUNTER — PATIENT MESSAGE (OUTPATIENT)
Dept: INTERNAL MEDICINE | Facility: CLINIC | Age: 73
End: 2023-02-27
Payer: MEDICARE

## 2023-02-27 DIAGNOSIS — M79.3 LIPODERMATOSCLEROSIS OF BOTH LOWER EXTREMITIES: ICD-10-CM

## 2023-02-27 RX ORDER — TRIAMCINOLONE ACETONIDE 1 MG/G
OINTMENT TOPICAL 2 TIMES DAILY PRN
Qty: 454 G | Refills: 3 | Status: SHIPPED | OUTPATIENT
Start: 2023-02-27 | End: 2023-11-22

## 2023-03-11 ENCOUNTER — PATIENT MESSAGE (OUTPATIENT)
Dept: ADMINISTRATIVE | Facility: HOSPITAL | Age: 73
End: 2023-03-11
Payer: MEDICARE

## 2023-03-27 ENCOUNTER — PATIENT MESSAGE (OUTPATIENT)
Dept: INTERNAL MEDICINE | Facility: CLINIC | Age: 73
End: 2023-03-27
Payer: MEDICARE

## 2023-03-27 RX ORDER — FUROSEMIDE 20 MG/1
40 TABLET ORAL DAILY
Qty: 90 TABLET | Refills: 3 | Status: SHIPPED | OUTPATIENT
Start: 2023-03-27 | End: 2023-08-22 | Stop reason: SDUPTHER

## 2023-03-27 NOTE — TELEPHONE ENCOUNTER
No new care gaps identified.  [facility] Embedded Care Gaps. Reference number: [id number]. 3/27/2023   1:13:46 PM CDT

## 2023-03-29 ENCOUNTER — LAB VISIT (OUTPATIENT)
Dept: LAB | Facility: HOSPITAL | Age: 73
End: 2023-03-29
Attending: INTERNAL MEDICINE
Payer: MEDICARE

## 2023-03-29 ENCOUNTER — OFFICE VISIT (OUTPATIENT)
Dept: INTERNAL MEDICINE | Facility: CLINIC | Age: 73
End: 2023-03-29
Payer: MEDICARE

## 2023-03-29 VITALS
DIASTOLIC BLOOD PRESSURE: 58 MMHG | OXYGEN SATURATION: 97 % | SYSTOLIC BLOOD PRESSURE: 110 MMHG | HEIGHT: 72 IN | HEART RATE: 62 BPM | BODY MASS INDEX: 41.74 KG/M2 | RESPIRATION RATE: 18 BRPM

## 2023-03-29 DIAGNOSIS — F17.200 SMOKER: ICD-10-CM

## 2023-03-29 DIAGNOSIS — I70.235 ATHEROSCLEROSIS OF NATIVE ARTERIES OF RIGHT LEG WITH ULCERATION OF OTHER PART OF FOOT: ICD-10-CM

## 2023-03-29 DIAGNOSIS — M79.3 LIPODERMATOSCLEROSIS OF BOTH LOWER EXTREMITIES: Primary | ICD-10-CM

## 2023-03-29 DIAGNOSIS — L97.919 VENOUS STASIS ULCERS OF BOTH LOWER EXTREMITIES: ICD-10-CM

## 2023-03-29 DIAGNOSIS — L97.929 VENOUS STASIS ULCERS OF BOTH LOWER EXTREMITIES: ICD-10-CM

## 2023-03-29 DIAGNOSIS — E66.01 MORBID OBESITY: ICD-10-CM

## 2023-03-29 DIAGNOSIS — R73.9 HYPERGLYCEMIA: ICD-10-CM

## 2023-03-29 DIAGNOSIS — I83.029 VENOUS STASIS ULCERS OF BOTH LOWER EXTREMITIES: ICD-10-CM

## 2023-03-29 DIAGNOSIS — I83.019 VENOUS STASIS ULCERS OF BOTH LOWER EXTREMITIES: ICD-10-CM

## 2023-03-29 LAB
ALBUMIN SERPL BCP-MCNC: 4.3 G/DL (ref 3.5–5.2)
ALP SERPL-CCNC: 55 U/L (ref 55–135)
ALT SERPL W/O P-5'-P-CCNC: 11 U/L (ref 10–44)
ANION GAP SERPL CALC-SCNC: 15 MMOL/L (ref 8–16)
AST SERPL-CCNC: 18 U/L (ref 10–40)
BASOPHILS # BLD AUTO: 0.05 K/UL (ref 0–0.2)
BASOPHILS NFR BLD: 0.9 % (ref 0–1.9)
BILIRUB SERPL-MCNC: 0.3 MG/DL (ref 0.1–1)
BUN SERPL-MCNC: 16 MG/DL (ref 8–23)
CALCIUM SERPL-MCNC: 10 MG/DL (ref 8.7–10.5)
CHLORIDE SERPL-SCNC: 105 MMOL/L (ref 95–110)
CHOLEST SERPL-MCNC: 143 MG/DL (ref 120–199)
CHOLEST/HDLC SERPL: 3.1 {RATIO} (ref 2–5)
CO2 SERPL-SCNC: 20 MMOL/L (ref 23–29)
CREAT SERPL-MCNC: 0.8 MG/DL (ref 0.5–1.4)
DIFFERENTIAL METHOD: ABNORMAL
EOSINOPHIL # BLD AUTO: 0.1 K/UL (ref 0–0.5)
EOSINOPHIL NFR BLD: 1.4 % (ref 0–8)
ERYTHROCYTE [DISTWIDTH] IN BLOOD BY AUTOMATED COUNT: 15.2 % (ref 11.5–14.5)
EST. GFR  (NO RACE VARIABLE): >60 ML/MIN/1.73 M^2
ESTIMATED AVG GLUCOSE: 100 MG/DL (ref 68–131)
GLUCOSE SERPL-MCNC: 73 MG/DL (ref 70–110)
HBA1C MFR BLD: 5.1 % (ref 4–5.6)
HCT VFR BLD AUTO: 36.9 % (ref 37–48.5)
HDLC SERPL-MCNC: 46 MG/DL (ref 40–75)
HDLC SERPL: 32.2 % (ref 20–50)
HGB BLD-MCNC: 12.4 G/DL (ref 12–16)
IMM GRANULOCYTES # BLD AUTO: 0.04 K/UL (ref 0–0.04)
IMM GRANULOCYTES NFR BLD AUTO: 0.7 % (ref 0–0.5)
LDLC SERPL CALC-MCNC: 75.2 MG/DL (ref 63–159)
LYMPHOCYTES # BLD AUTO: 1.2 K/UL (ref 1–4.8)
LYMPHOCYTES NFR BLD: 21.5 % (ref 18–48)
MCH RBC QN AUTO: 32 PG (ref 27–31)
MCHC RBC AUTO-ENTMCNC: 33.6 G/DL (ref 32–36)
MCV RBC AUTO: 95 FL (ref 82–98)
MONOCYTES # BLD AUTO: 1.2 K/UL (ref 0.3–1)
MONOCYTES NFR BLD: 21.9 % (ref 4–15)
NEUTROPHILS # BLD AUTO: 3 K/UL (ref 1.8–7.7)
NEUTROPHILS NFR BLD: 53.6 % (ref 38–73)
NONHDLC SERPL-MCNC: 97 MG/DL
NRBC BLD-RTO: 0 /100 WBC
PLATELET # BLD AUTO: 116 K/UL (ref 150–450)
PMV BLD AUTO: 12.5 FL (ref 9.2–12.9)
POTASSIUM SERPL-SCNC: 4.4 MMOL/L (ref 3.5–5.1)
PROT SERPL-MCNC: 7.9 G/DL (ref 6–8.4)
RBC # BLD AUTO: 3.87 M/UL (ref 4–5.4)
SODIUM SERPL-SCNC: 140 MMOL/L (ref 136–145)
TRIGL SERPL-MCNC: 109 MG/DL (ref 30–150)
WBC # BLD AUTO: 5.58 K/UL (ref 3.9–12.7)

## 2023-03-29 PROCEDURE — 85025 COMPLETE CBC W/AUTO DIFF WBC: CPT | Mod: HCNC | Performed by: INTERNAL MEDICINE

## 2023-03-29 PROCEDURE — 99499 UNLISTED E&M SERVICE: CPT | Mod: HCNC,S$GLB,, | Performed by: INTERNAL MEDICINE

## 2023-03-29 PROCEDURE — 1101F PR PT FALLS ASSESS DOC 0-1 FALLS W/OUT INJ PAST YR: ICD-10-PCS | Mod: HCNC,CPTII,S$GLB, | Performed by: INTERNAL MEDICINE

## 2023-03-29 PROCEDURE — 99999 PR PBB SHADOW E&M-EST. PATIENT-LVL V: CPT | Mod: PBBFAC,HCNC,, | Performed by: INTERNAL MEDICINE

## 2023-03-29 PROCEDURE — 3008F PR BODY MASS INDEX (BMI) DOCUMENTED: ICD-10-PCS | Mod: HCNC,CPTII,S$GLB, | Performed by: INTERNAL MEDICINE

## 2023-03-29 PROCEDURE — 3078F PR MOST RECENT DIASTOLIC BLOOD PRESSURE < 80 MM HG: ICD-10-PCS | Mod: HCNC,CPTII,S$GLB, | Performed by: INTERNAL MEDICINE

## 2023-03-29 PROCEDURE — 83036 HEMOGLOBIN GLYCOSYLATED A1C: CPT | Mod: HCNC | Performed by: INTERNAL MEDICINE

## 2023-03-29 PROCEDURE — 3288F PR FALLS RISK ASSESSMENT DOCUMENTED: ICD-10-PCS | Mod: HCNC,CPTII,S$GLB, | Performed by: INTERNAL MEDICINE

## 2023-03-29 PROCEDURE — 1159F PR MEDICATION LIST DOCUMENTED IN MEDICAL RECORD: ICD-10-PCS | Mod: HCNC,CPTII,S$GLB, | Performed by: INTERNAL MEDICINE

## 2023-03-29 PROCEDURE — 3074F PR MOST RECENT SYSTOLIC BLOOD PRESSURE < 130 MM HG: ICD-10-PCS | Mod: HCNC,CPTII,S$GLB, | Performed by: INTERNAL MEDICINE

## 2023-03-29 PROCEDURE — 99214 OFFICE O/P EST MOD 30 MIN: CPT | Mod: HCNC,S$GLB,, | Performed by: INTERNAL MEDICINE

## 2023-03-29 PROCEDURE — 4010F PR ACE/ARB THEARPY RXD/TAKEN: ICD-10-PCS | Mod: HCNC,CPTII,S$GLB, | Performed by: INTERNAL MEDICINE

## 2023-03-29 PROCEDURE — 1126F PR PAIN SEVERITY QUANTIFIED, NO PAIN PRESENT: ICD-10-PCS | Mod: HCNC,CPTII,S$GLB, | Performed by: INTERNAL MEDICINE

## 2023-03-29 PROCEDURE — 1160F RVW MEDS BY RX/DR IN RCRD: CPT | Mod: HCNC,CPTII,S$GLB, | Performed by: INTERNAL MEDICINE

## 2023-03-29 PROCEDURE — 1160F PR REVIEW ALL MEDS BY PRESCRIBER/CLIN PHARMACIST DOCUMENTED: ICD-10-PCS | Mod: HCNC,CPTII,S$GLB, | Performed by: INTERNAL MEDICINE

## 2023-03-29 PROCEDURE — 80053 COMPREHEN METABOLIC PANEL: CPT | Mod: HCNC | Performed by: INTERNAL MEDICINE

## 2023-03-29 PROCEDURE — 1101F PT FALLS ASSESS-DOCD LE1/YR: CPT | Mod: HCNC,CPTII,S$GLB, | Performed by: INTERNAL MEDICINE

## 2023-03-29 PROCEDURE — 3288F FALL RISK ASSESSMENT DOCD: CPT | Mod: HCNC,CPTII,S$GLB, | Performed by: INTERNAL MEDICINE

## 2023-03-29 PROCEDURE — 36415 COLL VENOUS BLD VENIPUNCTURE: CPT | Mod: HCNC | Performed by: INTERNAL MEDICINE

## 2023-03-29 PROCEDURE — 1159F MED LIST DOCD IN RCRD: CPT | Mod: HCNC,CPTII,S$GLB, | Performed by: INTERNAL MEDICINE

## 2023-03-29 PROCEDURE — 80061 LIPID PANEL: CPT | Mod: HCNC | Performed by: INTERNAL MEDICINE

## 2023-03-29 PROCEDURE — 3008F BODY MASS INDEX DOCD: CPT | Mod: HCNC,CPTII,S$GLB, | Performed by: INTERNAL MEDICINE

## 2023-03-29 PROCEDURE — 3078F DIAST BP <80 MM HG: CPT | Mod: HCNC,CPTII,S$GLB, | Performed by: INTERNAL MEDICINE

## 2023-03-29 PROCEDURE — 99214 PR OFFICE/OUTPT VISIT, EST, LEVL IV, 30-39 MIN: ICD-10-PCS | Mod: HCNC,S$GLB,, | Performed by: INTERNAL MEDICINE

## 2023-03-29 PROCEDURE — 3074F SYST BP LT 130 MM HG: CPT | Mod: HCNC,CPTII,S$GLB, | Performed by: INTERNAL MEDICINE

## 2023-03-29 PROCEDURE — 99499 RISK ADDL DX/OHS AUDIT: ICD-10-PCS | Mod: HCNC,S$GLB,, | Performed by: INTERNAL MEDICINE

## 2023-03-29 PROCEDURE — 4010F ACE/ARB THERAPY RXD/TAKEN: CPT | Mod: HCNC,CPTII,S$GLB, | Performed by: INTERNAL MEDICINE

## 2023-03-29 PROCEDURE — 1126F AMNT PAIN NOTED NONE PRSNT: CPT | Mod: HCNC,CPTII,S$GLB, | Performed by: INTERNAL MEDICINE

## 2023-03-29 PROCEDURE — 99999 PR PBB SHADOW E&M-EST. PATIENT-LVL V: ICD-10-PCS | Mod: PBBFAC,HCNC,, | Performed by: INTERNAL MEDICINE

## 2023-03-29 NOTE — PROGRESS NOTES
Subjective:       Patient ID: Ana Patel is a 73 y.o. female.    Chief Complaint: Follow-up    Patient is here for followup for chronic conditions.    Her leg is doing btr. Has had home health for a while. No longer any open wounds RLE. Seeing Dr Pham a vascular specialist.    Chronic knee pains, has been able to get off hydrocodone (which was helpful for leg sores) and now just on tramadol.    Still smoking and still not ready to quit.    Review of Systems   Constitutional:  Negative for activity change, appetite change and unexpected weight change.   HENT:  Negative for sinus pressure and sore throat.    Eyes:  Negative for visual disturbance.   Respiratory:  Negative for cough, chest tightness and shortness of breath.    Cardiovascular:  Positive for leg swelling (chronic; stable). Negative for chest pain.   Gastrointestinal:  Negative for abdominal distention and abdominal pain.   Genitourinary:  Negative for difficulty urinating and urgency.   Musculoskeletal:  Positive for arthralgias. Negative for joint swelling.   Skin:  Positive for rash (necrobiosis right leg and eczema). Negative for wound (healed).   Neurological:  Negative for tremors, syncope and weakness.         Objective:      Physical Exam  Vitals reviewed.   Constitutional:       General: She is not in acute distress.     Appearance: Normal appearance. She is well-developed. She is obese. She is not ill-appearing, toxic-appearing or diaphoretic.      Comments: Sitting in wheelchair   HENT:      Head: Normocephalic and atraumatic.   Eyes:      General: No scleral icterus.     Extraocular Movements: Extraocular movements intact.      Conjunctiva/sclera: Conjunctivae normal.   Neck:      Thyroid: No thyromegaly.   Cardiovascular:      Rate and Rhythm: Normal rate and regular rhythm.      Heart sounds: Normal heart sounds. No murmur heard.    No friction rub. No gallop.      Comments: Decreased pulses bilateral lower  extremities  Pulmonary:      Effort: Pulmonary effort is normal. No respiratory distress.      Breath sounds: Normal breath sounds. No wheezing or rales.   Abdominal:      General: Bowel sounds are normal. There is no distension.      Palpations: Abdomen is soft. There is no mass.      Tenderness: There is no abdominal tenderness. There is no guarding or rebound.   Musculoskeletal:         General: Normal range of motion.      Cervical back: Normal range of motion.      Right lower leg: Edema present.      Left lower leg: Edema present.   Lymphadenopathy:      Cervical: No cervical adenopathy.   Skin:     Findings: No erythema.      Comments: Bilat LE lower leg in ace wraps, no sores seen today   Neurological:      Mental Status: She is alert and oriented to person, place, and time.   Psychiatric:         Speech: Speech normal.         Behavior: Behavior normal.       Assessment:       1. Lipodermatosclerosis of both lower extremities    2. Morbid obesity    3. Atherosclerosis of native arteries of right leg with ulceration of other part of foot    4. Venous stasis ulcers of both lower extremities    5. Smoker    6. Hyperglycemia        Plan:       Ana was seen today for follow-up.    Diagnoses and all orders for this visit:    Lipodermatosclerosis of both lower extremities  Seeing vascular specialist, has stents too    Morbid obesity  Has not been able to lose wt, next time discuss WLS    Atherosclerosis of native arteries of right leg with ulceration of other part of foot  -     CBC Auto Differential; Future  -     Comprehensive Metabolic Panel; Future  -     Lipid Panel; Future  On Plavix for stents    Venous stasis ulcers of both lower extremities  Has recently healed    Smoker  Not ready to quit    Hyperglycemia  -     Hemoglobin A1C; Future        Health Maintenance         Date Due Completion Date    Sign Pain Contract Never done ---    Complete Opioid Risk Tool Never done ---    Colorectal Cancer  Screening 05/28/2019 5/28/2018    Shingles Vaccine (3 of 3) 11/20/2020 9/25/2020    High Dose Statin 10/20/2023 10/20/2022    Mammogram 01/17/2024 1/17/2023    Lipid Panel 11/10/2026 11/10/2021    TETANUS VACCINE 11/10/2031 11/10/2021    Override on 8/24/2017: Declined   Has declines CRC screening         Follow up in about 6 months (around 9/29/2023).    Future Appointments   Date Time Provider Department Center   9/29/2023  2:20 PM Gordo Naik MD [facility]

## 2023-04-21 ENCOUNTER — PATIENT MESSAGE (OUTPATIENT)
Dept: ADMINISTRATIVE | Facility: HOSPITAL | Age: 73
End: 2023-04-21
Payer: MEDICARE

## 2023-04-24 DIAGNOSIS — M19.90 OSTEOARTHRITIS, UNSPECIFIED OSTEOARTHRITIS TYPE, UNSPECIFIED SITE: ICD-10-CM

## 2023-04-24 DIAGNOSIS — I73.9 PAD (PERIPHERAL ARTERY DISEASE): ICD-10-CM

## 2023-04-24 NOTE — TELEPHONE ENCOUNTER
No new care gaps identified.  [facility] Embedded Care Gaps. Reference number: [id number]. 4/24/2023   4:51:37 PM CDT

## 2023-04-25 RX ORDER — TRAMADOL HYDROCHLORIDE 50 MG/1
50 TABLET ORAL EVERY 12 HOURS PRN
Qty: 60 TABLET | Refills: 4 | Status: SHIPPED | OUTPATIENT
Start: 2023-04-25 | End: 2023-12-20

## 2023-05-31 RX ORDER — HYDROXYCHLOROQUINE SULFATE 200 MG/1
TABLET, FILM COATED ORAL
Qty: 90 TABLET | Refills: 0 | Status: SHIPPED | OUTPATIENT
Start: 2023-05-31 | End: 2023-08-22 | Stop reason: SDUPTHER

## 2023-06-19 DIAGNOSIS — I10 ESSENTIAL HYPERTENSION: ICD-10-CM

## 2023-06-19 RX ORDER — AMLODIPINE BESYLATE 5 MG/1
TABLET ORAL
Qty: 90 TABLET | Refills: 3 | Status: SHIPPED | OUTPATIENT
Start: 2023-06-19 | End: 2023-08-22 | Stop reason: SDUPTHER

## 2023-06-19 RX ORDER — MINERAL OIL
ENEMA (ML) RECTAL
Qty: 90 TABLET | Refills: 3 | Status: SHIPPED | OUTPATIENT
Start: 2023-06-19 | End: 2023-08-22 | Stop reason: SDUPTHER

## 2023-06-19 NOTE — TELEPHONE ENCOUNTER
No care due was identified.  Health [facility] Embedded Care Due Messages. Reference number: [id number].   6/19/2023 11:26:26 AM CDT

## 2023-06-19 NOTE — TELEPHONE ENCOUNTER
No care due was identified.  Health [facility] Embedded Care Due Messages. Reference number: [id number].   6/19/2023 11:27:19 AM CDT

## 2023-06-19 NOTE — TELEPHONE ENCOUNTER
Refill Decision Note      Refill Decision Note   Ana Patel  is requesting a refill authorization.  Brief Assessment and Rationale for Refill:  Approve     Medication Therapy Plan:         Comments:     Note composed:3:22 PM 06/19/2023             Appointments     Last Visit   3/29/2023 Gordo Naik MD   Next Visit   6/19/2023 Gordo Naik MD

## 2023-06-20 NOTE — TELEPHONE ENCOUNTER
Refill Decision Note   Analayne Patel  is requesting a refill authorization.  Brief Assessment and Rationale for Refill:  Approve     Medication Therapy Plan:       Medication Reconciliation Completed: No   Comments:     No Care Gaps recommended.     Note composed:8:56 PM 06/19/2023

## 2023-06-27 DIAGNOSIS — I10 ESSENTIAL HYPERTENSION: ICD-10-CM

## 2023-06-27 RX ORDER — LISINOPRIL 20 MG/1
TABLET ORAL
Qty: 90 TABLET | Refills: 3 | Status: SHIPPED | OUTPATIENT
Start: 2023-06-27 | End: 2023-08-22 | Stop reason: SDUPTHER

## 2023-06-27 NOTE — TELEPHONE ENCOUNTER
Refill Decision Note   Ana Patel  is requesting a refill authorization.  Brief Assessment and Rationale for Refill:  Approve     Medication Therapy Plan:         Comments:     Note composed:1:01 PM 06/27/2023             Appointments     Last Visit   3/29/2023 Gordo Niak MD   Next Visit   9/22/2023 Gordo Naik MD

## 2023-06-27 NOTE — TELEPHONE ENCOUNTER
No care due was identified.  [facility] Embedded Care Due Messages. Reference number: [id number].   6/27/2023 12:40:27 PM CDT

## 2023-06-28 DIAGNOSIS — E78.5 HYPERLIPIDEMIA, UNSPECIFIED HYPERLIPIDEMIA TYPE: ICD-10-CM

## 2023-06-28 RX ORDER — ROSUVASTATIN CALCIUM 5 MG/1
TABLET, COATED ORAL
Qty: 90 TABLET | Refills: 3 | Status: SHIPPED | OUTPATIENT
Start: 2023-06-28 | End: 2023-11-03

## 2023-06-28 NOTE — TELEPHONE ENCOUNTER
Refill Routing Note   Medication(s) are not appropriate for processing by [facility] for the following reason(s):      Allergy or intolerance    [facility] action(s):  Defer None identified          Appointments  past 12m or future 3m with PCP    Date Provider   Last Visit   3/29/2023 Gordo Naik MD   Next Visit   9/22/2023 Gordo Naik MD   ED visits in past 90 days: 0        Note composed:9:18 AM 06/28/2023

## 2023-06-28 NOTE — TELEPHONE ENCOUNTER
No care due was identified.  [facility] Embedded Care Due Messages. Reference number: [id number].   6/28/2023 3:08:26 AM CDT

## 2023-08-17 ENCOUNTER — PATIENT MESSAGE (OUTPATIENT)
Dept: INTERNAL MEDICINE | Facility: CLINIC | Age: 73
End: 2023-08-17
Payer: MEDICARE

## 2023-08-17 DIAGNOSIS — I73.9 PAD (PERIPHERAL ARTERY DISEASE): ICD-10-CM

## 2023-08-17 DIAGNOSIS — M19.90 OSTEOARTHRITIS, UNSPECIFIED OSTEOARTHRITIS TYPE, UNSPECIFIED SITE: ICD-10-CM

## 2023-08-17 RX ORDER — TRAMADOL HYDROCHLORIDE 50 MG/1
50 TABLET ORAL EVERY 12 HOURS PRN
Qty: 60 TABLET | Refills: 4 | Status: CANCELLED | OUTPATIENT
Start: 2023-08-17

## 2023-08-17 NOTE — TELEPHONE ENCOUNTER
No care due was identified.  [facility] Embedded Care Due Messages. Reference number: [id number].   8/17/2023 11:46:02 AM CDT

## 2023-08-22 DIAGNOSIS — I10 ESSENTIAL HYPERTENSION: ICD-10-CM

## 2023-08-22 DIAGNOSIS — E78.5 HYPERLIPIDEMIA, UNSPECIFIED HYPERLIPIDEMIA TYPE: ICD-10-CM

## 2023-08-22 NOTE — TELEPHONE ENCOUNTER
No care due was identified.  [facility] Embedded Care Due Messages. Reference number: [id number].   8/22/2023 2:44:21 PM CDT

## 2023-08-22 NOTE — TELEPHONE ENCOUNTER
No care due was identified.  [facility] Embedded Care Due Messages. Reference number: [id number].   8/22/2023 2:45:05 PM CDT

## 2023-08-23 RX ORDER — CLOPIDOGREL BISULFATE 75 MG/1
75 TABLET ORAL DAILY
Qty: 90 TABLET | Refills: 1 | Status: SHIPPED | OUTPATIENT
Start: 2023-08-23 | End: 2024-04-02

## 2023-08-23 RX ORDER — LISINOPRIL 20 MG/1
TABLET ORAL
Qty: 90 TABLET | Refills: 1 | Status: SHIPPED | OUTPATIENT
Start: 2023-08-23

## 2023-08-23 RX ORDER — FUROSEMIDE 20 MG/1
40 TABLET ORAL DAILY
Qty: 90 TABLET | Refills: 1 | Status: SHIPPED | OUTPATIENT
Start: 2023-08-23 | End: 2023-09-28

## 2023-08-23 RX ORDER — HYDROXYCHLOROQUINE SULFATE 200 MG/1
200 TABLET, FILM COATED ORAL DAILY
Qty: 90 TABLET | Refills: 0 | Status: SHIPPED | OUTPATIENT
Start: 2023-08-23 | End: 2023-10-26

## 2023-08-23 RX ORDER — EZETIMIBE 10 MG/1
10 TABLET ORAL DAILY
Qty: 90 TABLET | Refills: 1 | Status: SHIPPED | OUTPATIENT
Start: 2023-08-23 | End: 2023-09-15

## 2023-08-23 RX ORDER — AMLODIPINE BESYLATE 5 MG/1
5 TABLET ORAL DAILY
Qty: 90 TABLET | Refills: 1 | Status: SHIPPED | OUTPATIENT
Start: 2023-08-23 | End: 2024-02-02

## 2023-08-23 RX ORDER — MINERAL OIL
180 ENEMA (ML) RECTAL DAILY
Qty: 90 TABLET | Refills: 0 | Status: SHIPPED | OUTPATIENT
Start: 2023-08-23 | End: 2023-11-04

## 2023-08-24 ENCOUNTER — TELEPHONE (OUTPATIENT)
Dept: INTERNAL MEDICINE | Facility: CLINIC | Age: 73
End: 2023-08-24
Payer: MEDICARE

## 2023-08-24 ENCOUNTER — PATIENT MESSAGE (OUTPATIENT)
Dept: INTERNAL MEDICINE | Facility: CLINIC | Age: 73
End: 2023-08-24
Payer: MEDICARE

## 2023-09-10 ENCOUNTER — PATIENT MESSAGE (OUTPATIENT)
Dept: INTERNAL MEDICINE | Facility: CLINIC | Age: 73
End: 2023-09-10
Payer: MEDICARE

## 2023-09-10 DIAGNOSIS — F17.200 SMOKER: Primary | ICD-10-CM

## 2023-09-11 RX ORDER — NICOTINE 7MG/24HR
1 PATCH, TRANSDERMAL 24 HOURS TRANSDERMAL DAILY
Qty: 14 PATCH | Refills: 11 | Status: SHIPPED | OUTPATIENT
Start: 2023-09-11

## 2023-09-15 DIAGNOSIS — E78.5 HYPERLIPIDEMIA, UNSPECIFIED HYPERLIPIDEMIA TYPE: ICD-10-CM

## 2023-09-15 RX ORDER — EZETIMIBE 10 MG/1
10 TABLET ORAL DAILY
Qty: 90 TABLET | Refills: 1 | Status: SHIPPED | OUTPATIENT
Start: 2023-09-15 | End: 2023-11-03

## 2023-09-18 ENCOUNTER — PATIENT OUTREACH (OUTPATIENT)
Dept: ADMINISTRATIVE | Facility: HOSPITAL | Age: 73
End: 2023-09-18
Payer: MEDICARE

## 2023-09-18 ENCOUNTER — PATIENT MESSAGE (OUTPATIENT)
Dept: ADMINISTRATIVE | Facility: HOSPITAL | Age: 73
End: 2023-09-18
Payer: MEDICARE

## 2023-09-18 DIAGNOSIS — Z78.0 POST-MENOPAUSAL: Primary | ICD-10-CM

## 2023-09-18 NOTE — LETTER
October 24, 2023    Ana Patel  [address] [place] [id number]             [facility]  [address]  [facility] [address]  Phone: [phone] Dear Eileen, [facility] is committed to your overall health and would like to ensure that you are up to date on your recommended test and/or procedures.  Gordo Naik MD has found that your chart shows you may be due for a DEXA SCAN (BONE DENSITY OSTEOPOROSIS SCREENING).      If you have had a DEXA SCAN at another facility, please let us know so that we may obtain copies from that facility.      If you have an upcoming scheduled appointment for the above test and/or procedures, please disregard this letter.  Otherwise, please contact us through your [facility] portal or by calling [phone] and we will assist in scheduling this procedure for you.     Thank you for letting us care for you,     Sincerely,     oGrdo Naik MD and your [facility] Team

## 2023-09-18 NOTE — PROGRESS NOTES
OSTEOPOROSIS SCREENING   The Patient had a recent fracture and due for an Osteoporosis screening.  A DEXA is to be completed at least 6 months after the fracture date IMPACT date 1/14/2024    Outreach to patient in reference to an OSTEOPOROSIS SCREENING.      DEXA order placed

## 2023-09-27 ENCOUNTER — TELEPHONE (OUTPATIENT)
Dept: INTERNAL MEDICINE | Facility: CLINIC | Age: 73
End: 2023-09-27
Payer: MEDICARE

## 2023-09-27 NOTE — TELEPHONE ENCOUNTER
Spoke with patient pt. states that she would like to go to her ortho appointment. The ortho dept. Noted that the pt. Denied visit. Pt states that she is just waiting on a ramp for her home cant you please re enter the referral because it was removed

## 2023-09-28 RX ORDER — FUROSEMIDE 20 MG/1
40 TABLET ORAL
Qty: 180 TABLET | Refills: 1 | Status: SHIPPED | OUTPATIENT
Start: 2023-09-28

## 2023-09-28 NOTE — TELEPHONE ENCOUNTER
Refill Decision Note   Ana Patel  is requesting a refill authorization.  Brief Assessment and Rationale for Refill:  Approve     Medication Therapy Plan:         Comments:     Note composed:5:38 PM 09/28/2023             Appointments     Last Visit   3/29/2023 Gordo Naik MD   Next Visit   Visit date not found Gordo Naik MD

## 2023-10-02 NOTE — TELEPHONE ENCOUNTER
Hi,  Please let patient know I was away from the office when she called and I just saw your message -- I am sorry for the delay in replying.    I would need to do a video visit with her about the ramp and to check in on how she is doing overall. I have not seen her since her recent hospital stay. Please offer her an appt with me, I have an open slot on 10/6 and 10/13 for video visits    Thank you, Gordo Naik

## 2023-10-13 ENCOUNTER — TELEPHONE (OUTPATIENT)
Dept: INTERNAL MEDICINE | Facility: CLINIC | Age: 73
End: 2023-10-13
Payer: MEDICARE

## 2023-10-13 NOTE — TELEPHONE ENCOUNTER
Hi, please call back -- yes it was received but I need to see the patient first in clinic or for a video visit  Thank you, Gordo Naik

## 2023-10-13 NOTE — TELEPHONE ENCOUNTER
----- Message from Teodoro Grant MA sent at 10/12/2023  2:14 PM CDT -----  Contact: [phone]    ----- Message -----  From: Eboni Mary  Sent: 10/12/2023  12:34 PM CDT  To: [facility] Staff    [email or web address]     Patient is calling for Medical Advice regarding: Casandra [employer] is calling to verify paperwork was received     Would like response via [place]:  call back     Comments:

## 2023-10-26 RX ORDER — HYDROXYCHLOROQUINE SULFATE 200 MG/1
200 TABLET, FILM COATED ORAL
Qty: 90 TABLET | Refills: 10 | Status: SHIPPED | OUTPATIENT
Start: 2023-10-26

## 2023-11-03 ENCOUNTER — OFFICE VISIT (OUTPATIENT)
Dept: INTERNAL MEDICINE | Facility: CLINIC | Age: 73
End: 2023-11-03
Payer: MEDICARE

## 2023-11-03 DIAGNOSIS — M79.3 LIPODERMATOSCLEROSIS OF BOTH LOWER EXTREMITIES: ICD-10-CM

## 2023-11-03 DIAGNOSIS — M19.90 OSTEOARTHRITIS, UNSPECIFIED OSTEOARTHRITIS TYPE, UNSPECIFIED SITE: ICD-10-CM

## 2023-11-03 DIAGNOSIS — I70.235 ATHEROSCLEROSIS OF NATIVE ARTERIES OF RIGHT LEG WITH ULCERATION OF OTHER PART OF FOOT: ICD-10-CM

## 2023-11-03 DIAGNOSIS — I73.9 PAD (PERIPHERAL ARTERY DISEASE): ICD-10-CM

## 2023-11-03 DIAGNOSIS — Z12.11 COLON CANCER SCREENING: Primary | ICD-10-CM

## 2023-11-03 PROCEDURE — 4010F PR ACE/ARB THEARPY RXD/TAKEN: ICD-10-PCS | Mod: HCNC,CPTII,95, | Performed by: INTERNAL MEDICINE

## 2023-11-03 PROCEDURE — 3044F PR MOST RECENT HEMOGLOBIN A1C LEVEL <7.0%: ICD-10-PCS | Mod: HCNC,CPTII,95, | Performed by: INTERNAL MEDICINE

## 2023-11-03 PROCEDURE — 4010F ACE/ARB THERAPY RXD/TAKEN: CPT | Mod: HCNC,CPTII,95, | Performed by: INTERNAL MEDICINE

## 2023-11-03 PROCEDURE — 99214 OFFICE O/P EST MOD 30 MIN: CPT | Mod: HCNC,95,, | Performed by: INTERNAL MEDICINE

## 2023-11-03 PROCEDURE — 1159F MED LIST DOCD IN RCRD: CPT | Mod: HCNC,CPTII,95, | Performed by: INTERNAL MEDICINE

## 2023-11-03 PROCEDURE — 99214 PR OFFICE/OUTPT VISIT, EST, LEVL IV, 30-39 MIN: ICD-10-PCS | Mod: HCNC,95,, | Performed by: INTERNAL MEDICINE

## 2023-11-03 PROCEDURE — 3044F HG A1C LEVEL LT 7.0%: CPT | Mod: HCNC,CPTII,95, | Performed by: INTERNAL MEDICINE

## 2023-11-03 PROCEDURE — 1160F RVW MEDS BY RX/DR IN RCRD: CPT | Mod: HCNC,CPTII,95, | Performed by: INTERNAL MEDICINE

## 2023-11-03 PROCEDURE — 1160F PR REVIEW ALL MEDS BY PRESCRIBER/CLIN PHARMACIST DOCUMENTED: ICD-10-PCS | Mod: HCNC,CPTII,95, | Performed by: INTERNAL MEDICINE

## 2023-11-03 PROCEDURE — 1159F PR MEDICATION LIST DOCUMENTED IN MEDICAL RECORD: ICD-10-PCS | Mod: HCNC,CPTII,95, | Performed by: INTERNAL MEDICINE

## 2023-11-03 NOTE — PROGRESS NOTES
Subjective:       Patient ID: Ana Patel is a 73 y.o. female.    Chief Complaint: No chief complaint on file.    The patient location is: home in the state of [place]    The chief complaint leading to consultation is: Patient is here for followup for chronic conditions.    Visit type: audiovisual  Total time spent with patient: 20  Each patient to whom he or she provides medical services by telemedicine is:  (1) informed of the relationship between the physician and patient and the respective role of any other health care provider with respect to management of the patient; and (2) notified that he or she may decline to receive medical services by telemedicine and may withdraw from such care at any time.    Notes:     L Ankle healed, can wear a boot but still diff wt bearing. She can stand with walker for 1 min.  Today's last day of home PT.     She will be getting a ramp at her home.    Still seeing Dr Todd at  -- for orthopedics/ankle.    She has quit smoking!    No other new health issues.      Review of Systems   Constitutional:  Negative for activity change and unexpected weight change.   HENT:  Negative for hearing loss, rhinorrhea and trouble swallowing.    Eyes:  Negative for discharge and visual disturbance.   Respiratory:  Negative for chest tightness and wheezing.    Cardiovascular:  Negative for chest pain and palpitations.   Gastrointestinal:  Negative for blood in stool, constipation, diarrhea and vomiting.   Endocrine: Negative for polydipsia and polyuria.   Genitourinary:  Negative for difficulty urinating, dysuria, hematuria and menstrual problem.   Musculoskeletal:  Positive for joint swelling (knees). Negative for arthralgias and neck pain.   Neurological:  Negative for weakness and headaches.   Psychiatric/Behavioral:  Negative for confusion and dysphoric mood.            Objective:      Physical Exam  Constitutional:       General: She is not in acute distress.     Appearance:  Normal appearance. She is well-developed. She is not ill-appearing, toxic-appearing or diaphoretic.      Comments: Well appearing, breathing comfortably, speaking full sentences, no coughing during telemed encounter     Pulmonary:      Effort: Pulmonary effort is normal. No respiratory distress.   Skin:     Findings: No rash.   Neurological:      Mental Status: She is alert and oriented to person, place, and time.   Psychiatric:         Behavior: Behavior normal.         Thought Content: Thought content normal.         Judgment: Judgment normal.         Assessment:       1. Colon cancer screening    2. Atherosclerosis of native arteries of right leg with ulceration of other part of foot    3. Lipodermatosclerosis of both lower extremities    4. PAD (peripheral artery disease)    5. Osteoarthritis, unspecified osteoarthritis type, unspecified site        Plan:       Diagnoses and all orders for this visit:    Colon cancer screening  -     Cologuard Screening (Multitarget Stool DNA); Future  -     Cologuard Screening (Multitarget Stool DNA)    Atherosclerosis of native arteries of right leg with ulceration of other part of foot  On plavix/ASA  Next time restart statin,    Lipodermatosclerosis of both lower extremities  See back Dr Los CORONADO (peripheral artery disease)  Next time restart statin, not sure why it was dced, recheck blood work too.  She has quit smoking!    Osteoarthritis, unspecified osteoarthritis type, unspecified site  Takes Tramadol with good efficacy    She will call to make in person visit once she has ramp installed      Health Maintenance         Date Due Completion Date    RSV Vaccine (Age 60+) (1 - 1-dose 60+ series) Never done ---    Colorectal Cancer Screening 05/28/2019 5/28/2018    DEXA Scan 10/09/2020 10/9/2017    Shingles Vaccine (3 of 3) 11/20/2020 9/25/2020    Influenza Vaccine (1) 09/01/2023 10/17/2022    COVID-19 Vaccine (6 - 2023-24 season) 09/01/2023 10/27/2022    Mammogram  01/17/2024 1/17/2023    High Dose Statin 06/28/2024 6/28/2023    Lipid Panel 03/29/2028 3/29/2023    TETANUS VACCINE 11/10/2031 11/10/2021    Override on 8/24/2017: Declined   Discuss above when I see her in person         No follow-ups on file.    No future appointments.

## 2023-11-17 ENCOUNTER — TELEPHONE (OUTPATIENT)
Dept: INTERNAL MEDICINE | Facility: CLINIC | Age: 73
End: 2023-11-17
Payer: MEDICARE

## 2023-11-17 DIAGNOSIS — Z12.11 COLON CANCER SCREENING: Primary | ICD-10-CM

## 2023-11-17 LAB — NONINV COLON CA DNA+OCC BLD SCRN STL QL: POSITIVE

## 2023-11-17 NOTE — TELEPHONE ENCOUNTER
Hi, please call her -- her stool test was positive, which means she should have a colonoscopy.    Please ask her if she is willing to have a colonoscopy at some point over the next months, if so I will place the order.    Let me know if patient has any questions.  Thank you, Gordo Naik

## 2023-11-29 ENCOUNTER — TELEPHONE (OUTPATIENT)
Dept: INTERNAL MEDICINE | Facility: CLINIC | Age: 73
End: 2023-11-29
Payer: MEDICARE

## 2023-11-29 DIAGNOSIS — Z12.11 COLON CANCER SCREENING: Primary | ICD-10-CM

## 2023-11-29 DIAGNOSIS — M79.3 LIPODERMATOSCLEROSIS OF BOTH LOWER EXTREMITIES: ICD-10-CM

## 2023-11-29 RX ORDER — TRIAMCINOLONE ACETONIDE 1 MG/G
OINTMENT TOPICAL
Qty: 454 G | Refills: 5 | OUTPATIENT
Start: 2023-11-29

## 2023-11-29 NOTE — TELEPHONE ENCOUNTER
Refill Decision Note   Ana Patel  is requesting a refill authorization.  Brief Assessment and Rationale for Refill:  Quick Discontinue     Medication Therapy Plan: E-Prescribing Status: Receipt confirmed by pharmacy (11/22/2023 10:57 AM CST)      Comments:     Note composed:10:31 AM 11/29/2023

## 2023-11-29 NOTE — TELEPHONE ENCOUNTER
No care due was identified.  Health [facility] Embedded Care Due Messages. Reference number: [id number].   11/29/2023 7:38:38 AM CST

## 2023-11-30 ENCOUNTER — TELEPHONE (OUTPATIENT)
Dept: ENDOSCOPY | Facility: HOSPITAL | Age: 73
End: 2023-11-30
Payer: MEDICARE

## 2023-11-30 NOTE — TELEPHONE ENCOUNTER
Contacted the patient to schedule an endoscopy procedure(s) 11/30/23. The patient did not answer the call and left a voice message requesting a call back.

## 2023-12-08 ENCOUNTER — PATIENT MESSAGE (OUTPATIENT)
Dept: ADMINISTRATIVE | Facility: HOSPITAL | Age: 73
End: 2023-12-08
Payer: MEDICARE

## 2023-12-08 ENCOUNTER — PATIENT OUTREACH (OUTPATIENT)
Dept: ADMINISTRATIVE | Facility: HOSPITAL | Age: 73
End: 2023-12-08
Payer: MEDICARE

## 2023-12-08 NOTE — PROGRESS NOTES
Health Maintenance Due   Topic Date Due    High Dose Statin  Never done    RSV Vaccine (Age 60+ and Pregnant patients) (1 - 1-dose 60+ series) Never done    DEXA Scan  10/09/2020    Shingles Vaccine (3 of 3) 2020    Influenza Vaccine (1) 2023    COVID-19 Vaccine (2023- season) 2023    Mammogram  2024     Population Health Chart Review & Patient Outreach Details    Updates Requested / Reviewed:     [x]  Care Everywhere    []     []  External Sources ([facility], [facility], DIS, etc.)    [] [facility]   [] [facility]   [] Other:    [x]  Care Team Updated   []  Removed  or Duplicate Orders   [x]  Immunization Reconciliation Completed / Queried    [x] [place]   [] [place]   [] [place]   [] [place]      Health Maintenance Topics Addressed and Outreach Outcomes / Actions Taken:             Breast Cancer Screening []  Mammogram Order Placed    []  Mammogram Screening Scheduled    []  External Records Requested & Care Team Updated if Applicable    []  External Records Uploaded & Care Team Updated if Applicable    []  Pt Declined Scheduling Mammogram    []  Pt Will Schedule with External Provider / Order Routed & Care Team Updated if Applicable              Cervical Cancer Screening []  Pap Smear Scheduled in Primary Care or OBGYN    []  External Records Requested & Care Team Updated if Applicable       []  External Records Uploaded, Care Team Updated, & History Updated if Applicable    []  Patient Declined Scheduling Pap Smear    []  Patient Will Schedule with External Provider & Care Team Updated if Applicable                  Colorectal Cancer Screening []  Colonoscopy Case Request / Referral / Home Test Order Placed    []  External Records Requested & Care Team Updated if Applicable    []  External Records Uploaded, Care Team Updated, & History Updated if Applicable    []  Patient Declined Completing Colon Cancer Screening    []  Patient Will Schedule with External Provider & Care  Team Updated if Applicable    []  Fit Kit Mailed (add the SmartPhrase under additional notes)    []  Reminded Patient to Complete Home Test                Diabetic Eye Exam []  Eye Exam Screening Order Placed    []  Eye Camera Scheduled or Optometry/Ophthalmology Referral Placed    []  External Records Requested & Care Team Updated if Applicable    []  External Records Uploaded, Care Team Updated, & History Updated if Applicable    []  Patient Declined Scheduling Eye Exam    []  Patient Will Schedule with External Provider & Care Team Updated if Applicable             Blood Pressure Control []  Primary Care Follow Up Visit Scheduled     []  Remote Blood Pressure Reading Captured    []  Patient Declined Remote Reading or Scheduling Appt - Escalated to PCP    []  Patient Will Call Back or Send Portal Message with Reading                 HbA1c & Other Labs []  Overdue Lab(s) Ordered    []  Overdue Lab(s) Scheduled    []  External Records Uploaded & Care Team Updated if Applicable    []  Primary Care Follow Up Visit Scheduled     []  Reminded Patient to Complete A1c Home Test    []  Patient Declined Scheduling Labs or Will Call Back to Schedule    []  Patient Will Schedule with External Provider / Order Routed, & Care Team Updated if Applicable           Primary Care Appointment []  Primary Care Appt Scheduled    []  Patient Declined Scheduling or Will Call Back to Schedule    []  Pt Established with External Provider, Updated Care Team, & Informed Pt to Notify Payor if Applicable           Medication Adherence /    Statin Use []  Primary Care Appointment Scheduled    []  Patient Reminded to  Prescription    []  Patient Declined, Provider Notified if Needed    []  Sent Provider Message to Review to Evaluate Pt for Statin, Add Exclusion Dx Codes, Document   Exclusion in Problem List, Change Statin Intensity Level to Moderate or High Intensity if Applicable                Osteoporosis Screening []  Dexa Order  Placed    []  Dexa Appointment Scheduled    []  External Records Requested & Care Team Updated    []  External Records Uploaded, Care Team Updated, & History Updated if Applicable    []  Patient Declined Scheduling Dexa or Will Call Back to Schedule    []  Patient Will Schedule with External Provider / Order Routed & Care Team Updated if Applicable       Additional Notes:    Portal message sent asking pt to schedule DEXA scan. Chart review completed as part of October MA Gap list report.

## 2023-12-11 ENCOUNTER — PATIENT OUTREACH (OUTPATIENT)
Dept: ADMINISTRATIVE | Facility: HOSPITAL | Age: 73
End: 2023-12-11
Payer: MEDICARE

## 2023-12-13 ENCOUNTER — TELEPHONE (OUTPATIENT)
Dept: ENDOSCOPY | Facility: HOSPITAL | Age: 73
End: 2023-12-13
Payer: MEDICARE

## 2023-12-14 NOTE — TELEPHONE ENCOUNTER
Contacted the patient to schedule an endoscopy procedure(s) 12/13/23. The patient did not answer the call and left a voice message requesting a call back.

## 2023-12-19 DIAGNOSIS — M19.90 OSTEOARTHRITIS, UNSPECIFIED OSTEOARTHRITIS TYPE, UNSPECIFIED SITE: ICD-10-CM

## 2023-12-19 DIAGNOSIS — I73.9 PAD (PERIPHERAL ARTERY DISEASE): ICD-10-CM

## 2023-12-19 NOTE — TELEPHONE ENCOUNTER
No care due was identified.  Health [facility] Embedded Care Due Messages. Reference number: [id number].   12/19/2023 5:41:59 PM CST

## 2023-12-20 RX ORDER — TRAMADOL HYDROCHLORIDE 50 MG/1
50 TABLET ORAL EVERY 12 HOURS PRN
Qty: 60 TABLET | Refills: 5 | Status: SHIPPED | OUTPATIENT
Start: 2023-12-20 | End: 2024-03-04 | Stop reason: SDUPTHER

## 2024-01-12 ENCOUNTER — TELEPHONE (OUTPATIENT)
Dept: INTERNAL MEDICINE | Facility: CLINIC | Age: 74
End: 2024-01-12
Payer: MEDICARE

## 2024-01-12 RX ORDER — BENZONATATE 200 MG/1
200 CAPSULE ORAL 3 TIMES DAILY PRN
Qty: 20 CAPSULE | Refills: 0 | Status: SHIPPED | OUTPATIENT
Start: 2024-01-12 | End: 2024-01-22

## 2024-01-12 NOTE — TELEPHONE ENCOUNTER
emailed on his chart requesting med for him and for his wife:   Orders Placed This Encounter    benzonatate (TESSALON) 200 MG capsule     Sent in

## 2024-02-09 ENCOUNTER — OFFICE VISIT (OUTPATIENT)
Dept: INTERNAL MEDICINE | Facility: CLINIC | Age: 74
End: 2024-02-09
Payer: MEDICARE

## 2024-02-09 ENCOUNTER — TELEPHONE (OUTPATIENT)
Dept: INTERNAL MEDICINE | Facility: CLINIC | Age: 74
End: 2024-02-09

## 2024-02-09 DIAGNOSIS — E78.5 HYPERLIPIDEMIA, UNSPECIFIED HYPERLIPIDEMIA TYPE: ICD-10-CM

## 2024-02-09 DIAGNOSIS — E66.01 MORBID OBESITY: ICD-10-CM

## 2024-02-09 DIAGNOSIS — I70.235 ATHEROSCLEROSIS OF NATIVE ARTERIES OF RIGHT LEG WITH ULCERATION OF OTHER PART OF FOOT: ICD-10-CM

## 2024-02-09 PROCEDURE — 1160F RVW MEDS BY RX/DR IN RCRD: CPT | Mod: HCNC,CPTII,95, | Performed by: INTERNAL MEDICINE

## 2024-02-09 PROCEDURE — 99214 OFFICE O/P EST MOD 30 MIN: CPT | Mod: HCNC,95,, | Performed by: INTERNAL MEDICINE

## 2024-02-09 PROCEDURE — 4010F ACE/ARB THERAPY RXD/TAKEN: CPT | Mod: HCNC,CPTII,95, | Performed by: INTERNAL MEDICINE

## 2024-02-09 PROCEDURE — 1159F MED LIST DOCD IN RCRD: CPT | Mod: HCNC,CPTII,95, | Performed by: INTERNAL MEDICINE

## 2024-02-09 RX ORDER — ROSUVASTATIN CALCIUM 5 MG/1
5 TABLET, COATED ORAL DAILY
Qty: 90 TABLET | Refills: 11 | Status: SHIPPED | OUTPATIENT
Start: 2024-02-09 | End: 2024-06-14

## 2024-02-09 RX ORDER — EZETIMIBE 10 MG/1
10 TABLET ORAL DAILY
COMMUNITY
End: 2024-02-09

## 2024-02-09 NOTE — PROGRESS NOTES
Subjective:       Patient ID: Ana Patel is a 74 y.o. female.    Chief Complaint: No chief complaint on file.    The patient location is: home in the state of [place]    The chief complaint leading to consultation is: Patient is here for followup for chronic conditions.      Visit type: audiovisual  Total time spent with patient: 15  Each patient to whom he or she provides medical services by telemedicine is:  (1) informed of the relationship between the physician and patient and the respective role of any other health care provider with respect to management of the patient; and (2) notified that he or she may decline to receive medical services by telemedicine and may withdraw from such care at any time.    Notes:     Feeling OK    Progressing with home PT, still not walking, but able to stand with walk for 40 sec at a time.    Ankle that was fxed is doing better.    Both knees -- source of most of her pains. Tramadol helps, usu takes on PT days.    Still chronic swelling L leg especially. She has compression stockings on all the time.      Review of Systems   Constitutional:  Negative for activity change and unexpected weight change.   HENT:  Negative for hearing loss, rhinorrhea and trouble swallowing.    Eyes:  Negative for discharge and visual disturbance.   Respiratory:  Negative for chest tightness and wheezing.    Cardiovascular:  Negative for chest pain and palpitations.   Gastrointestinal:  Negative for blood in stool, constipation, diarrhea and vomiting.   Endocrine: Negative for polydipsia and polyuria.   Genitourinary:  Negative for difficulty urinating, dysuria, hematuria and menstrual problem.   Musculoskeletal:  Positive for arthralgias. Negative for joint swelling and neck pain.   Neurological:  Negative for weakness and headaches.   Psychiatric/Behavioral:  Negative for confusion and dysphoric mood.            Objective:      Physical Exam  Constitutional:       General: She is not in  acute distress.     Appearance: Normal appearance. She is well-developed. She is obese. She is not ill-appearing, toxic-appearing or diaphoretic.      Comments: Well appearing, breathing comfortably, speaking full sentences, no coughing during telemed encounter     Pulmonary:      Effort: Pulmonary effort is normal. No respiratory distress.   Skin:     Findings: No rash.   Neurological:      Mental Status: She is alert and oriented to person, place, and time.   Psychiatric:         Behavior: Behavior normal.         Thought Content: Thought content normal.         Judgment: Judgment normal.         Assessment:       1. Morbid obesity    2. Atherosclerosis of native arteries of right leg with ulceration of other part of foot    3. Hyperlipidemia, unspecified hyperlipidemia type        Plan:       Diagnoses and all orders for this visit:    Morbid obesity  -     Ambulatory referral/consult to Bariatric/Obesity Medicine; Future  Explained that I cannot rx ozempic like wt loss meds w/o known dm2    Atherosclerosis of native arteries of right leg with ulceration of other part of foot  -     rosuvastatin (CRESTOR) 5 MG tablet; Take 1 tablet (5 mg total) by mouth once daily.  Hyperlipidemia, unspecified hyperlipidemia type  -     rosuvastatin (CRESTOR) 5 MG tablet; Take 1 tablet (5 mg total) by mouth once daily.  Has been off -- restart and OK to stop zetia  Medications Discontinued During This Encounter   Medication Reason    ezetimibe (ZETIA) 10 mg tablet            Health Maintenance         Date Due Completion Date    RSV Vaccine (Age 60+ and Pregnant patients) (1 - 1-dose 60+ series) Never done ---    DEXA Scan 10/09/2020 10/9/2017    Shingles Vaccine (3 of 3) 11/20/2020 9/25/2020    Influenza Vaccine (1) 09/01/2023 10/17/2022    COVID-19 Vaccine (11 - 2023-24 season) 09/01/2023 10/27/2022    Mammogram 01/17/2024 1/17/2023    Aspirin/Antiplatelet Therapy 08/23/2024 8/23/2023    High Dose Statin 02/09/2025 2/9/2024     Colorectal Cancer Screening 11/09/2026 11/9/2023    Lipid Panel 03/29/2028 3/29/2023    TETANUS VACCINE 11/10/2031 11/10/2021    Override on 8/24/2017: Declined   Discussed again + cologuard, she declines colo at this time or other screenings, especially since too difficult to leave the home (which validates need for continues home health)         Follow up in about 6 months (around 8/9/2024).    No future appointments.

## 2024-02-09 NOTE — TELEPHONE ENCOUNTER
Patient stated she is unable to walk and would have to ask her  if  he could take her to the nearest location which is the [facility]

## 2024-02-09 NOTE — TELEPHONE ENCOUNTER
Hi, please offer her a 6 month followup and assistance with bariatric medicine referral. Thank you, Gordo Naik

## 2024-02-28 ENCOUNTER — TELEPHONE (OUTPATIENT)
Dept: INTERNAL MEDICINE | Facility: CLINIC | Age: 74
End: 2024-02-28
Payer: MEDICARE

## 2024-02-28 DIAGNOSIS — M25.562 BILATERAL ANTERIOR KNEE PAIN: Primary | ICD-10-CM

## 2024-02-28 DIAGNOSIS — M25.561 BILATERAL ANTERIOR KNEE PAIN: Primary | ICD-10-CM

## 2024-02-28 DIAGNOSIS — M19.90 OSTEOARTHRITIS, UNSPECIFIED OSTEOARTHRITIS TYPE, UNSPECIFIED SITE: ICD-10-CM

## 2024-02-28 NOTE — TELEPHONE ENCOUNTER
Hi, please contact the patient to assist in scheduling    Orders Placed This Encounter    Ambulatory referral/consult to Orthopedics       Thank you, Gordo Naik      Message from :    Hey Doc. Home theropy has ended. Can you refer her to a specialist. We need theropy and possible knee replacement. Your assistance is appreciated

## 2024-02-29 NOTE — TELEPHONE ENCOUNTER
Hi, please contact the patient to assist in scheduling         Orders Placed This Encounter    Ambulatory referral/consult to Orthopedics         Thank you, Gordo Naik

## 2024-03-01 NOTE — TELEPHONE ENCOUNTER
Spoke with she states the clinics are to far out for her. She wants to wait and will give us a call back when she is ready to schedule

## 2024-03-04 DIAGNOSIS — M19.90 OSTEOARTHRITIS, UNSPECIFIED OSTEOARTHRITIS TYPE, UNSPECIFIED SITE: ICD-10-CM

## 2024-03-04 DIAGNOSIS — I73.9 PAD (PERIPHERAL ARTERY DISEASE): ICD-10-CM

## 2024-03-04 RX ORDER — TRAMADOL HYDROCHLORIDE 50 MG/1
50 TABLET ORAL EVERY 12 HOURS PRN
Qty: 60 TABLET | Refills: 2 | Status: SHIPPED | OUTPATIENT
Start: 2024-03-04 | End: 2024-04-30 | Stop reason: SDUPTHER

## 2024-03-04 NOTE — TELEPHONE ENCOUNTER
Care Due:                  Date            Visit Type   Department     Provider  --------------------------------------------------------------------------------                                ESTABLISHED                              PATIENT -    [facility] INTERNAL  Last Visit: 11-      [facility]      MEDICINE       Gordo Naik                               -                              PRIMARY      [facility] INTERNAL  Next Visit: 09-      CARE ([facility])   MEDICINE       Gordo Naik                                                            Last  Test          Frequency    Reason                     Performed    Due Date  --------------------------------------------------------------------------------    CBC.........  12 months..  clopidogreL..............  03- 03-    CMP.........  12 months..  furosemide, lisinopriL,    03- 03-                             rosuvastatin.............    Lipid Panel.  12 months..  rosuvastatin.............  03- 03-    [facility] Embedded Care Due Messages. Reference number: [id number].   3/04/2024 2:28:29 PM CST

## 2024-04-01 ENCOUNTER — PATIENT MESSAGE (OUTPATIENT)
Dept: INTERNAL MEDICINE | Facility: CLINIC | Age: 74
End: 2024-04-01
Payer: MEDICARE

## 2024-04-01 NOTE — TELEPHONE ENCOUNTER
No care due was identified.  [facility] Embedded Care Due Messages. Reference number: [id number].   4/01/2024 2:08:35 AM CDT

## 2024-04-02 ENCOUNTER — TELEPHONE (OUTPATIENT)
Dept: INTERNAL MEDICINE | Facility: CLINIC | Age: 74
End: 2024-04-02
Payer: MEDICARE

## 2024-04-02 ENCOUNTER — ON-DEMAND VIRTUAL (OUTPATIENT)
Dept: URGENT CARE | Facility: CLINIC | Age: 74
End: 2024-04-02
Payer: MEDICARE

## 2024-04-02 ENCOUNTER — NURSE TRIAGE (OUTPATIENT)
Dept: ADMINISTRATIVE | Facility: CLINIC | Age: 74
End: 2024-04-02
Payer: MEDICARE

## 2024-04-02 DIAGNOSIS — N39.0 URINARY TRACT INFECTION WITHOUT HEMATURIA, SITE UNSPECIFIED: Primary | ICD-10-CM

## 2024-04-02 PROCEDURE — 99203 OFFICE O/P NEW LOW 30 MIN: CPT | Mod: 95,,, | Performed by: FAMILY MEDICINE

## 2024-04-02 RX ORDER — NITROFURANTOIN 25; 75 MG/1; MG/1
100 CAPSULE ORAL 2 TIMES DAILY
Qty: 14 CAPSULE | Refills: 0 | Status: SHIPPED | OUTPATIENT
Start: 2024-04-02 | End: 2024-04-09

## 2024-04-02 RX ORDER — EZETIMIBE 10 MG/1
10 TABLET ORAL
Qty: 90 TABLET | Refills: 1 | Status: SHIPPED | OUTPATIENT
Start: 2024-04-02

## 2024-04-02 RX ORDER — CLOPIDOGREL BISULFATE 75 MG/1
75 TABLET ORAL
Qty: 90 TABLET | Refills: 1 | Status: SHIPPED | OUTPATIENT
Start: 2024-04-02

## 2024-04-02 NOTE — TELEPHONE ENCOUNTER
----- Message from Amada Barton sent at 4/2/2024  3:47 PM CDT -----  Contact: Chikis (daughter) [phone]  Would like to receive medical advice.    Would they like a call back or a response via [facility]:  call back    Additional information:  Ana's daughter is calling to get a state update on the pt's UTI antibiotics that needs to be sent to the pharmacy. Pt's daughter states they sent a message last night on the portal and today @ 9am with an agent. Please call the pt's daughter back for advice       [id number] - [place], [place] - [address] [place] [address]  [place] [address]  [facility] [place] [address]  Phone: [phone] Fax: [phone]      Chikis states this is an urgent message

## 2024-04-02 NOTE — PROGRESS NOTES
Subjective:      Patient ID: Ana Patel is a 74 y.o. female.    Vitals:  vitals were not taken for this visit.     Chief Complaint: PPD Placement (dysu) and Dysuria (3 days )      Visit Type: TELE AUDIOVISUAL    Present with the patient at the time of consultation: TELEMED PRESENT WITH PATIENT: None    Past Medical History:   Diagnosis Date    Eczema     Hyperlipidemia     Hypertension     Necrobiosis lipoidica     Obesity     Osteoarthritis     Varicose vein of leg      Past Surgical History:   Procedure Laterality Date    ANGIOGRAPHY OF LOWER EXTREMITY Right 3/8/2022    Procedure: Angiogram Extremity Unilateral;  Surgeon: Eliud Garrido MD;  Location: [facility] OR [address] [place] [address];  Service: Vascular;  Laterality: Right;  48mL Contrast dye  417.58 mGy  138.57 Gycm2  18.7 minutes fluoroscopy time.    CYST REMOVAL Right     PERCUTANEOUS TRANSLUMINAL ANGIOPLASTY Right 3/8/2022    Procedure: PTA (ANGIOPLASTY, PERCUTANEOUS, TRANSLUMINAL);  Surgeon: Eliud Garrido MD;  Location: [facility] OR [address] [place] [address];  Service: Vascular;  Laterality: Right;  SFA    TONSILLECTOMY, ADENOIDECTOMY       Review of patient's allergies indicates:   Allergen Reactions    Sulfa (sulfonamide antibiotics) Hives and Rash    Pravastatin Other (See Comments)     Muscle cramps  Muscle cramps    Oxycodone Other (See Comments)     Restless, poor sleep    Amoxicillin Rash    Ciprofloxacin Rash    Pcn [penicillins] Rash     Current Outpatient Medications on File Prior to Visit   Medication Sig Dispense Refill    acetaminophen (TYLENOL) 500 mg Cap       amLODIPine (NORVASC) 10 MG tablet Take 1 tablet (10 mg total) by mouth once daily. 90 tablet 11    clopidogreL (PLAVIX) 75 mg tablet TAKE 1 TABLET ONE TIME DAILY 90 tablet 1    ezetimibe (ZETIA) 10 mg tablet TAKE 1 TABLET ONE TIME DAILY 90 tablet 1    fexofenadine (ALLEGRA) 180 MG tablet TAKE 1 TABLET EVERY DAY 90 tablet 3    hydroxychloroquine (PLAQUENIL) 200 mg tablet TAKE 1 TABLET EVERY DAY 90 tablet 10     lisinopriL (PRINIVIL,ZESTRIL) 20 MG tablet Take 1 tablet (20 mg total) by mouth once daily. 90 tablet 1    nicotine (NICODERM CQ) 7 mg/24 hr Place 1 patch onto the skin once daily. 14 patch 11    rosuvastatin (CRESTOR) 5 MG tablet Take 1 tablet (5 mg total) by mouth once daily. 90 tablet 11    traMADoL (ULTRAM) 50 mg tablet Take 1 tablet (50 mg total) by mouth every 12 (twelve) hours as needed for Pain. 60 tablet 2    triamcinolone acetonide 0.1% (KENALOG) 0.1 % ointment APPLY TOPICALLY ON RASH AT LEGS TWO TIMES DAILY AS NEEDED 454 g 5    benzocaine 20 % Liqd       FLUZONE HIGH-DOSE 2018-19, PF, 180 mcg/0.5 mL vaccine ADM 0.5ML IM UTD  0    furosemide (LASIX) 20 MG tablet TAKE 2 TABLETS ONE TIME DAILY (Patient not taking: Reported on 4/2/2024) 180 tablet 1    multivitamin capsule Take 1 capsule by mouth once daily.      [DISCONTINUED] clopidogreL (PLAVIX) 75 mg tablet Take 1 tablet (75 mg total) by mouth once daily. 90 tablet 1     No current facility-administered medications on file prior to visit.     Family History   Adopted: Yes   Problem Relation Age of Onset    No Known Problems Daughter     No Known Problems Son        Medications Ordered                [id number] - [place]  [facility] [place] [address] [facility] [address]   [facility] [place] [address]    Telephone: [phone]   Fax: [phone]   Hours: Not open 24 hours                         E-Prescribed (1 of 1)              nitrofurantoin, macrocrystal-monohydrate, (MACROBID) 100 MG capsule    Sig: Take 1 capsule (100 mg total) by mouth 2 (two) times daily. for 7 days       Start: 4/2/24     Quantity: 14 capsule Refills: 0                           Ohs Peq Odvv Intake    4/2/2024  5:14 PM CDT - Filed by Jericho Patel (Proxy)   What is your current physical address in the event of a medical emergency? [address]   Are you able to take your vital signs? No   Please attach any relevant images or files           Patient has a dysuria urgency frequency for 3 days, no nauseous no vomiting no flank pain, no history for kidney stones kidney infection, last UTI was last year June  patient is allergic to a lot of antibiotics, no blood in the urine, no chills no fever no abdominal pain, her daughter present     Dysuria   Associated symptoms include frequency and urgency. Pertinent negatives include no flank pain or hematuria.       Constitution: Negative for fever.   Gastrointestinal:  Negative for abdominal bloating.   Genitourinary:  Positive for dysuria, frequency and urgency. Negative for flank pain and hematuria.        Objective:   The physical exam was conducted virtually.  Physical Exam   Constitutional: She is oriented to person, place, and time.   HENT:   Head: Normocephalic and atraumatic.   Eyes: Conjunctivae are normal.   Pulmonary/Chest: Effort normal. No respiratory distress.   Abdominal: Normal appearance. There is no abdominal tenderness.   Neurological: no focal deficit. She is alert and oriented to person, place, and time.   Skin: Skin is no rash.   Psychiatric: Mood, judgment and thought content normal.       Assessment:     1. Urinary tract infection without hematuria, site unspecified        Plan:       Urinary tract infection without hematuria, site unspecified  -     nitrofurantoin, macrocrystal-monohydrate, (MACROBID) 100 MG capsule; Take 1 capsule (100 mg total) by mouth 2 (two) times daily. for 7 days  Dispense: 14 capsule; Refill: 0      Drink a lot of fluid. If no improving in 3 days see md.

## 2024-04-02 NOTE — TELEPHONE ENCOUNTER
Refill Routing Note   Medication(s) are not appropriate for processing by [facility] for the following reason(s):        Required labs outdated    [facility] action(s):  Defer             Appointments  past 12m or future 3m with PCP    Date Provider   Last Visit   2/9/2024 Gordo Naik MD   Next Visit   9/5/2024 Gordo Naik MD   ED visits in past 90 days: 0        Note composed:6:16 AM 04/02/2024

## 2024-04-02 NOTE — TELEPHONE ENCOUNTER
Daughter calling on behalf of patient who is present. Reports they were trying to get a virtual visit but it was giving them an error. Symptoms are bad/foul smelling urine w/ dysuria. Information given on [facility] and they will utilize this. Will call back w any questions/concerns.     Reason for Disposition   Bad or foul-smelling urine    Additional Information   Negative: Shock suspected (e.g., cold/pale/clammy skin, too weak to stand, low BP, rapid pulse)   Negative: Sounds like a life-threatening emergency to the triager   Negative: [1] Unable to urinate (or only a few drops) > 4 hours AND [2] bladder feels very full (e.g., palpable bladder or strong urge to urinate)   Negative: [1] Decreased urination and [2] drinking very little AND [2] dehydration suspected (e.g., dark urine, no urine > 12 hours, very dry mouth, very lightheaded)   Negative: Patient sounds very sick or weak to the triager   Negative: Fever > 100.4 F (38.0 C)    Protocols used: Urinary Symptoms-A-AH

## 2024-04-02 NOTE — TELEPHONE ENCOUNTER
----- Message from Teodoro Grant MA sent at 4/2/2024  9:39 AM CDT -----  Contact: Daughter in law [phone]    ----- Message -----  From: Anne Mary  Sent: 4/2/2024   9:39 AM CDT  To: [facility] Caro Staff    Would like to receive medical advice.    Pharmacy name/number (copy/paste from chart):      [id number] - [place] - [facility] [place] [address] AT [facility] & [place] [address] [place] [address]  Phone: [phone] Fax: [phone]    Would they like a call back or a response via [facility]:  call back    Additional information:  Calling to get antibiotic put in for the above pt. Daughter inn law states that pt has a really bad UTI

## 2024-04-02 NOTE — TELEPHONE ENCOUNTER
Spoke with pt daughter and she stated she didn't know that her mom spoke with us earlier I let her know I explained e visit to her mom, trung.

## 2024-04-02 NOTE — TELEPHONE ENCOUNTER
Hi please  call the patient and let the patient know I sent in E visit invitation so that the patient can start an E visit encounter on the [facility]'s [facility].  Thank you, Gordo Naik

## 2024-04-03 ENCOUNTER — TELEPHONE (OUTPATIENT)
Dept: INTERNAL MEDICINE | Facility: CLINIC | Age: 74
End: 2024-04-03
Payer: MEDICARE

## 2024-04-03 NOTE — TELEPHONE ENCOUNTER
Pt states that call was from last night, she had a virtual with urgent and was abl to get a prescription

## 2024-04-05 DIAGNOSIS — I10 ESSENTIAL HYPERTENSION: ICD-10-CM

## 2024-04-05 NOTE — TELEPHONE ENCOUNTER
No care due was identified.  [facility] Embedded Care Due Messages. Reference number: [id number].   4/05/2024 10:32:31 AM CDT

## 2024-04-06 NOTE — TELEPHONE ENCOUNTER
Refill Routing Note   Medication(s) are not appropriate for processing by [facility] for the following reason(s):        Required vitals outdated  Required vitals abnormal  Required labs outdated    [facility] action(s):  Defer               Appointments  past 12m or future 3m with PCP    Date Provider   Last Visit   2/9/2024 Gordo Naik MD   Next Visit   4/8/2024 Gordo Naik MD   ED visits in past 90 days: 0        Note composed:9:11 AM 04/06/2024

## 2024-04-08 ENCOUNTER — PATIENT MESSAGE (OUTPATIENT)
Dept: INTERNAL MEDICINE | Facility: CLINIC | Age: 74
End: 2024-04-08

## 2024-04-08 ENCOUNTER — LAB VISIT (OUTPATIENT)
Dept: LAB | Facility: HOSPITAL | Age: 74
End: 2024-04-08
Attending: INTERNAL MEDICINE
Payer: MEDICARE

## 2024-04-08 ENCOUNTER — OFFICE VISIT (OUTPATIENT)
Dept: INTERNAL MEDICINE | Facility: CLINIC | Age: 74
End: 2024-04-08
Payer: MEDICARE

## 2024-04-08 VITALS
OXYGEN SATURATION: 94 % | HEIGHT: 72 IN | DIASTOLIC BLOOD PRESSURE: 80 MMHG | HEART RATE: 71 BPM | BODY MASS INDEX: 39.68 KG/M2 | WEIGHT: 293 LBS | SYSTOLIC BLOOD PRESSURE: 130 MMHG

## 2024-04-08 DIAGNOSIS — E66.01 MORBID OBESITY: ICD-10-CM

## 2024-04-08 DIAGNOSIS — I73.9 PAD (PERIPHERAL ARTERY DISEASE): Primary | ICD-10-CM

## 2024-04-08 DIAGNOSIS — R73.9 HYPERGLYCEMIA: ICD-10-CM

## 2024-04-08 DIAGNOSIS — R62.7 FTT (FAILURE TO THRIVE) IN ADULT: ICD-10-CM

## 2024-04-08 DIAGNOSIS — E78.5 HYPERLIPIDEMIA, UNSPECIFIED HYPERLIPIDEMIA TYPE: ICD-10-CM

## 2024-04-08 DIAGNOSIS — I73.9 PAD (PERIPHERAL ARTERY DISEASE): ICD-10-CM

## 2024-04-08 LAB
ALBUMIN SERPL BCP-MCNC: 4.1 G/DL (ref 3.5–5.2)
ALP SERPL-CCNC: 57 U/L (ref 55–135)
ALT SERPL W/O P-5'-P-CCNC: 13 U/L (ref 10–44)
ANION GAP SERPL CALC-SCNC: 12 MMOL/L (ref 8–16)
AST SERPL-CCNC: 16 U/L (ref 10–40)
BILIRUB SERPL-MCNC: 0.5 MG/DL (ref 0.1–1)
BUN SERPL-MCNC: 13 MG/DL (ref 8–23)
CALCIUM SERPL-MCNC: 10.3 MG/DL (ref 8.7–10.5)
CHLORIDE SERPL-SCNC: 105 MMOL/L (ref 95–110)
CHOLEST SERPL-MCNC: 128 MG/DL (ref 120–199)
CHOLEST/HDLC SERPL: 2.2 {RATIO} (ref 2–5)
CO2 SERPL-SCNC: 22 MMOL/L (ref 23–29)
CREAT SERPL-MCNC: 0.8 MG/DL (ref 0.5–1.4)
EST. GFR  (NO RACE VARIABLE): >60 ML/MIN/1.73 M^2
ESTIMATED AVG GLUCOSE: 103 MG/DL (ref 68–131)
GLUCOSE SERPL-MCNC: 102 MG/DL (ref 70–110)
HBA1C MFR BLD: 5.2 % (ref 4–5.6)
HDLC SERPL-MCNC: 59 MG/DL (ref 40–75)
HDLC SERPL: 46.1 % (ref 20–50)
LDLC SERPL CALC-MCNC: 51.2 MG/DL (ref 63–159)
NONHDLC SERPL-MCNC: 69 MG/DL
POTASSIUM SERPL-SCNC: 3.8 MMOL/L (ref 3.5–5.1)
PROT SERPL-MCNC: 8.2 G/DL (ref 6–8.4)
SODIUM SERPL-SCNC: 139 MMOL/L (ref 136–145)
TRIGL SERPL-MCNC: 89 MG/DL (ref 30–150)

## 2024-04-08 PROCEDURE — 3075F SYST BP GE 130 - 139MM HG: CPT | Mod: HCNC,CPTII,S$GLB, | Performed by: INTERNAL MEDICINE

## 2024-04-08 PROCEDURE — 99215 OFFICE O/P EST HI 40 MIN: CPT | Mod: HCNC,S$GLB,, | Performed by: INTERNAL MEDICINE

## 2024-04-08 PROCEDURE — 3288F FALL RISK ASSESSMENT DOCD: CPT | Mod: HCNC,CPTII,S$GLB, | Performed by: INTERNAL MEDICINE

## 2024-04-08 PROCEDURE — 80053 COMPREHEN METABOLIC PANEL: CPT | Mod: HCNC | Performed by: INTERNAL MEDICINE

## 2024-04-08 PROCEDURE — 4010F ACE/ARB THERAPY RXD/TAKEN: CPT | Mod: HCNC,CPTII,S$GLB, | Performed by: INTERNAL MEDICINE

## 2024-04-08 PROCEDURE — 83036 HEMOGLOBIN GLYCOSYLATED A1C: CPT | Mod: HCNC | Performed by: INTERNAL MEDICINE

## 2024-04-08 PROCEDURE — 1126F AMNT PAIN NOTED NONE PRSNT: CPT | Mod: HCNC,CPTII,S$GLB, | Performed by: INTERNAL MEDICINE

## 2024-04-08 PROCEDURE — 3008F BODY MASS INDEX DOCD: CPT | Mod: HCNC,CPTII,S$GLB, | Performed by: INTERNAL MEDICINE

## 2024-04-08 PROCEDURE — 3079F DIAST BP 80-89 MM HG: CPT | Mod: HCNC,CPTII,S$GLB, | Performed by: INTERNAL MEDICINE

## 2024-04-08 PROCEDURE — 1160F RVW MEDS BY RX/DR IN RCRD: CPT | Mod: HCNC,CPTII,S$GLB, | Performed by: INTERNAL MEDICINE

## 2024-04-08 PROCEDURE — 1159F MED LIST DOCD IN RCRD: CPT | Mod: HCNC,CPTII,S$GLB, | Performed by: INTERNAL MEDICINE

## 2024-04-08 PROCEDURE — 1101F PT FALLS ASSESS-DOCD LE1/YR: CPT | Mod: HCNC,CPTII,S$GLB, | Performed by: INTERNAL MEDICINE

## 2024-04-08 PROCEDURE — 36415 COLL VENOUS BLD VENIPUNCTURE: CPT | Mod: HCNC | Performed by: INTERNAL MEDICINE

## 2024-04-08 PROCEDURE — 99999 PR PBB SHADOW E&M-EST. PATIENT-LVL IV: CPT | Mod: PBBFAC,HCNC,, | Performed by: INTERNAL MEDICINE

## 2024-04-08 PROCEDURE — 80061 LIPID PANEL: CPT | Mod: HCNC | Performed by: INTERNAL MEDICINE

## 2024-04-08 PROCEDURE — 85025 COMPLETE CBC W/AUTO DIFF WBC: CPT | Mod: HCNC | Performed by: INTERNAL MEDICINE

## 2024-04-08 RX ORDER — LISINOPRIL 20 MG/1
TABLET ORAL
Qty: 90 TABLET | Refills: 11 | Status: SHIPPED | OUTPATIENT
Start: 2024-04-08

## 2024-04-08 NOTE — PROGRESS NOTES
Subjective:       Patient ID: Ana Patel is a 74 y.o. female.    Chief Complaint: Urinary Tract Infection    Patient is here for followup for chronic conditions.    UTI symptoms have improved significantly since on bactrim.    Has been bedridden x2 yrs.    Has bed sores, which bleed at times.    Family here -- dtr and her brother. They are very concerned that patient is not getting right care at home given that she has failed to progress -- cannot walk, does not get enough frequent diaper changes.  Family is worried since she has had frequent UTIs.  She has hired help an aide comes by about once a week and her  base her once a week as well.  Her daughter lives in [place] and is here visiting.      Review of Systems   Constitutional:  Positive for activity change. Negative for fever and unexpected weight change.   Respiratory:  Negative for cough and shortness of breath.    Cardiovascular:  Positive for leg swelling. Negative for chest pain and palpitations.   Gastrointestinal:  Negative for abdominal distention, abdominal pain, nausea and vomiting.   Genitourinary:  Negative for decreased urine volume and dysuria (resolved).   Skin:  Positive for wound. Negative for rash.   Neurological:  Positive for weakness (diffuse, especially lower extremities). Negative for syncope.   Psychiatric/Behavioral:  Negative for confusion and dysphoric mood.            Objective:      Physical Exam  Vitals reviewed.   Constitutional:       General: She is not in acute distress.     Appearance: Normal appearance. She is well-developed. She is not ill-appearing, toxic-appearing or diaphoretic.      Comments: Here with supportive sister, brother, sister-in-law.  She is in a wheelchair and can not get up.   HENT:      Head: Normocephalic and atraumatic.   Eyes:      General: No scleral icterus.     Pupils: Pupils are equal, round, and reactive to light.   Neck:      Thyroid: No thyromegaly.   Cardiovascular:      Rate  and Rhythm: Normal rate and regular rhythm.      Heart sounds: Normal heart sounds. No murmur heard.     No friction rub. No gallop.   Pulmonary:      Effort: Pulmonary effort is normal. No respiratory distress.      Breath sounds: Normal breath sounds. No wheezing or rales.   Abdominal:      General: Bowel sounds are normal. There is no distension.      Palpations: Abdomen is soft. There is no mass.      Tenderness: There is no abdominal tenderness. There is no guarding or rebound.   Genitourinary:     Comments: Daughter and I had her sit up in wheelchair I could not see all the way into gluteal cleft I do not see an obvious bedsore although this was a limited exam.  Musculoskeletal:         General: No tenderness. Normal range of motion.      Cervical back: Normal range of motion.      Right lower leg: Edema present.      Left lower leg: Edema present.      Comments: Bilateral legs are in stockings, no sores in either lower leg.   Lymphadenopathy:      Cervical: No cervical adenopathy.   Neurological:      General: No focal deficit present.      Mental Status: She is alert and oriented to person, place, and time.   Psychiatric:         Mood and Affect: Mood normal.         Speech: Speech normal.         Behavior: Behavior normal.         Assessment:       1. PAD (peripheral artery disease)    2. Morbid obesity    3. Failure to thrive (child)    4. Hyperglycemia    5. Hyperlipidemia, unspecified hyperlipidemia type        Plan:       Ana was seen today for urinary tract infection.    Diagnoses and all orders for this visit:      I had a yaa conversation with patient and family.  I explained that at this point the next step would either be daily home aide versus assisted living facility move.  Patient and family were aware that these are the 2 best options at this time.  Daughter will research local assisted living facilities.  We will also refer to case management.  Her UTI symptoms seem to have  resolved.    Patient is still recognizes her  as her healthcare proxy in case she is not able to make healthcare decisions.  I also emailed them advanced directives forms to complete as a family.    PAD (peripheral artery disease)  -     CBC Auto Differential; Future  -     Comprehensive Metabolic Panel; Future  -     Lipid Panel; Future  -     Hemoglobin A1C; Future    Morbid obesity  -     CBC Auto Differential; Future  -     Comprehensive Metabolic Panel; Future    Failure to thrive  -     Ambulatory referral/consult to Outpatient Case Management    Hyperglycemia  -     Hemoglobin A1C; Future    Hyperlipidemia, unspecified hyperlipidemia type  -     Lipid Panel; Future    Over 40 minutes spent with patient and majority in counseling and patient education.      Health Maintenance         Date Due Completion Date    RSV Vaccine (Age 60+ and Pregnant patients) (1 - 1-dose 60+ series) Never done ---    DEXA Scan 10/09/2020 10/9/2017    Shingles Vaccine (3 of 3) 11/20/2020 9/25/2020    Influenza Vaccine (1) 09/01/2023 10/17/2022    COVID-19 Vaccine (11 - 2023-24 season) 09/01/2023 10/27/2022    Mammogram 01/17/2024 1/17/2023    Aspirin/Antiplatelet Therapy 04/02/2025 4/2/2024    Colorectal Cancer Screening 11/09/2026 11/9/2023    Lipid Panel 03/29/2028 3/29/2023    TETANUS VACCINE 11/10/2031 11/10/2021    Override on 8/24/2017: Declined   We both agreed to not pursue mammogram and colon cancer screening at this time due to her FTT.         Follow up in about 6 weeks (around 5/20/2024) for Covid and shingles please.    Future Appointments   Date Time Provider Department Center   4/24/2024  9:30 AM Michael Clement MD [facility]   5/20/2024  2:20 PM Gordo Naik MD [facility]   9/5/2024 11:00 AM Gordo Naik MD [facility]

## 2024-04-09 ENCOUNTER — TELEPHONE (OUTPATIENT)
Dept: INTERNAL MEDICINE | Facility: CLINIC | Age: 74
End: 2024-04-09
Payer: MEDICARE

## 2024-04-09 LAB
ANISOCYTOSIS BLD QL SMEAR: SLIGHT
BASOPHILS # BLD AUTO: 0.05 K/UL (ref 0–0.2)
BASOPHILS NFR BLD: 0.5 % (ref 0–1.9)
DIFFERENTIAL METHOD BLD: ABNORMAL
EOSINOPHIL # BLD AUTO: 0 K/UL (ref 0–0.5)
EOSINOPHIL NFR BLD: 0.4 % (ref 0–8)
ERYTHROCYTE [DISTWIDTH] IN BLOOD BY AUTOMATED COUNT: 15.8 % (ref 11.5–14.5)
HCT VFR BLD AUTO: 37.8 % (ref 37–48.5)
HGB BLD-MCNC: 12.1 G/DL (ref 12–16)
IMM GRANULOCYTES # BLD AUTO: 0.1 K/UL (ref 0–0.04)
IMM GRANULOCYTES NFR BLD AUTO: 0.9 % (ref 0–0.5)
LYMPHOCYTES # BLD AUTO: 1.5 K/UL (ref 1–4.8)
LYMPHOCYTES NFR BLD: 14.1 % (ref 18–48)
MCH RBC QN AUTO: 30.8 PG (ref 27–31)
MCHC RBC AUTO-ENTMCNC: 32 G/DL (ref 32–36)
MCV RBC AUTO: 96 FL (ref 82–98)
MONOCYTES # BLD AUTO: 3.7 K/UL (ref 0.3–1)
MONOCYTES NFR BLD: 34 % (ref 4–15)
NEUTROPHILS # BLD AUTO: 5.5 K/UL (ref 1.8–7.7)
NEUTROPHILS NFR BLD: 50.1 % (ref 38–73)
NRBC BLD-RTO: 0 /100 WBC
PLATELET # BLD AUTO: 120 K/UL (ref 150–450)
PLATELET BLD QL SMEAR: ABNORMAL
PMV BLD AUTO: 12.8 FL (ref 9.2–12.9)
RBC # BLD AUTO: 3.93 M/UL (ref 4–5.4)
WBC # BLD AUTO: 10.96 K/UL (ref 3.9–12.7)

## 2024-04-09 NOTE — TELEPHONE ENCOUNTER
----- Message from Marilyn Clarke sent at 4/9/2024 12:53 PM CDT -----  Contact: [phone]  [email or web address]     Patient is calling for Medical Advice regarding:paper work for the office     How long has patient had these symptoms:    Pharmacy name and phone#:    Would like response via [facility]:  no     Comments:  Pts daughter is in the waiting room she has some POA papers she is needing to give you all

## 2024-04-15 ENCOUNTER — OUTPATIENT CASE MANAGEMENT (OUTPATIENT)
Dept: ADMINISTRATIVE | Facility: OTHER | Age: 74
End: 2024-04-15
Payer: MEDICARE

## 2024-04-16 ENCOUNTER — TELEPHONE (OUTPATIENT)
Dept: INTERNAL MEDICINE | Facility: CLINIC | Age: 74
End: 2024-04-16
Payer: MEDICARE

## 2024-04-30 DIAGNOSIS — I73.9 PAD (PERIPHERAL ARTERY DISEASE): ICD-10-CM

## 2024-04-30 DIAGNOSIS — M19.90 OSTEOARTHRITIS, UNSPECIFIED OSTEOARTHRITIS TYPE, UNSPECIFIED SITE: ICD-10-CM

## 2024-04-30 RX ORDER — TRAMADOL HYDROCHLORIDE 50 MG/1
50 TABLET ORAL EVERY 12 HOURS PRN
Qty: 60 TABLET | Refills: 2 | Status: SHIPPED | OUTPATIENT
Start: 2024-04-30 | End: 2024-05-29 | Stop reason: SDUPTHER

## 2024-04-30 NOTE — TELEPHONE ENCOUNTER
No care due was identified.  [facility] Embedded Care Due Messages. Reference number: [id number].   4/30/2024 12:59:00 PM CDT

## 2024-05-22 RX ORDER — MINERAL OIL
180 ENEMA (ML) RECTAL DAILY
Qty: 90 TABLET | Refills: 3 | Status: SHIPPED | OUTPATIENT
Start: 2024-05-22

## 2024-05-22 NOTE — TELEPHONE ENCOUNTER
No care due was identified.  [facility] Embedded Care Due Messages. Reference number: [id number].   5/22/2024 11:06:16 AM CDT

## 2024-05-22 NOTE — TELEPHONE ENCOUNTER
Refill Decision Note   Ana Patel  is requesting a refill authorization.  Brief Assessment and Rationale for Refill:  Approve     Medication Therapy Plan:         Comments:     Note composed:3:19 PM 05/22/2024

## 2024-05-29 DIAGNOSIS — I73.9 PAD (PERIPHERAL ARTERY DISEASE): ICD-10-CM

## 2024-05-29 DIAGNOSIS — M19.90 OSTEOARTHRITIS, UNSPECIFIED OSTEOARTHRITIS TYPE, UNSPECIFIED SITE: ICD-10-CM

## 2024-05-29 NOTE — TELEPHONE ENCOUNTER
No care due was identified.  [facility] Embedded Care Due Messages. Reference number: [id number].   5/29/2024 12:36:16 PM CDT

## 2024-05-30 RX ORDER — TRAMADOL HYDROCHLORIDE 50 MG/1
50 TABLET ORAL EVERY 12 HOURS PRN
Qty: 60 TABLET | Refills: 2 | Status: SHIPPED | OUTPATIENT
Start: 2024-05-30

## 2024-06-10 ENCOUNTER — PATIENT MESSAGE (OUTPATIENT)
Dept: INTERNAL MEDICINE | Facility: CLINIC | Age: 74
End: 2024-06-10
Payer: MEDICARE

## 2024-06-14 DIAGNOSIS — E78.5 HYPERLIPIDEMIA, UNSPECIFIED HYPERLIPIDEMIA TYPE: ICD-10-CM

## 2024-06-14 DIAGNOSIS — I70.235 ATHEROSCLEROSIS OF NATIVE ARTERIES OF RIGHT LEG WITH ULCERATION OF OTHER PART OF FOOT: ICD-10-CM

## 2024-06-14 RX ORDER — ROSUVASTATIN CALCIUM 5 MG/1
5 TABLET, COATED ORAL
Qty: 90 TABLET | Refills: 3 | Status: SHIPPED | OUTPATIENT
Start: 2024-06-14

## 2024-06-14 NOTE — TELEPHONE ENCOUNTER
No care due was identified.  [facility] Embedded Care Due Messages. Reference number: [id number].   6/14/2024 1:47:45 AM CDT

## 2024-06-14 NOTE — TELEPHONE ENCOUNTER
Refill Routing Note   Medication(s) are not appropriate for processing by [facility] for the following reason(s):        Allergy or intolerance    [facility] action(s):  Defer        Medication Therapy Plan: Allergy/Contraindication: rosuvastatin      Appointments  past 12m or future 3m with PCP    Date Provider   Last Visit   4/8/2024 Gordo Naik MD   Next Visit   9/5/2024 Gordo Naik MD   ED visits in past 90 days: 0        Note composed:2:53 AM 06/14/2024

## 2024-06-20 ENCOUNTER — PATIENT MESSAGE (OUTPATIENT)
Dept: INTERNAL MEDICINE | Facility: CLINIC | Age: 74
End: 2024-06-20
Payer: MEDICARE

## 2024-06-20 ENCOUNTER — TELEPHONE (OUTPATIENT)
Dept: INTERNAL MEDICINE | Facility: CLINIC | Age: 74
End: 2024-06-20
Payer: MEDICARE

## 2024-06-20 ENCOUNTER — PATIENT MESSAGE (OUTPATIENT)
Dept: INTERNAL MEDICINE | Facility: CLINIC | Age: 74
End: 2024-06-20

## 2024-06-20 NOTE — TELEPHONE ENCOUNTER
Hi, please call her  and how she is doing. How high temperature? Anyone sick she has been around? Any chest pains.  Is she able to eat and drink?  Please let me know.  Please see if they can come in  Sally Reagan today for 330 (could be video visit too)  or tomorrow to see a resident doctor -- Dr Beth Chau for these symptoms or Ms   Thank you, Gordo Naik

## 2024-06-20 NOTE — TELEPHONE ENCOUNTER
----- Message from Olga Ortiz sent at 6/20/2024  1:29 PM CDT -----  Patient is returning a phone call.    Who left a message for the patient: Maria Luz    Does patient know what this is regarding:  Yes, he scheduled a virtual appmt with Dr. Negrete

## 2024-06-20 NOTE — TELEPHONE ENCOUNTER
Called pt no answer left a massage. Called pt  and remember him pt handicap document wasn't picked up. He will pick it up soon.

## 2024-07-02 ENCOUNTER — PATIENT OUTREACH (OUTPATIENT)
Dept: ADMINISTRATIVE | Facility: HOSPITAL | Age: 74
End: 2024-07-02
Payer: MEDICARE

## 2024-07-02 NOTE — PROGRESS NOTES
Health Maintenance Due   Topic Date Due    Sign Pain Contract  Never done    Complete Opioid Risk Tool  Never done    RSV Vaccine (Age 60+ and Pregnant patients) (1 - 1-dose 60+ series) Never done    DEXA Scan  10/09/2020    Shingles Vaccine (3 of 3) 11/20/2020    COVID-19 Vaccine (11 - 2023-24 season) 09/01/2023    Mammogram  01/17/2024     Triggered LINKS and reconciled immunizations. Updated Care Everywhere. Chart review completed as part of April MA Gap List report.

## 2024-07-15 DIAGNOSIS — M19.90 OSTEOARTHRITIS, UNSPECIFIED OSTEOARTHRITIS TYPE, UNSPECIFIED SITE: ICD-10-CM

## 2024-07-15 DIAGNOSIS — I73.9 PAD (PERIPHERAL ARTERY DISEASE): ICD-10-CM

## 2024-07-15 NOTE — TELEPHONE ENCOUNTER
No care due was identified.  [facility] Embedded Care Due Messages. Reference number: [id number].   7/15/2024 12:28:24 PM CDT

## 2024-07-15 NOTE — TELEPHONE ENCOUNTER
----- Message from Kaylyn Salvador sent at 7/15/2024  4:26 PM CDT -----  Contact: [phone] ext [phone] Elvie with [facility]  Patient would like to get medical advice.  Symptoms (please be specific):   Elvie is requesting an order for a home health aid 2X a week for 4 weeks. Elvie states she can take a verbal for the order.   How long have you had these symptoms: N/A  Would you like a call back, or a response through your [facility] portal?:   call back to Elvie  ext [phone]   Pharmacy name and phone # (copy from chart): N/A    Comments:

## 2024-07-16 RX ORDER — MINERAL OIL
180 ENEMA (ML) RECTAL DAILY
Qty: 90 TABLET | Refills: 3 | Status: SHIPPED | OUTPATIENT
Start: 2024-07-16

## 2024-07-16 RX ORDER — TRAMADOL HYDROCHLORIDE 50 MG/1
50 TABLET ORAL EVERY 12 HOURS PRN
Qty: 60 TABLET | Refills: 2 | Status: SHIPPED | OUTPATIENT
Start: 2024-07-16

## 2024-07-16 NOTE — TELEPHONE ENCOUNTER
Hi, yes please provide my verbal authorization for continued -- home health aide  Thank you, Gordo Naik

## 2024-08-05 ENCOUNTER — DOCUMENT SCAN (OUTPATIENT)
Dept: HOME HEALTH SERVICES | Facility: HOSPITAL | Age: 74
End: 2024-08-05
Payer: MEDICARE

## 2024-08-26 RX ORDER — EZETIMIBE 10 MG/1
10 TABLET ORAL
Qty: 90 TABLET | Refills: 2 | Status: SHIPPED | OUTPATIENT
Start: 2024-08-26

## 2024-08-26 NOTE — TELEPHONE ENCOUNTER
No care due was identified.  [facility] Embedded Care Due Messages. Reference number: [id number].   8/26/2024 7:04:48 AM CDT

## 2024-08-26 NOTE — TELEPHONE ENCOUNTER
Refill Routing Note   Medication(s) are not appropriate for processing by [facility] for the following reason(s):        Required labs abnormal    [facility] action(s):  Defer  Approve               Appointments  past 12m or future 3m with PCP    Date Provider   Last Visit   4/8/2024 Gordo Naik MD   Next Visit   Visit date not found Gordo Naik MD   ED visits in past 90 days: 0        Note composed:5:32 PM 08/26/2024

## 2024-08-27 RX ORDER — CLOPIDOGREL BISULFATE 75 MG/1
75 TABLET ORAL
Qty: 90 TABLET | Refills: 2 | Status: SHIPPED | OUTPATIENT
Start: 2024-08-27

## 2024-09-18 DIAGNOSIS — Z78.0 MENOPAUSE: ICD-10-CM

## 2024-09-24 DIAGNOSIS — M19.90 OSTEOARTHRITIS, UNSPECIFIED OSTEOARTHRITIS TYPE, UNSPECIFIED SITE: ICD-10-CM

## 2024-09-24 DIAGNOSIS — I73.9 PAD (PERIPHERAL ARTERY DISEASE): ICD-10-CM

## 2024-09-25 RX ORDER — MINERAL OIL
180 ENEMA (ML) RECTAL DAILY
Qty: 90 TABLET | Refills: 3 | Status: SHIPPED | OUTPATIENT
Start: 2024-09-25

## 2024-09-25 RX ORDER — TRAMADOL HYDROCHLORIDE 50 MG/1
50 TABLET ORAL EVERY 12 HOURS PRN
Qty: 60 TABLET | Refills: 2 | Status: SHIPPED | OUTPATIENT
Start: 2024-09-25

## 2024-09-25 NOTE — TELEPHONE ENCOUNTER
No care due was identified.  [facility] Embedded Care Due Messages. Reference number: [id number].   9/24/2024 11:01:43 PM CDT

## 2024-10-08 RX ORDER — MINERAL OIL
180 ENEMA (ML) RECTAL DAILY
Qty: 90 TABLET | Refills: 3 | Status: SHIPPED | OUTPATIENT
Start: 2024-10-08

## 2024-10-08 NOTE — TELEPHONE ENCOUNTER
No care due was identified.  [facility] Embedded Care Due Messages. Reference number: [id number].   10/08/2024 10:38:49 AM CDT

## 2024-10-11 ENCOUNTER — TELEPHONE (OUTPATIENT)
Dept: INTERNAL MEDICINE | Facility: CLINIC | Age: 74
End: 2024-10-11
Payer: MEDICARE

## 2024-10-11 DIAGNOSIS — E66.01 MORBID OBESITY: Primary | ICD-10-CM

## 2024-10-11 DIAGNOSIS — I73.9 PAD (PERIPHERAL ARTERY DISEASE): ICD-10-CM

## 2024-10-11 NOTE — TELEPHONE ENCOUNTER
"Hi, please fax this order to [facility].  Please let the  know that we are faxing it there.  Here is their number as well, [phone], he can call the office directly and ask about the status of the order.      Thank you, Gordo Naik  "  Yes spoke to Mr. Echavarria and he is calling on behalf his wife. Needs a new hospital mattress. Can you please place order."     "

## 2024-11-04 ENCOUNTER — PATIENT MESSAGE (OUTPATIENT)
Dept: INTERNAL MEDICINE | Facility: CLINIC | Age: 74
End: 2024-11-04
Payer: MEDICARE

## 2024-11-05 ENCOUNTER — TELEPHONE (OUTPATIENT)
Dept: INTERNAL MEDICINE | Facility: CLINIC | Age: 74
End: 2024-11-05
Payer: MEDICARE

## 2024-11-05 NOTE — TELEPHONE ENCOUNTER
Spoke to pt . Stated he has call [facility] and they said no referral has been sent. I offer an virtual appointment for this Friday to resolve the hospital mattress for pt. Its been scheduled.

## 2024-11-05 NOTE — TELEPHONE ENCOUNTER
----- Message from Med Assistant García sent at 10/25/2024 11:45 AM CDT -----  Contact: [phone]    ----- Message -----  From: Rosamaria Colon  Sent: 10/25/2024  11:36 AM CDT  To: [facility]um[facility] Caro Staff    Type: Orders Request    What orders/ testing are being requested? Ms. Forte with [facility] is calling to request an order for a mattress also need Pt Demographic, Insurance inf. And clinical.    Is there a future appointment scheduled for the patient with PCP? No     When?    Would you prefer a response via [employer]? Call back     Comments:  Fax [phone]  Call back  [phone]

## 2024-11-05 NOTE — TELEPHONE ENCOUNTER
Hi, please contact the patient and let her know we received a request for a mattress order from a company.  Please confirm that she wants us to send in the request for a mattress.    Also I would need to see her back, since it has been over 6 months, to be able to order a mattress for her, please offer her an appointment, it can be a video visit if needed.    Thank you, Gordo Naik

## 2025-02-18 DIAGNOSIS — I10 ESSENTIAL HYPERTENSION: ICD-10-CM

## 2025-02-18 NOTE — TELEPHONE ENCOUNTER
No care due was identified.  [facility] Embedded Care Due Messages. Reference number: [id number].   2/18/2025 5:40:50 PM CST

## 2025-02-19 RX ORDER — AMLODIPINE BESYLATE 10 MG/1
10 TABLET ORAL DAILY
Qty: 90 TABLET | Refills: 0 | Status: SHIPPED | OUTPATIENT
Start: 2025-02-19

## 2025-02-19 NOTE — TELEPHONE ENCOUNTER
Refill Decision Note   Ana Patel  is requesting a refill authorization.  Brief Assessment and Rationale for Refill:  Approve     Medication Therapy Plan:         Comments:     Note composed:7:45 AM 02/19/2025

## 2025-05-28 DIAGNOSIS — I10 ESSENTIAL HYPERTENSION: ICD-10-CM

## 2025-05-28 NOTE — TELEPHONE ENCOUNTER
Refill Routing Note   Medication(s) are not appropriate for processing by [facility] for the following reason(s):        Required vitals outdated    [facility] action(s):  Defer             Appointments  past 12m or future 3m with PCP    Date Provider   Last Visit   4/8/2024 Gordo Naik MD   Next Visit   Visit date not found Gordo Naik MD   ED visits in past 90 days: 0        Note composed:4:10 PM 05/28/2025

## 2025-05-28 NOTE — TELEPHONE ENCOUNTER
No care due was identified.  [facility] Embedded Care Due Messages. Reference number: [id number].   5/28/2025 3:06:55 PM CDT

## 2025-05-29 RX ORDER — AMLODIPINE BESYLATE 10 MG/1
10 TABLET ORAL
Qty: 90 TABLET | Refills: 0 | Status: SHIPPED | OUTPATIENT
Start: 2025-05-29

## 2025-06-02 ENCOUNTER — TELEPHONE (OUTPATIENT)
Dept: PHARMACY | Facility: CLINIC | Age: 75
End: 2025-06-02
Payer: MEDICARE

## 2025-06-15 NOTE — TELEPHONE ENCOUNTER
No care due was identified.  [facility] Embedded Care Due Messages. Reference number: [id number].   6/15/2025 12:56:18 PM CDT

## 2025-06-16 RX ORDER — CLOPIDOGREL BISULFATE 75 MG/1
75 TABLET ORAL
Qty: 90 TABLET | Refills: 0 | Status: SHIPPED | OUTPATIENT
Start: 2025-06-16

## 2025-06-16 RX ORDER — EZETIMIBE 10 MG/1
10 TABLET ORAL
Qty: 90 TABLET | Refills: 0 | Status: SHIPPED | OUTPATIENT
Start: 2025-06-16

## 2025-06-16 NOTE — TELEPHONE ENCOUNTER
Refill Routing Note   Medication(s) are not appropriate for processing by [facility] for the following reason(s):        Required labs outdated  Required labs abnormal    [facility] action(s):  Defer               Appointments  past 12m or future 3m with PCP    Date Provider   Last Visit   4/8/2024 Gordo Naik MD   Next Visit   Visit date not found Gordo Naik MD   ED visits in past 90 days: 0        Note composed:12:28 AM 06/16/2025

## 2025-08-29 RX ORDER — EZETIMIBE 10 MG/1
10 TABLET ORAL
Qty: 90 TABLET | Refills: 0 | Status: SHIPPED | OUTPATIENT
Start: 2025-08-29

## 2025-08-29 RX ORDER — CLOPIDOGREL BISULFATE 75 MG/1
75 TABLET ORAL
Qty: 90 TABLET | Refills: 0 | Status: SHIPPED | OUTPATIENT
Start: 2025-08-29

## (undated) DEVICE — INFLATION DEVICE ENCORE 26

## (undated) DEVICE — CATH BLLN SEEKER .035IN 135CM

## (undated) DEVICE — GUIDEWIRE SUPRA CORE 035 300CM

## (undated) DEVICE — SET DECANTER MEDICHOICE

## (undated) DEVICE — CATH ULTRAVERSE 035 5X100X130

## (undated) DEVICE — KIT INTRO MICRO NIT VSI 4FR

## (undated) DEVICE — CATH 5FR OMNIFLUSH 65CM .038

## (undated) DEVICE — SYS LABEL CORRECT MED

## (undated) DEVICE — WIRE ROSEN .035X260

## (undated) DEVICE — SOL 9P NACL IRR PIC IL

## (undated) DEVICE — SOL NS 1000CC

## (undated) DEVICE — CATH ULTRAVERSE 035 5X250X130

## (undated) DEVICE — CATH BERNSTAN 5FR

## (undated) DEVICE — INTRODUCER VASC RADPQ 5FRX10CM

## (undated) DEVICE — GUIDEWIRE STF .035X180CM ANG

## (undated) DEVICE — VISE RADIFOCUS MULTI TORQUE

## (undated) DEVICE — COVER INSTR ELASTIC BAND 40X20

## (undated) DEVICE — SHEATH ANSEL FLEXOR 5FRX45CM

## (undated) DEVICE — DRESSING TRANS 4X4 TEGADERM

## (undated) DEVICE — COVERS PROBE NR-48 STERILE

## (undated) DEVICE — Device

## (undated) DEVICE — CATH ANGLED GLIDE CATH 4FR

## (undated) DEVICE — GUIDEWIRE STF .035X260CM ANG

## (undated) DEVICE — DEVICE CLOSURE MYNX GRIP 5FR

## (undated) DEVICE — SPONGE DERMACEA 4X4IN 12PLY